# Patient Record
Sex: FEMALE | Race: WHITE | NOT HISPANIC OR LATINO | Employment: FULL TIME | URBAN - NONMETROPOLITAN AREA
[De-identification: names, ages, dates, MRNs, and addresses within clinical notes are randomized per-mention and may not be internally consistent; named-entity substitution may affect disease eponyms.]

---

## 2017-02-06 ENCOUNTER — OFFICE VISIT (OUTPATIENT)
Dept: OBSTETRICS AND GYNECOLOGY | Facility: CLINIC | Age: 37
End: 2017-02-06

## 2017-02-06 VITALS
HEIGHT: 62 IN | DIASTOLIC BLOOD PRESSURE: 92 MMHG | SYSTOLIC BLOOD PRESSURE: 114 MMHG | WEIGHT: 250 LBS | BODY MASS INDEX: 46.01 KG/M2

## 2017-02-06 DIAGNOSIS — Z01.411 ENCOUNTER FOR GYNECOLOGICAL EXAMINATION WITH ABNORMAL FINDING: ICD-10-CM

## 2017-02-06 DIAGNOSIS — N91.2 AMENORRHEA: Primary | ICD-10-CM

## 2017-02-06 DIAGNOSIS — N86 CERVICAL EROSION: ICD-10-CM

## 2017-02-06 LAB
B-HCG UR QL: NEGATIVE
INTERNAL NEGATIVE CONTROL: NORMAL
INTERNAL POSITIVE CONTROL: NORMAL
Lab: NORMAL

## 2017-02-06 PROCEDURE — 81025 URINE PREGNANCY TEST: CPT | Performed by: OBSTETRICS & GYNECOLOGY

## 2017-02-06 PROCEDURE — 99395 PREV VISIT EST AGE 18-39: CPT | Performed by: OBSTETRICS & GYNECOLOGY

## 2017-02-06 PROCEDURE — G0123 SCREEN CERV/VAG THIN LAYER: HCPCS | Performed by: OBSTETRICS & GYNECOLOGY

## 2017-02-06 PROCEDURE — 88305 TISSUE EXAM BY PATHOLOGIST: CPT | Performed by: OBSTETRICS & GYNECOLOGY

## 2017-02-06 RX ORDER — ERGOCALCIFEROL 1.25 MG/1
50000 CAPSULE ORAL
COMMUNITY
Start: 2017-01-09

## 2017-02-06 RX ORDER — TRAMADOL HYDROCHLORIDE 50 MG/1
50 TABLET ORAL EVERY 6 HOURS PRN
COMMUNITY
Start: 2017-01-14 | End: 2018-11-29 | Stop reason: HOSPADM

## 2017-02-06 RX ORDER — PROPRANOLOL HYDROCHLORIDE 20 MG/1
20 TABLET ORAL 3 TIMES DAILY PRN
COMMUNITY
Start: 2017-01-11

## 2017-02-06 RX ORDER — FAMOTIDINE 20 MG/1
20 TABLET, FILM COATED ORAL AS NEEDED
COMMUNITY
Start: 2017-01-09 | End: 2018-08-23

## 2017-02-06 RX ORDER — MELOXICAM 15 MG/1
15 TABLET ORAL DAILY
COMMUNITY
Start: 2017-01-09 | End: 2018-10-29

## 2017-02-06 RX ORDER — IMIPRAMINE HCL 50 MG
50 TABLET ORAL NIGHTLY
COMMUNITY
Start: 2017-01-09 | End: 2018-09-28

## 2017-02-06 RX ORDER — DULOXETIN HYDROCHLORIDE 60 MG/1
60 CAPSULE, DELAYED RELEASE ORAL 2 TIMES DAILY
COMMUNITY
Start: 2017-01-09

## 2017-02-06 NOTE — PROGRESS NOTES
Subjective   Cathi Krishnan is a 36 y.o. female who presents for a yearly gyn visit.  Pt has been amenorrheic for 6 months.     History of Present Illness  Pt is a 37 yo WF who is here for a yearly gyn visit who has been amenorrheic for 6 months.  Pt states she still has some cramping when it is period time .  The following portions of the patient's history were reviewed and updated as appropriate: allergies, current medications, past family history, past medical history, past social history, past surgical history and problem list.    Review of Systems   Genitourinary: Positive for menstrual problem.   All other systems reviewed and are negative.      Objective   Physical Exam   Constitutional: She is oriented to person, place, and time.   Morbid obesity   HENT:   Head: Normocephalic.   Eyes: Conjunctivae are normal. Pupils are equal, round, and reactive to light. Right eye exhibits no discharge. Left eye exhibits no discharge.   Neck: Normal range of motion. Neck supple. No tracheal deviation present. No thyromegaly present.   Cardiovascular: Normal rate, regular rhythm and normal heart sounds.    Pulmonary/Chest: Effort normal and breath sounds normal. She has no wheezes. She has no rales. Right breast exhibits no inverted nipple, no mass, no nipple discharge, no skin change and no tenderness. Left breast exhibits no inverted nipple, no mass, no nipple discharge, no skin change and no tenderness. Breasts are symmetrical. There is no breast swelling.   Abdominal: Soft. Bowel sounds are normal. She exhibits no distension and no mass. There is no tenderness. There is no rebound and no guarding. No hernia. Hernia confirmed negative in the right inguinal area and confirmed negative in the left inguinal area.   Genitourinary: Rectum normal, vagina normal and uterus normal. Rectal exam shows no external hemorrhoid, no internal hemorrhoid, no fissure, no mass, no tenderness and anal tone normal. No breast tenderness,  discharge or bleeding. Pelvic exam was performed with patient supine. No labial fusion. There is no rash, tenderness, lesion or injury on the right labia. There is no rash, tenderness, lesion or injury on the left labia. Cervix exhibits no motion tenderness, no discharge and no friability. Right adnexum displays no mass, no tenderness and no fullness. Left adnexum displays no mass, no tenderness and no fullness. No erythema, tenderness or bleeding in the vagina. No foreign body in the vagina. No signs of injury around the vagina. No vaginal discharge found.   Musculoskeletal: Normal range of motion.   Lymphadenopathy:        Right: No inguinal adenopathy present.        Left: No inguinal adenopathy present.   Neurological: She is alert and oriented to person, place, and time. She has normal reflexes.   Skin: Skin is warm and dry. No rash noted.   Psychiatric: She has a normal mood and affect. Her behavior is normal. Judgment and thought content normal.   Nursing note and vitals reviewed.        Assessment/Plan     WWE  Secondary amenorrhea  Morbid obesity  Cervical erosion    Check FSH, T estrogens, TSH, T4, Prolactin.  Cervical bx taken at 2 0'clock

## 2017-02-07 LAB
ESTROGEN SERPL-MCNC: 151 PG/ML
FSH SERPL-ACNC: 5.5 MIU/ML
PROLACTIN SERPL-MCNC: 11.3 NG/ML (ref 4.8–23.3)
T4 FREE SERPL-MCNC: 0.88 NG/DL (ref 0.78–2.19)
TSH SERPL DL<=0.005 MIU/L-ACNC: 1.79 MIU/ML (ref 0.47–4.68)

## 2017-02-08 LAB
CYTO UR: NORMAL
LAB AP CASE REPORT: NORMAL
LAB AP CLINICAL INFORMATION: NORMAL
Lab: NORMAL
PATH REPORT.FINAL DX SPEC: NORMAL
PATH REPORT.GROSS SPEC: NORMAL

## 2017-02-09 LAB
GEN CATEG CVX/VAG CYTO-IMP: NORMAL
LAB AP CASE REPORT: NORMAL
LAB AP GYN ADDITIONAL INFORMATION: NORMAL
LAB AP GYN OTHER FINDINGS: NORMAL
Lab: NORMAL
PATH INTERP SPEC-IMP: NORMAL
STAT OF ADQ CVX/VAG CYTO-IMP: NORMAL

## 2017-02-17 ENCOUNTER — OFFICE VISIT (OUTPATIENT)
Dept: OBSTETRICS AND GYNECOLOGY | Facility: CLINIC | Age: 37
End: 2017-02-17

## 2017-02-17 VITALS
HEIGHT: 62 IN | WEIGHT: 250 LBS | BODY MASS INDEX: 46.01 KG/M2 | DIASTOLIC BLOOD PRESSURE: 84 MMHG | SYSTOLIC BLOOD PRESSURE: 124 MMHG

## 2017-02-17 DIAGNOSIS — N91.1 AMENORRHEA, SECONDARY: Primary | ICD-10-CM

## 2017-02-17 PROCEDURE — 99212 OFFICE O/P EST SF 10 MIN: CPT | Performed by: OBSTETRICS & GYNECOLOGY

## 2017-02-17 RX ORDER — MEDROXYPROGESTERONE ACETATE 10 MG/1
10 TABLET ORAL DAILY
Qty: 5 TABLET | Refills: 0 | Status: SHIPPED | OUTPATIENT
Start: 2017-02-17 | End: 2017-03-27 | Stop reason: SDUPTHER

## 2017-02-17 NOTE — PROGRESS NOTES
Subjective   Cathi Krishnan is a 36 y.o. female who is experiencing secondary amenorrhea.     History of Present Illness  Pt is a 37 yo WF who has secondary amenorrhea and she had an FSH and this was definitely premenopausal at 5.5, TSH, T4 were nl, PT was neg, T. Estrogens mildly decreased at 161.  The following portions of the patient's history were reviewed and updated as appropriate: allergies, current medications, past family history, past medical history, past social history, past surgical history and problem list.    Review of Systems   Constitutional: Negative for fatigue and unexpected weight change.   HENT: Negative.    Eyes: Negative.    Respiratory: Negative for cough, chest tightness and shortness of breath.    Cardiovascular: Negative for chest pain, palpitations and leg swelling.   Gastrointestinal: Negative for abdominal distention, abdominal pain, anal bleeding, blood in stool, constipation, diarrhea, nausea and vomiting.   Endocrine: Negative for polydipsia and polyuria.   Genitourinary: Positive for menstrual problem. Negative for difficulty urinating, dyspareunia, dysuria, frequency, genital sores, hematuria, pelvic pain, urgency, vaginal bleeding, vaginal discharge and vaginal pain.   Musculoskeletal: Negative.    Skin: Negative for color change and rash.   Allergic/Immunologic: Negative.    Neurological: Negative.  Negative for dizziness, seizures, syncope, light-headedness and headaches.   Hematological: Negative for adenopathy. Does not bruise/bleed easily.   Psychiatric/Behavioral: Negative for agitation, confusion, dysphoric mood, sleep disturbance and suicidal ideas. The patient is not nervous/anxious.        Objective   Physical Exam   Constitutional: She is oriented to person, place, and time. She appears well-developed and well-nourished.   HENT:   Head: Normocephalic.   Eyes: Conjunctivae are normal. Pupils are equal, round, and reactive to light. Right eye exhibits no discharge.  Left eye exhibits no discharge.   Neck: Normal range of motion. Neck supple. No tracheal deviation present. No thyromegaly present.   Cardiovascular: Normal rate, regular rhythm and normal heart sounds.    Pulmonary/Chest: Effort normal and breath sounds normal. She has no wheezes. She has no rales. Right breast exhibits no inverted nipple, no mass, no nipple discharge, no skin change and no tenderness. Left breast exhibits no inverted nipple, no mass, no nipple discharge, no skin change and no tenderness. Breasts are symmetrical. There is no breast swelling.   Abdominal: Soft. Bowel sounds are normal. She exhibits no distension and no mass. There is no tenderness. There is no rebound and no guarding. No hernia. Hernia confirmed negative in the right inguinal area and confirmed negative in the left inguinal area.   Genitourinary: Rectum normal. Rectal exam shows no external hemorrhoid, no internal hemorrhoid, no fissure, no mass, no tenderness and anal tone normal. No breast tenderness, discharge or bleeding. Pelvic exam was performed with patient supine. No labial fusion. There is no rash, tenderness, lesion or injury on the right labia. There is no rash, tenderness, lesion or injury on the left labia. Cervix exhibits no motion tenderness, no discharge and no friability. Right adnexum displays no mass, no tenderness and no fullness. Left adnexum displays no mass, no tenderness and no fullness. No erythema, tenderness or bleeding in the vagina. No foreign body in the vagina. No signs of injury around the vagina. No vaginal discharge found.   Genitourinary Comments: BUS glands appear nl, perineum intact, urethral orifice appears nl, vagina has good support.   Musculoskeletal: Normal range of motion.   Lymphadenopathy:        Right: No inguinal adenopathy present.        Left: No inguinal adenopathy present.   Neurological: She is alert and oriented to person, place, and time. She has normal reflexes.   Skin: Skin is  warm and dry. No rash noted.   Psychiatric: She has a normal mood and affect. Her behavior is normal. Judgment and thought content normal.   Nursing note and vitals reviewed.      Assessment/Plan   Cathi was seen today for amenorrhea.    Diagnoses and all orders for this visit:    Amenorrhea, secondary     With all of the lab tests being nl, it appears that pt is oligoovulatory  Rx Provera 10 mg daily for 5 days  Keep a bleeding calendar and RV 1 month

## 2017-03-27 ENCOUNTER — OFFICE VISIT (OUTPATIENT)
Dept: OBSTETRICS AND GYNECOLOGY | Facility: CLINIC | Age: 37
End: 2017-03-27

## 2017-03-27 VITALS
DIASTOLIC BLOOD PRESSURE: 82 MMHG | SYSTOLIC BLOOD PRESSURE: 130 MMHG | WEIGHT: 251 LBS | HEIGHT: 62 IN | BODY MASS INDEX: 46.19 KG/M2

## 2017-03-27 DIAGNOSIS — N91.1 AMENORRHEA, SECONDARY: Primary | ICD-10-CM

## 2017-03-27 PROCEDURE — 99212 OFFICE O/P EST SF 10 MIN: CPT | Performed by: OBSTETRICS & GYNECOLOGY

## 2017-03-27 RX ORDER — MEDROXYPROGESTERONE ACETATE 10 MG/1
10 TABLET ORAL DAILY
Qty: 30 TABLET | Refills: 3 | Status: SHIPPED | OUTPATIENT
Start: 2017-03-27 | End: 2017-06-19 | Stop reason: SDUPTHER

## 2017-06-19 ENCOUNTER — OFFICE VISIT (OUTPATIENT)
Dept: OBSTETRICS AND GYNECOLOGY | Facility: CLINIC | Age: 37
End: 2017-06-19

## 2017-06-19 VITALS
HEIGHT: 62 IN | DIASTOLIC BLOOD PRESSURE: 84 MMHG | SYSTOLIC BLOOD PRESSURE: 118 MMHG | BODY MASS INDEX: 46.56 KG/M2 | WEIGHT: 253 LBS

## 2017-06-19 DIAGNOSIS — N93.8 DUB (DYSFUNCTIONAL UTERINE BLEEDING): Primary | ICD-10-CM

## 2017-06-19 PROCEDURE — 99212 OFFICE O/P EST SF 10 MIN: CPT | Performed by: OBSTETRICS & GYNECOLOGY

## 2017-06-19 RX ORDER — MEDROXYPROGESTERONE ACETATE 10 MG/1
10 TABLET ORAL DAILY
Qty: 30 TABLET | Refills: 3 | Status: SHIPPED | OUTPATIENT
Start: 2017-06-19 | End: 2018-06-21

## 2017-06-19 NOTE — PROGRESS NOTES
Subjective   Cathi Krishnan is a 36 y.o. female who is here for follow up on her DUB.    History of Present Illness  Patient is a 37 yo WF with DUB from chronic anovulation.  I started her on Provera 10 mg the first 10 days of each month and she is having 3-4 days of light withdrawal bleeding q month.  The following portions of the patient's history were reviewed and updated as appropriate: allergies, current medications, past family history, past medical history, past social history, past surgical history and problem list.    Review of Systems   Constitutional: Negative for fatigue and unexpected weight change.   HENT: Negative.    Eyes: Negative.    Respiratory: Negative for cough, chest tightness and shortness of breath.    Cardiovascular: Negative for chest pain, palpitations and leg swelling.   Gastrointestinal: Negative for abdominal distention, abdominal pain, anal bleeding, blood in stool, constipation, diarrhea, nausea and vomiting.   Endocrine: Negative for polydipsia and polyuria.   Genitourinary: Positive for menstrual problem. Negative for difficulty urinating, dyspareunia, dysuria, frequency, genital sores, hematuria, pelvic pain, urgency, vaginal bleeding, vaginal discharge and vaginal pain.   Musculoskeletal: Negative.    Skin: Negative for color change and rash.   Allergic/Immunologic: Negative.    Neurological: Negative.  Negative for dizziness, seizures, syncope, light-headedness and headaches.   Hematological: Negative for adenopathy. Does not bruise/bleed easily.   Psychiatric/Behavioral: Negative for agitation, confusion, dysphoric mood, sleep disturbance and suicidal ideas. The patient is not nervous/anxious.        Objective   Physical Exam   Constitutional: She is oriented to person, place, and time. She appears well-developed and well-nourished.   HENT:   Head: Normocephalic.   Eyes: Pupils are equal, round, and reactive to light.   Neck: Normal range of motion.   Cardiovascular: Normal  rate.    Pulmonary/Chest: Effort normal.   Abdominal: Soft.   Musculoskeletal: Normal range of motion.   Neurological: She is alert and oriented to person, place, and time.   Skin: Skin is warm and dry.   Psychiatric: She has a normal mood and affect. Her behavior is normal. Judgment and thought content normal.   Nursing note and vitals reviewed.      Assessment/Plan   Cathi was seen today for follow-up.    Diagnoses and all orders for this visit:    DUB (dysfunctional uterine bleeding)    Patient is having withdrawal bleeding every month with Provera 10 mg daily for the first 10 days of the month.    Continue this regimen.  RV 1 year or prn if problems occur.

## 2018-04-23 ENCOUNTER — TELEPHONE (OUTPATIENT)
Dept: BARIATRICS/WEIGHT MGMT | Facility: CLINIC | Age: 38
End: 2018-04-23

## 2018-04-23 ENCOUNTER — OFFICE VISIT (OUTPATIENT)
Dept: BARIATRICS/WEIGHT MGMT | Facility: HOSPITAL | Age: 38
End: 2018-04-23

## 2018-04-23 ENCOUNTER — OFFICE VISIT (OUTPATIENT)
Dept: BARIATRICS/WEIGHT MGMT | Facility: CLINIC | Age: 38
End: 2018-04-23

## 2018-04-23 VITALS
DIASTOLIC BLOOD PRESSURE: 93 MMHG | BODY MASS INDEX: 47.92 KG/M2 | HEART RATE: 100 BPM | HEIGHT: 62 IN | WEIGHT: 260.4 LBS | TEMPERATURE: 98 F | SYSTOLIC BLOOD PRESSURE: 149 MMHG | OXYGEN SATURATION: 99 %

## 2018-04-23 DIAGNOSIS — R03.0 ELEVATED BLOOD PRESSURE READING WITHOUT DIAGNOSIS OF HYPERTENSION: ICD-10-CM

## 2018-04-23 DIAGNOSIS — R29.818 SUSPECTED SLEEP APNEA: ICD-10-CM

## 2018-04-23 DIAGNOSIS — G47.19 EXCESSIVE DAYTIME SLEEPINESS: ICD-10-CM

## 2018-04-23 DIAGNOSIS — R53.83 FATIGUE, UNSPECIFIED TYPE: ICD-10-CM

## 2018-04-23 DIAGNOSIS — E66.01 OBESITY, CLASS III, BMI 40-49.9 (MORBID OBESITY) (HCC): Primary | ICD-10-CM

## 2018-04-23 DIAGNOSIS — R63.5 WEIGHT GAIN: ICD-10-CM

## 2018-04-23 PROCEDURE — 99204 OFFICE O/P NEW MOD 45 MIN: CPT | Performed by: NURSE PRACTITIONER

## 2018-04-23 PROCEDURE — 97802 MEDICAL NUTRITION INDIV IN: CPT

## 2018-04-23 RX ORDER — DOCUSATE SODIUM 100 MG/1
90 CAPSULE, LIQUID FILLED ORAL DAILY
COMMUNITY

## 2018-04-23 RX ORDER — PSEUDOEPHEDRINE HCL 30 MG
30 TABLET ORAL DAILY PRN
COMMUNITY

## 2018-04-23 RX ORDER — NAPROXEN 500 MG/1
500 TABLET ORAL 2 TIMES DAILY WITH MEALS
COMMUNITY
End: 2018-10-29

## 2018-04-23 RX ORDER — FERROUS SULFATE 325(65) MG
325 TABLET ORAL
COMMUNITY

## 2018-04-23 NOTE — PROGRESS NOTES
"Nutrition Bariatric/[facility] Note     Visit   Initial Assessment     Anthropometrics   Height: 62\"  Weight: 260.5#  BMI: 47.6    Waist: 53\"  Hip: 57\"  Chest: 52\"  Thigh: 27\"  Arm: 17\"  Body Fat: 48.6%    Nutrition Recall  24 Hour recall:  (10am) sweetened cereal or cookies, sweet tea (L) skips meal (3-4pm) meat, vegetable, potatoes, sweet tea  Eating 2 meals daily   Protein lacking at breakfast  Snacking: trail mix, peanut butter crackers, cookies  Excessive sweet intake. Sips on sweet tea all day.  Large portions  Drinking with meals   Drinking carbonated beverages only when eating out.  Drinking less than 64 fluid ounces    Exercise   None    Habits:  None    Education    Goal Setting and Information Packet    Nutrition Goals   Eat 3-4 meals per day with protein  Eat protein first at meals  Eliminate snacks  Healthier food choices  Portion control / Use smaller plate or measuring cup   Eliminate soda  Replace sugar beverages with artifical sweetened   Increase fluid intake to 64 ounces per day    Exercise Goals  Add 15-30 minutes of walking, cycling, elliptical, swimming, chair, yoga or crossfit daily    Lisa Mc RD, LD  04/23/2018  1:04 PM    "

## 2018-04-23 NOTE — TELEPHONE ENCOUNTER
JESEM to notify patient that her in house sleep study is scheduled for 05/01/2018 at 9:00pm at [facility]

## 2018-04-23 NOTE — PATIENT INSTRUCTIONS
Cathi Krishnan is a  37 y.o. who has morbid obesity with BMI of 47.6 and desires surgical weight loss. Patient has the following comorbidities: suspected sleep apnea. A sleep study has been ordered today (externally per patient request). Office will arrange.  Patient has been advised that this surgery is considered to be elective major surgery that is typically done laparoscopically. Patient is a potentially good surgical candidate. Patient will need to meet with the Bariatric Surgeon to further discuss surgical options. Patient has been advised that they will need to have a work-up prior to surgery. This work-up will include an EGD to assess for H. Pylori and Espinoza's esophagus. Additionally, patient will need to have a psychological evaluation and clearance prior to surgery. Patient will need the following additional clearances/testing: EKG. Baseline lab work will be obtained today. Patient will see the dietician today for make specific goals for diet, exercise, and lifestyle. Patient has received intensive behavioral therapy for obesity today. I will have them follow up in one month for a weight recheck and to further discuss options.

## 2018-04-23 NOTE — PROGRESS NOTES
"Subjective   Cathi Krishnan is a 37 y.o. female.     History of Present Illness   Cathi Krishnan is a 37 y.o. year-old who has morbid obesity and is interested in having surgical weight loss. Patient has been overweight for the past 14 years. The most weight ever lost was 10 pounds from watching what she ate. Unsupervised diet attempts include: calorie counting and fasting. Supervised diet attempts include: medically supervised weight loss. Patient has tried the following OTC or prescription medications for weight loss: [employer] and garcinia cambodia. Patient states she tend to eat due to physical hunger and food makes her feel happier.  Patient is limited in activities due to: chronic knee and back pain.   Vitals:    04/23/18 1251 04/23/18 1322   BP: (!) 149/3 149/93   BP Location: Right arm    Patient Position: Sitting    Cuff Size: Adult    Pulse: 100    Temp: 98 °F (36.7 °C)    SpO2: 99%    Weight: 118 kg (260 lb 6.4 oz)    Height: 157.5 cm (62\")      She  has a past medical history of Ankle swelling; Anxiety; Arthritis; Back pain; Constipation; Depression; Excessive thirst; GERD (gastroesophageal reflux disease); Heartburn; History of attempted suicide; Leg cramps; Loss of bladder control (leaking urine); Pain; Rash; Rheumatoid arthritis; Varicose veins of both lower extremities; and Wears glasses.  She  does not have a problem list on file.  She  has a past surgical history that includes Essure tubal ligation; Tonsillectomy; Hernia repair (Left); Teeth Extraction; Esophagogastroduodenoscopy; and Cholecystectomy (1999).  Her family history includes Breast cancer in her mother; Coronary artery disease in her mother; Deep vein thrombosis in her mother; Diabetes in her brother and mother; Heart attack in her brother and maternal grandmother; Heart disease in her brother and mother; Hypertension in her brother, father, and mother; Lung cancer in her paternal grandfather; Obesity in her brother, brother, " maternal grandmother, and mother; Ovarian cancer in her mother; Sleep apnea in her mother; Stroke in her mother.  She  reports that she quit smoking about 8 years ago. Her smoking use included Cigarettes. She has a 10.00 pack-year smoking history. She has never used smokeless tobacco. She reports that she does not drink alcohol or use drugs.  Current Outpatient Prescriptions   Medication Sig Dispense Refill   • diclofenac (VOLTAREN) 1 % gel gel      • DOCUSATE SODIUM PO Take 100 mg by mouth Daily.     • DULoxetine (CYMBALTA) 60 MG capsule      • famotidine (PEPCID) 20 MG tablet      • ferrous sulfate 325 (65 FE) MG tablet Take 325 mg by mouth Daily With Breakfast.     • imipramine (TOFRANIL) 50 MG tablet      • meloxicam (MOBIC) 15 MG tablet      • naproxen (NAPROSYN) 500 MG tablet Take 500 mg by mouth 2 (Two) Times a Day With Meals.     • propranolol (INDERAL) 20 MG tablet      • pseudoephedrine (SUDAFED) 30 MG tablet Take 30 mg by mouth As Needed for Congestion. Sudogest     • traMADol (ULTRAM) 50 MG tablet      • vitamin D (ERGOCALCIFEROL) 42768 UNITS capsule capsule      • medroxyPROGESTERone (PROVERA) 10 MG tablet Take 1 tablet by mouth Daily. 1 tablet daily for the first 10 days of each month 30 tablet 3     No current facility-administered medications for this visit.      She is allergic to asa [aspirin]; cortisone; penicillins; prednisone; zantac [ranitidine hcl]; and ibuprofen.      The following portions of the patient's history were reviewed and updated as appropriate: allergies, current medications, past family history, past medical history, past social history, past surgical history and problem list.    Review of Systems   Constitutional: Positive for fatigue and unexpected weight change. Negative for activity change and appetite change.   HENT: Positive for dental problem.         Dentures   Eyes: Negative.         Wears glasses   Respiratory: Negative.  Negative for apnea, cough, chest tightness and  shortness of breath.    Cardiovascular: Positive for leg swelling. Negative for chest pain and palpitations.   Gastrointestinal: Positive for constipation. Negative for abdominal pain, anal bleeding, nausea and vomiting.        GERD, controlled with pepcid   Endocrine: Positive for polydipsia.   Genitourinary: Negative.         Stress incontinence; up to date on pap smear   Musculoskeletal: Positive for arthralgias, back pain and neck pain.        RA, mild per patient    Skin: Positive for rash.        Under skin folds   Allergic/Immunologic: Negative.    Neurological: Negative.  Negative for seizures and syncope.   Hematological: Negative.  Does not bruise/bleed easily.   Psychiatric/Behavioral: Positive for dysphoric mood. Negative for self-injury, sleep disturbance and suicidal ideas. The patient is nervous/anxious.        Objective   Physical Exam   Constitutional: She is oriented to person, place, and time. Vital signs are normal. She appears well-developed and well-nourished. She is cooperative. No distress.   HENT:   Head: Normocephalic and atraumatic.   Nose: Nose normal.   Mouth/Throat: Oropharynx is clear and moist. No oropharyngeal exudate or tonsillar abscesses.   Eyes: Conjunctivae, EOM and lids are normal. Pupils are equal, round, and reactive to light. Right eye exhibits no discharge. Left eye exhibits no discharge.   Glasses noted   Neck: Trachea normal. Neck supple. No JVD present. Carotid bruit is not present. No no neck rigidity. No tracheal deviation present. No thyromegaly present.   Cardiovascular: Normal rate, regular rhythm, S1 normal, S2 normal and normal heart sounds.    Pulmonary/Chest: Effort normal and breath sounds normal. No stridor. No respiratory distress. She has no wheezes. She has no rales.   Abdominal: Soft. Bowel sounds are normal. She exhibits no distension. There is no tenderness.   obese   Musculoskeletal: She exhibits no edema.        Right shoulder: She exhibits normal  strength.   Lymphadenopathy:     She has no cervical adenopathy.   Neurological: She is alert and oriented to person, place, and time. She has normal strength. No cranial nerve deficit.   Skin: Skin is warm, dry and intact. No rash noted.   Psychiatric: She has a normal mood and affect. Her speech is normal and behavior is normal.   Alert and oriented x 3   Vitals reviewed.      Assessment/Plan   Cathi was seen today for consult, obesity, nutrition counseling and weight loss.    Diagnoses and all orders for this visit:    Obesity, Class III, BMI 40-49.9 (morbid obesity)  -     Bariatric Nutritional Counseling; Standing  -     TSH; Future  -     Polysomnography 4 or More Parameters    Suspected sleep apnea  -     Polysomnography 4 or More Parameters    Excessive daytime sleepiness  -     Polysomnography 4 or More Parameters    Weight gain  -     TSH; Future    Fatigue, unspecified type  -     TSH; Future  -     Polysomnography 4 or More Parameters    Elevated blood pressure reading without diagnosis of hypertension          Cathi Krishnan is a  37 y.o. who has morbid obesity with BMI of 47.6 and desires surgical weight loss. Patient has the following comorbidities: suspected sleep apnea. A sleep study has been ordered today (externally per patient request). Office will arrange.  Patient has been advised that this surgery is considered to be elective major surgery that is typically done laparoscopically. Patient is a potentially good surgical candidate. Patient will need to meet with the Bariatric Surgeon to further discuss surgical options. Patient has been advised that they will need to have a work-up prior to surgery. This work-up will include an EGD to assess for H. Pylori and Espinoza's esophagus. Additionally, patient will need to have a psychological evaluation and clearance prior to surgery. Patient will need the following additional clearances/testing: EKG. Baseline lab work will be obtained today.  Patient will see the dietician today for make specific goals for diet, exercise, and lifestyle. Patient has received intensive behavioral therapy for obesity today. I will have them follow up in one month for a weight recheck and to further discuss options.

## 2018-05-21 ENCOUNTER — OFFICE VISIT (OUTPATIENT)
Dept: BARIATRICS/WEIGHT MGMT | Facility: CLINIC | Age: 38
End: 2018-05-21

## 2018-05-21 ENCOUNTER — TELEPHONE (OUTPATIENT)
Dept: BARIATRICS/WEIGHT MGMT | Facility: CLINIC | Age: 38
End: 2018-05-21

## 2018-05-21 VITALS
HEIGHT: 62 IN | TEMPERATURE: 97.8 F | BODY MASS INDEX: 46.63 KG/M2 | DIASTOLIC BLOOD PRESSURE: 82 MMHG | OXYGEN SATURATION: 100 % | SYSTOLIC BLOOD PRESSURE: 143 MMHG | WEIGHT: 253.4 LBS | HEART RATE: 89 BPM

## 2018-05-21 DIAGNOSIS — E66.01 OBESITY, CLASS III, BMI 40-49.9 (MORBID OBESITY) (HCC): Primary | ICD-10-CM

## 2018-05-21 DIAGNOSIS — Z71.89 PRE-BARIATRIC SURGERY PSYCHOLOGICAL EVALUATION: ICD-10-CM

## 2018-05-21 DIAGNOSIS — R29.818 SUSPECTED SLEEP APNEA: ICD-10-CM

## 2018-05-21 PROCEDURE — 99213 OFFICE O/P EST LOW 20 MIN: CPT | Performed by: NURSE PRACTITIONER

## 2018-05-21 NOTE — TELEPHONE ENCOUNTER
Mild sleep apnea on sleep study. No CPAP or bi-pap machine was felt necessary at this time.   Weight loss should help.   Attempted to call patient. LVM for her to call back to get results.

## 2018-05-21 NOTE — PROGRESS NOTES
"Subjective   Cathi Krishnan is a 37 y.o. female.     History of Present Illness   Cathi Krishnan is here with morbid obesity and desires to have surgery for weight loss. This is the patient's 2nd visit to the office.  Patient will be made a referral today for psych evaluation and clearance. She had her sleep study done at [facility] and those results are not received yet. She had her TSH level drawn at her PCP's office, but those results have not been received yet either. She will need EKG and that will be ordered at visit number four. Patient has been exercising by walking one mile per day for one month. Patient has been making health dietary choices and eating 4 small meals per day with protein at each. Patient has been eating 30 grams of protein per day. Patient is drinking at least 64 plus ounces of water per day.   Vitals:    05/21/18 0954   BP: 143/82   BP Location: Right arm   Patient Position: Sitting   Cuff Size: Adult   Pulse: 89   Temp: 97.8 °F (36.6 °C)   SpO2: 100%   Weight: 115 kg (253 lb 6.4 oz)   Height: 157.5 cm (62\")         The following portions of the patient's history were reviewed and updated as appropriate: allergies, current medications, past family history, past medical history, past social history, past surgical history and problem list.    Review of Systems   Constitutional: Positive for fatigue and unexpected weight change. Negative for activity change and appetite change.   HENT: Negative.    Eyes: Negative.    Respiratory: Negative.  Negative for apnea, cough, chest tightness and shortness of breath.         Snoring; sleep study results pending   Cardiovascular: Positive for leg swelling. Negative for chest pain and palpitations.   Gastrointestinal: Positive for constipation. Negative for abdominal pain, anal bleeding, nausea and vomiting.        Heartburn   Endocrine: Negative.    Genitourinary: Positive for frequency.   Musculoskeletal: Positive for back pain " and neck pain. Negative for arthralgias.   Skin: Negative.    Allergic/Immunologic: Negative.    Neurological: Negative.  Negative for seizures and syncope.   Hematological: Negative.  Does not bruise/bleed easily.   Psychiatric/Behavioral: Positive for dysphoric mood and sleep disturbance. Negative for self-injury and suicidal ideas. The patient is nervous/anxious.        Objective   Physical Exam   Constitutional: She is oriented to person, place, and time. Vital signs are normal. She appears well-developed and well-nourished. She is cooperative. No distress.   HENT:   Head: Normocephalic and atraumatic.   Nose: Nose normal.   Mouth/Throat: Oropharynx is clear and moist. No oropharyngeal exudate or tonsillar abscesses.   Eyes: Conjunctivae, EOM and lids are normal. Pupils are equal, round, and reactive to light. Right eye exhibits no discharge. Left eye exhibits no discharge.   Glasses noted   Neck: Trachea normal. Neck supple. No JVD present. Carotid bruit is not present. No neck rigidity. No tracheal deviation present. No thyromegaly present.   Cardiovascular: Normal rate, regular rhythm, S1 normal, S2 normal and normal heart sounds.    Pulmonary/Chest: Effort normal and breath sounds normal. No stridor. No respiratory distress. She has no wheezes. She has no rales.   Abdominal: Soft. Bowel sounds are normal. She exhibits no distension. There is no tenderness.   obese   Musculoskeletal: She exhibits no edema.        Right shoulder: She exhibits normal strength.   Lymphadenopathy:     She has no cervical adenopathy.   Neurological: She is alert and oriented to person, place, and time. She has normal strength. No cranial nerve deficit.   Skin: Skin is warm, dry and intact. No rash noted.   Psychiatric: She has a normal mood and affect. Her speech is normal and behavior is normal.   Alert and oriented x 3   Vitals reviewed.      Assessment/Plan   Cathi was seen today for follow-up, obesity, nutrition counseling  and weight loss.    Diagnoses and all orders for this visit:    Obesity, Class III, BMI 40-49.9 (morbid obesity)  -     Ambulatory Referral to Psychology    Pre-bariatric surgery psychological evaluation  Comments:  referral made today. Office will arrange.   Orders:  -     Ambulatory Referral to Psychology    Suspected sleep apnea  Comments:  sleep study results pending. Will call [facility] for results.           Cathi Krishnan has done well this month with healthy changes. Patient has lost 7 pounds. Today we discussed healthy changes in lifestyle, diet, and exercise.   Handout provided on portion/servings/reading nutrition labels.  Premier shake sample provided.  Intensive behavioral therapy for obesity was done today.   Goals for this month are: 3 meals per day with protein at each; eat protein first, use smaller plate; exercise as advised.  Psych referral has been made. Office will arrange.  Will need EKG at visit #4.   Follow up in one month with Dr. Rodriguez to discuss EGD and surgery options.

## 2018-05-21 NOTE — PATIENT INSTRUCTIONS
Cathi Krishnan has done well this month with healthy changes. Patient has lost 7 pounds. Today we discussed healthy changes in lifestyle, diet, and exercise.   Handout provided on portion/servings/reading nutrition labels.  Premier shake sample provided.  Intensive behavioral therapy for obesity was done today.   Goals for this month are: 3 meals per day with protein at each; eat protein first, use smaller plate; exercise as advised.  Psych referral has been made. Office will arrange.  Will need EKG at visit #4.   Follow up in one month with Dr. Rodriguez to discuss EGD and surgery options.

## 2018-06-21 ENCOUNTER — OFFICE VISIT (OUTPATIENT)
Dept: BARIATRICS/WEIGHT MGMT | Facility: CLINIC | Age: 38
End: 2018-06-21

## 2018-06-21 ENCOUNTER — HOSPITAL ENCOUNTER (OUTPATIENT)
Dept: CARDIOLOGY | Facility: HOSPITAL | Age: 38
Discharge: HOME | End: 2018-06-21
Attending: SURGERY | Admitting: SURGERY

## 2018-06-21 ENCOUNTER — OFFICE VISIT (OUTPATIENT)
Dept: PSYCHIATRY | Age: 38
End: 2018-06-21
Payer: COMMERCIAL

## 2018-06-21 ENCOUNTER — OFFICE VISIT (OUTPATIENT)
Dept: OBSTETRICS AND GYNECOLOGY | Facility: CLINIC | Age: 38
End: 2018-06-21

## 2018-06-21 VITALS
SYSTOLIC BLOOD PRESSURE: 149 MMHG | HEART RATE: 104 BPM | DIASTOLIC BLOOD PRESSURE: 87 MMHG | WEIGHT: 254 LBS | BODY MASS INDEX: 46.74 KG/M2 | HEIGHT: 62 IN | OXYGEN SATURATION: 97 %

## 2018-06-21 VITALS
WEIGHT: 256.4 LBS | SYSTOLIC BLOOD PRESSURE: 125 MMHG | HEART RATE: 89 BPM | BODY MASS INDEX: 47.18 KG/M2 | DIASTOLIC BLOOD PRESSURE: 87 MMHG | HEIGHT: 62 IN | OXYGEN SATURATION: 99 % | TEMPERATURE: 97.6 F

## 2018-06-21 VITALS
DIASTOLIC BLOOD PRESSURE: 90 MMHG | BODY MASS INDEX: 46.56 KG/M2 | WEIGHT: 253 LBS | SYSTOLIC BLOOD PRESSURE: 122 MMHG | HEIGHT: 62 IN

## 2018-06-21 DIAGNOSIS — IMO0001 CLASS 3 OBESITY DUE TO EXCESS CALORIES WITH SERIOUS COMORBIDITY AND BODY MASS INDEX (BMI) OF 45.0 TO 49.9 IN ADULT: ICD-10-CM

## 2018-06-21 DIAGNOSIS — N91.1 AMENORRHEA, SECONDARY: ICD-10-CM

## 2018-06-21 DIAGNOSIS — K21.9 GASTROESOPHAGEAL REFLUX DISEASE, ESOPHAGITIS PRESENCE NOT SPECIFIED: Primary | ICD-10-CM

## 2018-06-21 DIAGNOSIS — K21.9 GASTROESOPHAGEAL REFLUX DISEASE, ESOPHAGITIS PRESENCE NOT SPECIFIED: ICD-10-CM

## 2018-06-21 DIAGNOSIS — E66.01 OBESITY, CLASS III, BMI 40-49.9 (MORBID OBESITY) (HCC): ICD-10-CM

## 2018-06-21 DIAGNOSIS — Z87.891 FORMER SMOKER: ICD-10-CM

## 2018-06-21 DIAGNOSIS — Z00.00 ANNUAL PHYSICAL EXAM: Primary | ICD-10-CM

## 2018-06-21 DIAGNOSIS — F43.22 ADJUSTMENT DISORDER WITH ANXIOUS MOOD: Primary | ICD-10-CM

## 2018-06-21 DIAGNOSIS — Z80.3 FAMILY HISTORY OF BREAST CANCER IN MOTHER: ICD-10-CM

## 2018-06-21 PROCEDURE — 99214 OFFICE O/P EST MOD 30 MIN: CPT | Performed by: SURGERY

## 2018-06-21 PROCEDURE — 93010 ELECTROCARDIOGRAM REPORT: CPT | Performed by: INTERNAL MEDICINE

## 2018-06-21 PROCEDURE — 90791 PSYCH DIAGNOSTIC EVALUATION: CPT | Performed by: COUNSELOR

## 2018-06-21 PROCEDURE — 99395 PREV VISIT EST AGE 18-39: CPT | Performed by: OBSTETRICS & GYNECOLOGY

## 2018-06-21 PROCEDURE — 93005 ELECTROCARDIOGRAM TRACING: CPT | Performed by: SURGERY

## 2018-06-21 PROCEDURE — 87624 HPV HI-RISK TYP POOLED RSLT: CPT | Performed by: OBSTETRICS & GYNECOLOGY

## 2018-06-21 PROCEDURE — G0123 SCREEN CERV/VAG THIN LAYER: HCPCS | Performed by: OBSTETRICS & GYNECOLOGY

## 2018-06-21 RX ORDER — NAPROXEN 500 MG/1
500 TABLET ORAL 2 TIMES DAILY
COMMUNITY
Start: 2018-06-01 | End: 2019-01-15

## 2018-06-21 RX ORDER — FERROUS SULFATE 325(65) MG
325 TABLET ORAL DAILY
COMMUNITY

## 2018-06-21 RX ORDER — DULOXETIN HYDROCHLORIDE 60 MG/1
60 CAPSULE, DELAYED RELEASE ORAL DAILY
COMMUNITY
Start: 2017-01-09 | End: 2018-08-16 | Stop reason: SDUPTHER

## 2018-06-21 RX ORDER — ERGOCALCIFEROL 1.25 MG/1
5000 CAPSULE ORAL WEEKLY
COMMUNITY
Start: 2018-06-19

## 2018-06-21 RX ORDER — TRAMADOL HYDROCHLORIDE 50 MG/1
50 TABLET ORAL 4 TIMES DAILY
COMMUNITY
Start: 2017-01-14 | End: 2020-02-04

## 2018-06-21 RX ORDER — PROPRANOLOL HYDROCHLORIDE 20 MG/1
20 TABLET ORAL 3 TIMES DAILY
COMMUNITY
Start: 2017-01-11 | End: 2018-08-16 | Stop reason: SDUPTHER

## 2018-06-21 NOTE — PROGRESS NOTES
"  Patient Care Team:  Shlomo Lira MD as PCP - General (Family Medicine)    Reason for Visit:  Surgical Weight loss    Subjective     Patient is a 37 y.o. female presents with morbid obesity and her Body mass index is 46.9 kg/m².     She is here for discussion about the esophagogastroduodenoscopy procedure.  She stated she has been with the disease of obesity for year(s).  She stated she suffers from chronic back pain, GERD, joint pain and depression due to her weight gain.  She stated that weight loss helps alleviate these symptoms.   She stated that she has tried multiple diet regimens including participating in a medically supervised weight loss program to help with weight loss.  She stated that she has attempted these conservative methods for weight loss without maintaining long term success.        Review of Systems  General ROS: positive for  - sleep disturbance  Respiratory ROS: no cough, shortness of breath, or wheezing  Cardiovascular ROS: no chest pain or dyspnea on exertion  Gastrointestinal ROS: no abdominal pain, change in bowel habits, or black or bloody stools  Positive for constipation  Musculoskeletal ROS: positive for - joint pain    History  Past Medical History:   Diagnosis Date   • Ankle swelling    • Anxiety    • Arthritis    • Back pain    • Constipation    • Depression    • Excessive thirst    • GERD (gastroesophageal reflux disease)    • Heartburn    • History of attempted suicide     age 13 years old    • Leg cramps     with walking   • Loss of bladder control leaking urine   • Pain     Shoulder, neck, knee,back     • Rash     in skin folds    • Rheumatoid arthritis     \"starting\" was mild case per Dr. Pelaez   • Varicose veins of both lower extremities    • Wears glasses      Past Surgical History:   Procedure Laterality Date   • CHOLECYSTECTOMY  1999    lap   • ENDOSCOPY     • ESSURE TUBAL LIGATION      2016 & 2017    • HERNIA REPAIR Left     inguinal; without mesh    • TEETH " EXTRACTION     • TONSILLECTOMY       Family History   Problem Relation Age of Onset   • Hypertension Father    • Breast cancer Mother    • Ovarian cancer Mother    • Hypertension Mother    • Diabetes Mother    • Stroke Mother    • Coronary artery disease Mother    • Deep vein thrombosis Mother    • Obesity Mother    • Heart disease Mother    • Sleep apnea Mother    • Lung cancer Paternal Grandfather    • Hypertension Brother    • Obesity Brother    • Diabetes Brother    • Heart attack Brother    • Heart disease Brother    • Heart attack Maternal Grandmother    • Obesity Maternal Grandmother    • Obesity Brother      Social History   Substance Use Topics   • Smoking status: Former Smoker     Packs/day: 1.00     Years: 10.00     Types: Cigarettes     Quit date: 2010   • Smokeless tobacco: Never Used   • Alcohol use No       (Not in a hospital admission)  Allergies:  Asa [aspirin]; Cortisone; Penicillins; Prednisone; Zantac [ranitidine hcl]; and Ibuprofen      Current Outpatient Prescriptions:   •  DOCUSATE SODIUM PO, Take 100 mg by mouth Daily., Disp: , Rfl:   •  DULoxetine (CYMBALTA) 60 MG capsule, , Disp: , Rfl:   •  famotidine (PEPCID) 20 MG tablet, , Disp: , Rfl:   •  ferrous sulfate 325 (65 FE) MG tablet, Take 325 mg by mouth Daily With Breakfast., Disp: , Rfl:   •  imipramine (TOFRANIL) 50 MG tablet, , Disp: , Rfl:   •  meloxicam (MOBIC) 15 MG tablet, , Disp: , Rfl:   •  naproxen (NAPROSYN) 500 MG tablet, Take 500 mg by mouth 2 (Two) Times a Day With Meals., Disp: , Rfl:   •  propranolol (INDERAL) 20 MG tablet, , Disp: , Rfl:   •  pseudoephedrine (SUDAFED) 30 MG tablet, Take 30 mg by mouth As Needed for Congestion. Sudogest, Disp: , Rfl:   •  traMADol (ULTRAM) 50 MG tablet, , Disp: , Rfl:   •  vitamin D (ERGOCALCIFEROL) 14378 UNITS capsule capsule, , Disp: , Rfl:     Objective     Vital Signs  Temp:  [97.6 °F (36.4 °C)] 97.6 °F (36.4 °C)  Heart Rate:  [89] 89  BP: (122-125)/(87-90) 125/87  Body mass index is  46.9 kg/m².  1    06/21/18  0931   Weight: 116 kg (256 lb 6.4 oz)       Physical Exam:     HEENT: extra ocular movement intact  Respiratory: appears well, vitals normal, no respiratory distress, acyanotic, normal RR, chest clear, no wheezing, crepitations, rhonchi, normal symmetric air entry  Cardiovascular: Regular rate and rhythm, S1, S2 normal, no murmur, click, rub or gallop  GI: Soft, non-tender, normal bowel sounds; no bruits, organomegaly or masses.  Abnormal shape: obese  Musculoskeletal: inspection - no abnormality  Neurologic: alert, oriented, normal speech, no focal findings or movement disorder noted       Results Review:   None        Assessment/Plan   Encounter Diagnoses   Name Primary?   • Obesity, Class III, BMI 40-49.9 (morbid obesity) Yes   • Gastroesophageal reflux disease, esophagitis presence not specified              1.  I believe this patient will be a good candidate for weight loss surgery.  I have discussed the Arnaldo - Y Gastric Bypass, laparoscopic sleeve gastrectomy and the Laparoscopic Gastric Band procedures.  We discussed the benefits of the surgeries including the benefit of weight loss and the possible reversal of co-morbid conditions associated with morbid obesity. I explained to the patient that prior to making a definitive decision on the type of surgery she will require an esophagogastroduodenoscopy with biopsies to assess for any contraindications for surgical weight loss.  I explained the alternatives  include not doing anything, or pursuing an UGI series which only offers a diagnosis with potential less accuracy compared to EGD, the benefits of the EGD such as identifying the pathology and anatomy of the upper GI system, and the risks and complications of the endoscopy were discussed in detail as well. I discussed the risk of perforation (one out of 3829-6272, riskier with dilation), bleeding (one out of 500), and the rare risks of infection, adverse reaction to anesthesia,  respiratory failure, cardiac failure including MI and adverse reaction to medications such as allergic reactions. We discussed consequences that could occur if a risk were to develop such as the need for hospitalization, blood transfusion, surgical intervention, medications, pain, disability and death. The patient verbalizes understanding and agrees to proceed. such as bleeding, perforation, swallowing difficulties and gas bloat can occur after this procedure.    Upon completion of our discussion and addressing and answering her questions to her satisfation, informed consent was obtained.  She will be scheduled accordingly for the esophagogastroduodenoscopy procedure.    I discussed the patients findings and my recommendations with patient.     Dr. Julio C Rodriguez MD [facility]    06/21/18  10:30 AM  Patient Care Team:  Shlomo Lira MD as PCP - General (Family Medicine)

## 2018-06-21 NOTE — PROGRESS NOTES
"Subjective      Cathi Krishnan is a 37 y.o. female who presents for her routine annual exam. Overall, patient reports feeling \"bustamante\".  Periods absent; the patient wonders if she could be in early menopause.  She has not had a spontaneous period since 2016.  She took monthly provera to induce withdrawal bleeding for a long time, but eventually that stopped working and she discontinued the medication.  The patient reports abd bloating, and feels like it started after she had her ESSURE done.    Current contraception: ESSURE  Regular self breast exam: yes  History of abnormal Pap smear: no  History of abnormal mammogram: N/A  Exercise: daily: walking 1-2 miles every morning, usually takes 30 minutes    Menstrual History:  OB History      Para Term  AB Living    3 2     1      SAB TAB Ectopic Molar Multiple Live Births                          No LMP recorded.       The following portions of the patient's history were reviewed and updated as appropriate: allergies, current medications, past family history, past medical history, past social history, past surgical history and problem list.    heterosexual    Family History  Family History   Problem Relation Age of Onset   • Hypertension Father    • Breast cancer Mother    • Ovarian cancer Mother    • Hypertension Mother    • Diabetes Mother    • Stroke Mother    • Coronary artery disease Mother    • Deep vein thrombosis Mother    • Obesity Mother    • Heart disease Mother    • Sleep apnea Mother    • Lung cancer Paternal Grandfather    • Hypertension Brother    • Obesity Brother    • Diabetes Brother    • Heart attack Brother    • Heart disease Brother    • Heart attack Maternal Grandmother    • Obesity Maternal Grandmother    • Obesity Brother        Review of Systems  Review of Systems   Constitutional: Negative for activity change and unexpected weight change.   HENT: Negative for congestion.    Respiratory: Negative for shortness of breath.  " "  Cardiovascular: Negative for chest pain.   Gastrointestinal: Positive for abdominal distention (patient reports frequent bloating). Negative for abdominal pain, blood in stool, constipation and diarrhea.   Endocrine: Positive for heat intolerance (\"hotter than I used to be\"). Negative for cold intolerance.   Genitourinary: Positive for dyspareunia (only occasionally) and menstrual problem (amenorrhea since 2016). Negative for pelvic pain and vaginal discharge.        No vaginal dryness   Musculoskeletal: Positive for back pain. Negative for arthralgias, neck pain and neck stiffness.   Skin: Negative for rash.   Neurological: Negative for dizziness and headaches.   Psychiatric/Behavioral: Positive for dysphoric mood. Negative for sleep disturbance. The patient is nervous/anxious.         Patient feels like anxiety and depression are both well-controlled with her medication           Objective        /90   Ht 157.5 cm (62\")   Wt 115 kg (253 lb)   BMI 46.27 kg/m²   Physical Exam   Constitutional: She is oriented to person, place, and time. She appears well-developed and well-nourished. No distress.   HENT:   Head: Normocephalic and atraumatic.   Eyes: EOM are normal.   Neck: Normal range of motion. Neck supple.   Cardiovascular: Normal rate and regular rhythm.    No murmur heard.  Pulmonary/Chest: Effort normal and breath sounds normal. Right breast exhibits no inverted nipple and no mass. Left breast exhibits no inverted nipple and no mass.   Abdominal: Soft. She exhibits no distension. There is no tenderness.   Exam limited by body habitus   Genitourinary: Vagina normal and uterus normal. No breast tenderness or discharge. Pelvic exam was performed with patient supine. There is no tenderness or lesion on the right labia. There is no tenderness or lesion on the left labia. Cervix exhibits no motion tenderness, no discharge and no friability. Right adnexum displays no tenderness and no fullness. Left adnexum " displays no tenderness and no fullness. No tenderness or bleeding in the vagina. No vaginal discharge found.   Genitourinary Comments: Exam limited by body habitus   Musculoskeletal: Normal range of motion. She exhibits no edema.   Neurological: She is alert and oriented to person, place, and time.   Skin: Skin is warm and dry.   Psychiatric: She has a normal mood and affect. Her behavior is normal. Judgment normal.   Nursing note and vitals reviewed.          Assessment  & Plan    Cathi was seen today for gynecologic exam.    Diagnoses and all orders for this visit:    Annual physical exam: Exam remarkable for BMI 46.9.  Patient considering bariatric surgery and was encouraged that weight loss might help regulate her menses.  Labs drawn and mammogram ordered.  Pap collected.  Former smoker.  -     Liquid-based Pap Smear, Screening  -     CBC & Differential  -     Comprehensive Metabolic Panel  -     Hemoglobin A1c  -     TSH  -     Mammo Screening Digital Tomosynthesis Bilateral With CAD; Future    Class 3 obesity due to excess calories with serious comorbidity and body mass index (BMI) of 45.0 to 49.9 in adult: Patient already has an appointment with bariatric program this week and anticipates that they will be scheduling surgery in upcoming months.    Former smoker  Comments:  quit in 2012    Amenorrhea, secondary: Patient previously treated with provera to induce withdrawal bleeding every month, but eventually that stopped working.  Hormones being drawn to make sure that she is not menopausal.  PCOS discussed, and she was encouraged that her attempts to lose weight may result in spontaneous return of menses if she has bariatric surgery.  -     Estradiol  -     Follicle Stimulating Hormone  -     Luteinizing Hormone    Family history of breast cancer in mother: mammogram ordered  Comments:  mother was in 40's when she was diagnosed        This note was electronically signed.    Jacy Metzger  MD  6/21/2018  8:46 AM

## 2018-06-22 LAB
ALBUMIN SERPL-MCNC: 4 G/DL (ref 3.5–5)
ALBUMIN/GLOB SERPL: 1.4 G/DL (ref 1.1–2.5)
ALP SERPL-CCNC: 89 U/L (ref 24–120)
ALT SERPL-CCNC: 32 U/L (ref 0–54)
AST SERPL-CCNC: 27 U/L (ref 7–45)
BASOPHILS # BLD AUTO: 0.05 10*3/MM3 (ref 0–0.2)
BASOPHILS NFR BLD AUTO: 0.5 % (ref 0–2)
BILIRUB SERPL-MCNC: 0.3 MG/DL (ref 0.1–1)
BUN SERPL-MCNC: 14 MG/DL (ref 5–21)
BUN/CREAT SERPL: 23.7 (ref 7–25)
CALCIUM SERPL-MCNC: 9.5 MG/DL (ref 8.4–10.4)
CHLORIDE SERPL-SCNC: 101 MMOL/L (ref 98–110)
CO2 SERPL-SCNC: 26 MMOL/L (ref 24–31)
CREAT SERPL-MCNC: 0.59 MG/DL (ref 0.5–1.4)
EOSINOPHIL # BLD AUTO: 0.24 10*3/MM3 (ref 0–0.7)
EOSINOPHIL NFR BLD AUTO: 2.5 % (ref 0–4)
ERYTHROCYTE [DISTWIDTH] IN BLOOD BY AUTOMATED COUNT: 13.6 % (ref 12–15)
ESTRADIOL SERPL-MCNC: 171.2 PG/ML
FSH SERPL-ACNC: 3.7 MIU/ML
GFR SERPLBLD CREATININE-BSD FMLA CKD-EPI: 115 ML/MIN/1.73
GFR SERPLBLD CREATININE-BSD FMLA CKD-EPI: 139 ML/MIN/1.73
GLOBULIN SER CALC-MCNC: 2.8 GM/DL
GLUCOSE SERPL-MCNC: 88 MG/DL (ref 70–100)
HBA1C MFR BLD: 5.3 %
HCT VFR BLD AUTO: 43.1 % (ref 37–47)
HGB BLD-MCNC: 13.8 G/DL (ref 12–16)
IMM GRANULOCYTES # BLD: 0.05 10*3/MM3 (ref 0–0.03)
IMM GRANULOCYTES NFR BLD: 0.5 % (ref 0–5)
LH SERPL-ACNC: 3.6 MIU/ML
LYMPHOCYTES # BLD AUTO: 1.69 10*3/MM3 (ref 0.72–4.86)
LYMPHOCYTES NFR BLD AUTO: 17.8 % (ref 15–45)
MCH RBC QN AUTO: 26.7 PG (ref 28–32)
MCHC RBC AUTO-ENTMCNC: 32 G/DL (ref 33–36)
MCV RBC AUTO: 83.5 FL (ref 82–98)
MONOCYTES # BLD AUTO: 0.44 10*3/MM3 (ref 0.19–1.3)
MONOCYTES NFR BLD AUTO: 4.6 % (ref 4–12)
NEUTROPHILS # BLD AUTO: 7.02 10*3/MM3 (ref 1.87–8.4)
NEUTROPHILS NFR BLD AUTO: 74.1 % (ref 39–78)
NRBC BLD AUTO-RTO: 0 /100 WBC (ref 0–0)
PLATELET # BLD AUTO: 319 10*3/MM3 (ref 130–400)
POTASSIUM SERPL-SCNC: 4.1 MMOL/L (ref 3.5–5.3)
PROT SERPL-MCNC: 6.8 G/DL (ref 6.3–8.7)
RBC # BLD AUTO: 5.16 10*6/MM3 (ref 4.2–5.4)
SODIUM SERPL-SCNC: 142 MMOL/L (ref 135–145)
TSH SERPL DL<=0.005 MIU/L-ACNC: 1.24 MIU/ML (ref 0.47–4.68)
WBC # BLD AUTO: 9.49 10*3/MM3 (ref 4.8–10.8)

## 2018-06-22 NOTE — PROGRESS NOTES
Please let patient know that her bloodwork all looked normal, and do not indicate that the patient is menopausal.  Thanks Visit Information Date & Time Provider Department Dept. Phone Encounter #  
 7/7/2017 10:00 AM [facility] [address] [facility] [facility] [phone] [id number] Your Appointments 7/7/2017 10:45 AM  
OB VISIT with Vivian Don DO  
[facility] ([facility]) Appt Note: ob  
 [address] [facility]  
  
   
 [address] [phone]  
  
    
 7/27/2017  1:30 PM  
Follow Up with Cassandra Cooper MD  
[address] [facility]) Appt Note: 3 week f-up [phone] [facility]  
  
   
 [address] Upcoming Health Maintenance Date Due  
 HPV AGE 9Y-34Y (1 of 3 - Female 3 Dose Series) 9/22/2006 INFLUENZA AGE 9 TO ADULT 8/1/2017 PAP AKA CERVICAL CYTOLOGY 2/9/2020 DTaP/Tdap/Td series (2 - Td) 6/6/2027 Allergies as of 7/7/2017  Review Complete On: 6/9/2017 By: Cassandra Cooper MD  
  
 Severity Noted Reaction Type Reactions Tramadol  09/19/2016    Itching Current Immunizations  Never Reviewed Name Date Rho(D) Immune Globulin - IM 2/18/2017 12:36 PM  
 Tdap 6/6/2017 Not reviewed this visit You Were Diagnosed With   
  
 Codes Comments Thrombocytopenia affecting pregnancy ([facility] [address]    -  Primary ICD-10-CM: O99.119, D69.6 ICD-9-CM: 649.30, 287.5 Vitals BP Pulse Temp Resp Height(growth percentile) Weight(growth percentile) 139/80 (BP 1 Location: Right arm, BP Patient Position: Sitting) 77 98.7 °F (37.1 °C) (Oral) 17 5' 4\" (1.626 m) 215 lb (97.5 kg) LMP SpO2 BMI OB Status Smoking Status 11/23/2016 100% 36.9 kg/m2 Pregnant Never Smoker BMI and BSA Data Body Mass Index Body Surface Area  
 36.9 kg/m 2 2.1 m 2 Preferred Pharmacy Pharmacy Name Phone  [place], [address] [facility] [phone] Your Updated Medication List  
  
   
This list is accurate as of: 7/7/17 10:36 AM.  Always use your most recent med list.  
  
  
  
  
 pnv w/o calcium-iron fum-fa 27-1 mg Tab Take  by mouth. terconazole 0.8 % vaginal cream  
Commonly known as:  TERAZOL 3 Insert 1 Applicator into vagina nightly. To-Do List   
 07/07/2017 Lab:  CBC WITH AUTOMATED DIFF Introducing hospitals & [facility] SERVICES! Dear Alex Para: Thank you for requesting a [employer] account. Our records indicate that you already have an active [employer] account. You can access your account anytime at [email or web address] Did you know that you can access your hospital and ER discharge instructions at any time in [employer]? You can also review all of your test results from your hospital stay or ER visit. Additional Information If you have questions, please visit the Frequently Asked Questions section of the [employer] website at [email or web address] Remember, [employer] is NOT to be used for urgent needs. For medical emergencies, dial 911. Now available from your iPhone and Android! Please provide this summary of care documentation to your next provider. Your primary care clinician is listed as NONE. If you have any questions after today's visit, please call [phone].

## 2018-06-27 LAB
GEN CATEG CVX/VAG CYTO-IMP: NORMAL
LAB AP CASE REPORT: NORMAL
LAB AP GYN ADDITIONAL INFORMATION: NORMAL
LAB AP GYN OTHER FINDINGS: NORMAL
PATH INTERP SPEC-IMP: NORMAL
STAT OF ADQ CVX/VAG CYTO-IMP: NORMAL

## 2018-07-23 ENCOUNTER — OFFICE VISIT (OUTPATIENT)
Dept: PSYCHIATRY | Age: 38
End: 2018-07-23
Payer: COMMERCIAL

## 2018-07-23 ENCOUNTER — OFFICE VISIT (OUTPATIENT)
Dept: BARIATRICS/WEIGHT MGMT | Facility: CLINIC | Age: 38
End: 2018-07-23

## 2018-07-23 VITALS
SYSTOLIC BLOOD PRESSURE: 122 MMHG | WEIGHT: 255.2 LBS | HEART RATE: 89 BPM | DIASTOLIC BLOOD PRESSURE: 81 MMHG | OXYGEN SATURATION: 99 % | HEIGHT: 62 IN | BODY MASS INDEX: 46.96 KG/M2

## 2018-07-23 VITALS
HEART RATE: 87 BPM | TEMPERATURE: 98.6 F | DIASTOLIC BLOOD PRESSURE: 77 MMHG | WEIGHT: 256.6 LBS | HEIGHT: 62 IN | SYSTOLIC BLOOD PRESSURE: 136 MMHG | OXYGEN SATURATION: 100 % | BODY MASS INDEX: 47.22 KG/M2

## 2018-07-23 DIAGNOSIS — F41.9 ANXIETY DISORDER, UNSPECIFIED TYPE: Primary | ICD-10-CM

## 2018-07-23 DIAGNOSIS — K21.9 GASTROESOPHAGEAL REFLUX DISEASE, ESOPHAGITIS PRESENCE NOT SPECIFIED: ICD-10-CM

## 2018-07-23 DIAGNOSIS — F32.A DEPRESSION, UNSPECIFIED DEPRESSION TYPE: ICD-10-CM

## 2018-07-23 DIAGNOSIS — E66.01 OBESITY, CLASS III, BMI 40-49.9 (MORBID OBESITY) (HCC): Primary | ICD-10-CM

## 2018-07-23 PROCEDURE — 99212 OFFICE O/P EST SF 10 MIN: CPT | Performed by: NURSE PRACTITIONER

## 2018-07-23 PROCEDURE — 90832 PSYTX W PT 30 MINUTES: CPT | Performed by: COUNSELOR

## 2018-07-23 NOTE — PATIENT INSTRUCTIONS
Cathi Krishnan has done well this month with healthy changes. Patient's weight has not changed any this month.   Today we discussed healthy changes in lifestyle, diet, and exercise.   Handout provided on perfect protein and post op vitamins.   Intensive behavioral therapy for obesity was done today.   Goals for this month are: 3 meals per day with protein at each; eat protein first, use smaller plate; exercise as advised.  Get mammogram scheduled and done.   Keep appt with [facility] today.   Follow up in one month for a weight recheck.

## 2018-07-23 NOTE — PROGRESS NOTES
"Subjective   Cathi Krishnan is a 37 y.o. female.     History of Present Illness   Cathi Krishnan is here with morbid obesity and desires to have surgery for weight loss. This is the patient's 4th visit to the office.  Patient has another appt to see [facility] today. She has had sleep study at at another facility that showed mild ELISA and no need for a machine. She has had TSH level done. She has mammogram ordered and will get that scheduled. She has had normal EKG. She has EGD scheduled for 7/31/18. Patient has been exercising by walking most mornings for 30 minutes. Patient has been making health dietary choices and eating 4 meals per day with protein at each. Patient has been eating 70 grams of protein per day. Patient is drinking 64 ounces of water per day.   Vitals:    07/23/18 0916   BP: 136/77   BP Location: Right arm   Patient Position: Sitting   Cuff Size: Adult   Pulse: 87   Temp: 98.6 °F (37 °C)   SpO2: 100%   Weight: 116 kg (256 lb 9.6 oz)   Height: 157.5 cm (62\")         The following portions of the patient's history were reviewed and updated as appropriate: allergies, current medications, past family history, past medical history, past social history, past surgical history and problem list.    Review of Systems   Constitutional: Positive for fatigue. Negative for activity change and appetite change.   HENT: Positive for trouble swallowing.    Eyes: Positive for visual disturbance.   Respiratory: Negative.  Negative for apnea, cough, chest tightness and shortness of breath.         Snoring   Cardiovascular: Negative.  Negative for chest pain, palpitations and leg swelling.   Gastrointestinal: Positive for constipation. Negative for abdominal pain, anal bleeding, nausea and vomiting.        Heartburn   Endocrine: Negative.    Genitourinary: Positive for frequency.   Musculoskeletal: Positive for arthralgias, back pain and neck pain.   Skin: Positive for rash.        In skin folds "   Allergic/Immunologic: Negative.    Neurological: Negative.  Negative for seizures and syncope.   Hematological: Negative.  Does not bruise/bleed easily.   Psychiatric/Behavioral: Positive for dysphoric mood and sleep disturbance. Negative for self-injury and suicidal ideas. The patient is nervous/anxious.        Objective   Physical Exam   Constitutional: She is oriented to person, place, and time. Vital signs are normal. She appears well-developed and well-nourished. She is cooperative. No distress.   HENT:   Head: Normocephalic and atraumatic.   Nose: Nose normal.   Mouth/Throat: Oropharynx is clear and moist. No oropharyngeal exudate or tonsillar abscesses.   Eyes: Pupils are equal, round, and reactive to light. Conjunctivae, EOM and lids are normal. Right eye exhibits no discharge. Left eye exhibits no discharge.   Glasses noted   Neck: Trachea normal. Neck supple. No JVD present. Carotid bruit is not present. No neck rigidity. No tracheal deviation present. No thyromegaly present.   Cardiovascular: Normal rate, regular rhythm, S1 normal, S2 normal and normal heart sounds.    Pulmonary/Chest: Effort normal and breath sounds normal. No stridor. No respiratory distress. She has no wheezes. She has no rales.   Abdominal: Soft. Bowel sounds are normal. She exhibits no distension. There is no tenderness.   obese   Musculoskeletal: She exhibits no edema.        Right shoulder: She exhibits normal strength.   Lymphadenopathy:     She has no cervical adenopathy.   Neurological: She is alert and oriented to person, place, and time. She has normal strength. No cranial nerve deficit.   Skin: Skin is warm, dry and intact. No rash noted.   Psychiatric: She has a normal mood and affect. Her speech is normal and behavior is normal.   Alert and oriented x 3   Vitals reviewed.      Assessment/Plan   Cathi was seen today for weight loss, obesity and nutrition counseling.    Diagnoses and all orders for this  visit:    Obesity, Class III, BMI 40-49.9 (morbid obesity) (CMS/[facility])    Gastroesophageal reflux disease, esophagitis presence not specified  Comments:  continue pepcid for now; has EGD on 7/31/18        Cathi Krishnan has done well this month with healthy changes. Patient's weight has not changed any this month.   Today we discussed healthy changes in lifestyle, diet, and exercise.   Handout provided on perfect protein and post op vitamins.   Intensive behavioral therapy for obesity was done today.   Goals for this month are: 3 meals per day with protein at each; eat protein first, use smaller plate; exercise as advised.  Get mammogram scheduled and done.   Keep appt with [facility] today.   Follow up in one month for a weight recheck.

## 2018-07-23 NOTE — PROGRESS NOTES
intact  Morbid ideation No  Suicide Assessment    no suicidal ideation    History:  Social History     Social History    Marital status: Unknown     Spouse name: N/A    Number of children: N/A    Years of education: N/A     Social History Main Topics    Smoking status: Never Smoker    Smokeless tobacco: Never Used    Alcohol use None    Drug use: Unknown    Sexual activity: Not Asked     Other Topics Concern    None     Social History Narrative    None       Medications:   Current Outpatient Prescriptions   Medication Sig Dispense Refill    DOCUSATE SODIUM PO Take 100 mg by mouth daily      DULoxetine (CYMBALTA) 60 MG extended release capsule Take 60 mg by mouth daily      vitamin D (ERGOCALCIFEROL) 64750 units CAPS capsule Take 5,000 Units by mouth once a week      ferrous sulfate 325 (65 Fe) MG tablet Take 325 mg by mouth daily      naproxen (NAPROSYN) 500 MG tablet Take 500 mg by mouth 2 times daily      propranolol (INDERAL) 20 MG tablet Take 20 mg by mouth three times daily      traMADol (ULTRAM) 50 MG tablet Take 50 mg by mouth 4 times daily. .       No current facility-administered medications for this visit. Social History:   Social History     Social History    Marital status: Unknown     Spouse name: N/A    Number of children: N/A    Years of education: N/A     Occupational History    Not on file. Social History Main Topics    Smoking status: Never Smoker    Smokeless tobacco: Never Used    Alcohol use Not on file    Drug use: Unknown    Sexual activity: Not on file     Other Topics Concern    Not on file     Social History Narrative    No narrative on file       TOBACCO:   reports that she has never smoked. She has never used smokeless tobacco.  ETOH:   has no alcohol history on file. Family History:   History reviewed. No pertinent family history. Diagnosis: Anxiety and depression; rule out mood disorder  History reviewed.  No pertinent past medical

## 2018-07-31 ENCOUNTER — ANESTHESIA (OUTPATIENT)
Dept: GASTROENTEROLOGY | Facility: HOSPITAL | Age: 38
End: 2018-07-31

## 2018-07-31 ENCOUNTER — ANESTHESIA EVENT (OUTPATIENT)
Dept: GASTROENTEROLOGY | Facility: HOSPITAL | Age: 38
End: 2018-07-31

## 2018-07-31 ENCOUNTER — HOSPITAL ENCOUNTER (OUTPATIENT)
Facility: HOSPITAL | Age: 38
Setting detail: HOSPITAL OUTPATIENT SURGERY
Discharge: HOME | End: 2018-07-31
Attending: SURGERY | Admitting: SURGERY

## 2018-07-31 VITALS
WEIGHT: 255.8 LBS | HEIGHT: 63 IN | OXYGEN SATURATION: 96 % | BODY MASS INDEX: 45.32 KG/M2 | SYSTOLIC BLOOD PRESSURE: 107 MMHG | TEMPERATURE: 97.8 F | RESPIRATION RATE: 19 BRPM | HEART RATE: 71 BPM | DIASTOLIC BLOOD PRESSURE: 61 MMHG

## 2018-07-31 DIAGNOSIS — E66.01 OBESITY, CLASS III, BMI 40-49.9 (MORBID OBESITY) (HCC): ICD-10-CM

## 2018-07-31 DIAGNOSIS — K21.9 GASTROESOPHAGEAL REFLUX DISEASE, ESOPHAGITIS PRESENCE NOT SPECIFIED: ICD-10-CM

## 2018-07-31 LAB — B-HCG UR QL: NEGATIVE

## 2018-07-31 PROCEDURE — 43239 EGD BIOPSY SINGLE/MULTIPLE: CPT | Performed by: SURGERY

## 2018-07-31 PROCEDURE — 81025 URINE PREGNANCY TEST: CPT | Performed by: ANESTHESIOLOGY

## 2018-07-31 PROCEDURE — 87081 CULTURE SCREEN ONLY: CPT | Performed by: SURGERY

## 2018-07-31 PROCEDURE — 88305 TISSUE EXAM BY PATHOLOGIST: CPT | Performed by: SURGERY

## 2018-07-31 PROCEDURE — 25010000002 PROPOFOL 10 MG/ML EMULSION: Performed by: NURSE ANESTHETIST, CERTIFIED REGISTERED

## 2018-07-31 RX ORDER — PROPOFOL 10 MG/ML
VIAL (ML) INTRAVENOUS AS NEEDED
Status: DISCONTINUED | OUTPATIENT
Start: 2018-07-31 | End: 2018-07-31 | Stop reason: SURG

## 2018-07-31 RX ORDER — SODIUM CHLORIDE 9 MG/ML
100 INJECTION, SOLUTION INTRAVENOUS CONTINUOUS
Status: DISCONTINUED | OUTPATIENT
Start: 2018-07-31 | End: 2018-07-31 | Stop reason: HOSPADM

## 2018-07-31 RX ORDER — SODIUM CHLORIDE 0.9 % (FLUSH) 0.9 %
1-10 SYRINGE (ML) INJECTION AS NEEDED
Status: DISCONTINUED | OUTPATIENT
Start: 2018-07-31 | End: 2018-07-31 | Stop reason: HOSPADM

## 2018-07-31 RX ORDER — METOPROLOL TARTRATE 5 MG/5ML
2 INJECTION INTRAVENOUS ONCE AS NEEDED
Status: COMPLETED | OUTPATIENT
Start: 2018-07-31 | End: 2018-07-31

## 2018-07-31 RX ORDER — LIDOCAINE HYDROCHLORIDE 20 MG/ML
INJECTION, SOLUTION INFILTRATION; PERINEURAL AS NEEDED
Status: DISCONTINUED | OUTPATIENT
Start: 2018-07-31 | End: 2018-07-31 | Stop reason: SURG

## 2018-07-31 RX ADMIN — PROPOFOL 30 MG: 10 INJECTION, EMULSION INTRAVENOUS at 08:38

## 2018-07-31 RX ADMIN — SODIUM CHLORIDE 100 ML/HR: 9 INJECTION, SOLUTION INTRAVENOUS at 07:40

## 2018-07-31 RX ADMIN — PROPOFOL 50 MG: 10 INJECTION, EMULSION INTRAVENOUS at 08:35

## 2018-07-31 RX ADMIN — LIDOCAINE HYDROCHLORIDE 100 MG: 20 INJECTION, SOLUTION INFILTRATION; PERINEURAL at 08:33

## 2018-07-31 RX ADMIN — PROPOFOL 50 MG: 10 INJECTION, EMULSION INTRAVENOUS at 08:34

## 2018-07-31 RX ADMIN — PROPOFOL 50 MG: 10 INJECTION, EMULSION INTRAVENOUS at 08:36

## 2018-07-31 RX ADMIN — METOPROLOL TARTRATE 2 MG: 5 INJECTION, SOLUTION INTRAVENOUS at 07:49

## 2018-07-31 NOTE — ANESTHESIA POSTPROCEDURE EVALUATION
"Patient: Cathi Krishnan    Procedure Summary     Date:  07/31/18 Room / Location:   [facility] /  [facility]    Anesthesia Start:  0830 Anesthesia Stop:  0840    Procedure:  ESOPHAGOGASTRODUODENOSCOPY WITH ANESTHESIA (N/A ) Diagnosis:       Obesity, Class III, BMI 40-49.9 (morbid obesity) (CMS/[facility])      Gastroesophageal reflux disease, esophagitis presence not specified      (Obesity, Class III, BMI 40-49.9 (morbid obesity) [E66.01])      (Gastroesophageal reflux disease, esophagitis presence not specified [K21.9])    Surgeon:  Julio C Rodriguez MD Provider:  Brinda Narayanan CRNA    Anesthesia Type:  general ASA Status:  3          Anesthesia Type: general  Last vitals  BP   124/81 (07/31/18 0720)   Temp   97.8 °F (36.6 °C) (07/31/18 0720)   Pulse   85 (07/31/18 0720)   Resp   16 (07/31/18 0720)     SpO2   99 % (07/31/18 0720)     Post Anesthesia Care and Evaluation    Patient location during evaluation: PHASE II  Patient participation: complete - patient participated  Level of consciousness: awake and alert  Pain score: 0  Pain management: adequate  Airway patency: patent  Anesthetic complications: No anesthetic complications    Cardiovascular status: acceptable and stable  Respiratory status: acceptable and unassisted  Hydration status: stable    Comments: Blood pressure 124/81, pulse 85, temperature 97.8 °F (36.6 °C), temperature source Temporal Artery , resp. rate 16, height 160 cm (63\"), weight 116 kg (255 lb 12.8 oz), last menstrual period 12/28/2017, SpO2 99 %.  Patient discharged from [facility] per protocol        "

## 2018-07-31 NOTE — ANESTHESIA PREPROCEDURE EVALUATION
Anesthesia Evaluation     no history of anesthetic complications:  NPO Solid Status: > 8 hours  NPO Liquid Status: > 8 hours           Airway   Mallampati: I  TM distance: >3 FB  Neck ROM: full  Dental    (+) edentulous, lower dentures and upper dentures    Pulmonary - normal exam    breath sounds clear to auscultation  (-) asthma, recent URI, sleep apnea, not a smoker  Cardiovascular - normal exam  Exercise tolerance: good (4-7 METS)    Patient on routine beta blocker and Beta blocker given within 24 hours of surgery  Rhythm: regular  Rate: normal    (-) pacemaker, hypertension, past MI, angina, cardiac stents, CABG      Neuro/Psych  (-) seizures, TIA, CVA  GI/Hepatic/Renal/Endo    (+) morbid obesity, GERD well controlled,    (-) liver disease, no renal disease, diabetes, hypothyroidism, hyperthyroidism    Musculoskeletal     Abdominal   (+) obese,    Substance History      OB/GYN          Other                        Anesthesia Plan    ASA 3     general   total IV anesthesia  intravenous induction   Anesthetic plan and risks discussed with patient.

## 2018-08-01 LAB
LAB AP CASE REPORT: NORMAL
PATH REPORT.FINAL DX SPEC: NORMAL
PATH REPORT.GROSS SPEC: NORMAL
UREASE TISS QL: POSITIVE

## 2018-08-03 RX ORDER — BISMUTH CITRATE
120 POWDER (GRAM) MISCELLANEOUS 4 TIMES DAILY
Qty: 4.8 G | Refills: 0 | Status: SHIPPED | OUTPATIENT
Start: 2018-08-03 | End: 2018-08-13

## 2018-08-03 RX ORDER — METRONIDAZOLE 500 MG/1
500 TABLET ORAL EVERY 8 HOURS SCHEDULED
Qty: 30 TABLET | Refills: 0 | Status: SHIPPED | OUTPATIENT
Start: 2018-08-03 | End: 2018-08-13

## 2018-08-03 RX ORDER — TETRACYCLINE HYDROCHLORIDE 500 MG/1
500 CAPSULE ORAL 4 TIMES DAILY
Qty: 40 CAPSULE | Refills: 0 | Status: SHIPPED | OUTPATIENT
Start: 2018-08-03 | End: 2018-08-13

## 2018-08-03 RX ORDER — PANTOPRAZOLE SODIUM 40 MG/1
40 TABLET, DELAYED RELEASE ORAL DAILY
Qty: 30 TABLET | Refills: 1 | Status: SHIPPED | OUTPATIENT
Start: 2018-08-03 | End: 2018-09-28 | Stop reason: SDUPTHER

## 2018-08-10 ENCOUNTER — RESULTS ENCOUNTER (OUTPATIENT)
Dept: BARIATRICS/WEIGHT MGMT | Facility: CLINIC | Age: 38
End: 2018-08-10

## 2018-08-10 DIAGNOSIS — E66.01 OBESITY, CLASS III, BMI 40-49.9 (MORBID OBESITY) (HCC): ICD-10-CM

## 2018-08-16 ENCOUNTER — OFFICE VISIT (OUTPATIENT)
Dept: PSYCHIATRY | Age: 38
End: 2018-08-16
Payer: COMMERCIAL

## 2018-08-16 VITALS
WEIGHT: 252.3 LBS | OXYGEN SATURATION: 97 % | HEART RATE: 91 BPM | HEIGHT: 62 IN | SYSTOLIC BLOOD PRESSURE: 120 MMHG | BODY MASS INDEX: 46.43 KG/M2 | DIASTOLIC BLOOD PRESSURE: 77 MMHG

## 2018-08-16 DIAGNOSIS — F41.9 ANXIETY: Primary | ICD-10-CM

## 2018-08-16 DIAGNOSIS — F33.1 MODERATE RECURRENT MAJOR DEPRESSION (HCC): ICD-10-CM

## 2018-08-16 PROCEDURE — 99205 OFFICE O/P NEW HI 60 MIN: CPT | Performed by: CLINICAL NURSE SPECIALIST

## 2018-08-16 RX ORDER — DULOXETIN HYDROCHLORIDE 60 MG/1
120 CAPSULE, DELAYED RELEASE ORAL DAILY
Qty: 60 CAPSULE | Refills: 2 | Status: SHIPPED | OUTPATIENT
Start: 2018-08-16 | End: 2018-10-22 | Stop reason: SDUPTHER

## 2018-08-16 RX ORDER — PROPRANOLOL HYDROCHLORIDE 20 MG/1
20 TABLET ORAL 3 TIMES DAILY PRN
Qty: 90 TABLET | Refills: 2 | Status: SHIPPED | OUTPATIENT
Start: 2018-08-16 | End: 2019-01-25 | Stop reason: SDUPTHER
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
## 2018-08-23 ENCOUNTER — OFFICE VISIT (OUTPATIENT)
Dept: BARIATRICS/WEIGHT MGMT | Facility: CLINIC | Age: 38
End: 2018-08-23

## 2018-08-23 ENCOUNTER — OFFICE VISIT (OUTPATIENT)
Dept: PSYCHIATRY | Age: 38
End: 2018-08-23
Payer: COMMERCIAL

## 2018-08-23 VITALS
DIASTOLIC BLOOD PRESSURE: 69 MMHG | BODY MASS INDEX: 46.46 KG/M2 | HEIGHT: 62 IN | WEIGHT: 252.5 LBS | SYSTOLIC BLOOD PRESSURE: 117 MMHG | OXYGEN SATURATION: 99 % | HEART RATE: 80 BPM

## 2018-08-23 VITALS
HEIGHT: 62 IN | OXYGEN SATURATION: 100 % | HEART RATE: 84 BPM | WEIGHT: 254 LBS | BODY MASS INDEX: 46.74 KG/M2 | TEMPERATURE: 96.6 F | DIASTOLIC BLOOD PRESSURE: 75 MMHG | SYSTOLIC BLOOD PRESSURE: 140 MMHG

## 2018-08-23 DIAGNOSIS — E66.01 OBESITY, CLASS III, BMI 40-49.9 (MORBID OBESITY) (HCC): Primary | ICD-10-CM

## 2018-08-23 DIAGNOSIS — F41.9 ANXIETY: Primary | ICD-10-CM

## 2018-08-23 DIAGNOSIS — A04.8 H. PYLORI INFECTION: ICD-10-CM

## 2018-08-23 DIAGNOSIS — F32.A DEPRESSION, UNSPECIFIED DEPRESSION TYPE: ICD-10-CM

## 2018-08-23 PROCEDURE — 90832 PSYTX W PT 30 MINUTES: CPT | Performed by: COUNSELOR

## 2018-08-23 PROCEDURE — 99213 OFFICE O/P EST LOW 20 MIN: CPT | Performed by: NURSE PRACTITIONER

## 2018-08-23 NOTE — PROGRESS NOTES
Therapy Progress Note  [facility]  8/23/2018  10:28 AM      Time spent with Patient: 30 minutes  This is patient's third  Therapy appointment. Reason for Consult:  depression, anxiety and stress  Referring Provider: No referring provider defined for this encounter. Jimenez Marcus ,a 40 y.o. female, for initial evaluation visit. Pt provided informed consent for the behavioral health program. Discussed with patient model of service to include the limits of confidentiality (i.e. abuse reporting, suicide intervention, etc.) and short-term intervention focused approach. Discussed no show and late cancellation policy. Pt indicated understanding. S:  Pt states her dog has been missing since last Thursday. She has been down because of this. It's added increased stress. She has been using her relaxation techniques from the handout given last session. She is listening to Passive Progressive Muscle relaxation videos and perceives it to be beneficial in decreasing her racing thoughts at night so she can go to sleep faster. Discussed positive journal prompts. Pt states she use to journal and found it to be beneficial. Denies SI, HI and AVH at this time. MSE:    Appearance    alert, cooperative  Appetite normal- still working on portion control and trying to lose weight.   Sleep disturbance has been waking up earlier than normal.   Fatigue Yes  Loss of pleasure No  Impulsive behavior No  Speech    normal rate and normal volume  Mood    Anxious  Depressed  Affect    anxiety  Thought Content    Excessive worry  Thought Process    goal directed  Associations    logical connections  Insight    Good  Judgment    Intact  Orientation    oriented to person, place, time, and general circumstances  Memory    recent and remote memory intact  Attention/Concentration    intact  Morbid ideation No  Suicide Assessment    no suicidal ideation    History:  Social History     Social History    Marital status: Unknown Spouse name: N/A    Number of children: N/A    Years of education: N/A     Social History Main Topics    Smoking status: Never Smoker    Smokeless tobacco: Never Used    Alcohol use None    Drug use: Unknown    Sexual activity: Not Asked     Other Topics Concern    None     Social History Narrative    None       Medications:   Current Outpatient Prescriptions   Medication Sig Dispense Refill    DULoxetine (CYMBALTA) 60 MG extended release capsule Take 2 capsules by mouth daily For mood and anxiety 60 capsule 2    propranolol (INDERAL) 20 MG tablet Take 1 tablet by mouth 3 times daily as needed (for anxiety, for panic) 90 tablet 2    DOCUSATE SODIUM PO Take 100 mg by mouth daily      vitamin D (ERGOCALCIFEROL) 41561 units CAPS capsule Take 5,000 Units by mouth once a week      ferrous sulfate 325 (65 Fe) MG tablet Take 325 mg by mouth daily      naproxen (NAPROSYN) 500 MG tablet Take 500 mg by mouth 2 times daily      traMADol (ULTRAM) 50 MG tablet Take 50 mg by mouth 4 times daily. .       No current facility-administered medications for this visit. Social History:   Social History     Social History    Marital status: Unknown     Spouse name: N/A    Number of children: N/A    Years of education: N/A     Occupational History    Not on file. Social History Main Topics    Smoking status: Never Smoker    Smokeless tobacco: Never Used    Alcohol use Not on file    Drug use: Unknown    Sexual activity: Not on file     Other Topics Concern    Not on file     Social History Narrative    No narrative on file       TOBACCO:   reports that she has never smoked. She has never used smokeless tobacco.  ETOH:   has no alcohol history on file. Family History:   History reviewed. No pertinent family history. Diagnosis:    Depression and anxiety  History reviewed. No pertinent past medical history. Other psychosocial and environmental problems    Plan:  1.  Continue medication management  2. CBT to target depression and anxiety  3.  Discuss cognitive restructuring- pt already completed \"Countering Anxiety\" worksheet    Pt interventions:  Discussed use of imagery, distractions, relaxation, mood management, communication training, questioning unhelpful thinking, problem-solving, and behavioral activation to manage pain, Established rapport, Supportive techniques, Emphasized self-care as important for managing overall health, CBT to target depression and anxiety and Identified maladaptive thoughts      [facility]

## 2018-08-23 NOTE — PATIENT INSTRUCTIONS
Cathi Krishnan has done well this month with healthy changes. Patient has lost 2 pounds. Today we discussed healthy changes in lifestyle, diet, and exercise.   Handout provided on weight proofing your home.   Intensive behavioral therapy for obesity was done today.   Goals for this month are: 3 meals per day with protein at each; eat protein first, use smaller plate; exercise as advised.  Stool study has been ordered. Complete it in about 3-4 weeks. Go to lab and  stool kit today for collection.   Keep appt for mammogram tomorrow.   Office will call and get psych clearance letter from [facility].   Follow up in one month for a weight recheck.

## 2018-08-23 NOTE — PROGRESS NOTES
"Subjective   Cathi Krishnan is a 37 y.o. female.     History of Present Illness   Cathi Krishnan is here with morbid obesity and desires to have surgery for weight loss. This is the patient's 5th visit to the office.  Patient has been cleared by [facility], per her statement. Office will call and obtain letter. She has had normal EKG. She has had TSH done. She will have mammogram done tomorrow. She has had sleep study done and she reports that she did not need sleep machine. She had EGD done and tested positive for H. Pylori. She states that she completed treatment for that about one week ago. She will have stool test ordered today to have collected in about three weeks for eradication testing. Patient has been exercising by walking daily for 30 minutes. Patient has been making health dietary choices and eating 4 meals per day with protein at each. Patient has been eating 100 grams of protein per day. Patient is drinking 64 ounces of water per day. She has not been drinking any soda.  Vitals:    08/23/18 0925   BP: 140/75   BP Location: Right arm   Patient Position: Sitting   Cuff Size: Adult   Pulse: 84   Temp: 96.6 °F (35.9 °C)   SpO2: 100%   Weight: 115 kg (254 lb)   Height: 157.5 cm (62\")         The following portions of the patient's history were reviewed and updated as appropriate: allergies, current medications, past family history, past medical history, past social history, past surgical history and problem list.    Review of Systems   Constitutional: Negative for activity change, appetite change and fatigue.   HENT: Negative.    Eyes: Negative.    Respiratory: Negative.  Negative for apnea, cough, chest tightness and shortness of breath.         Snoring; negative sleep study   Cardiovascular: Negative.  Negative for chest pain, palpitations and leg swelling.   Gastrointestinal: Positive for constipation. Negative for abdominal pain, anal bleeding, nausea and vomiting.   Endocrine: Negative.  "   Genitourinary: Negative.    Musculoskeletal: Positive for back pain and neck pain. Negative for arthralgias.   Skin: Negative.    Allergic/Immunologic: Negative.    Neurological: Negative.  Negative for seizures and syncope.   Hematological: Negative.  Does not bruise/bleed easily.   Psychiatric/Behavioral: Positive for dysphoric mood. Negative for self-injury and sleep disturbance. The patient is nervous/anxious.        Objective   Physical Exam   Constitutional: She is oriented to person, place, and time. Vital signs are normal. She appears well-developed and well-nourished. She is cooperative. No distress.   HENT:   Head: Normocephalic and atraumatic.   Nose: Nose normal.   Mouth/Throat: Oropharynx is clear and moist. No oropharyngeal exudate or tonsillar abscesses.   Eyes: Pupils are equal, round, and reactive to light. Conjunctivae, EOM and lids are normal. Right eye exhibits no discharge. Left eye exhibits no discharge.   Glasses noted   Neck: Trachea normal. Neck supple. No JVD present. Carotid bruit is not present. No neck rigidity. No tracheal deviation present. No thyromegaly present.   Cardiovascular: Normal rate, regular rhythm, S1 normal, S2 normal and normal heart sounds.    Pulmonary/Chest: Effort normal and breath sounds normal. No stridor. No respiratory distress. She has no wheezes. She has no rales.   Abdominal: Soft. Bowel sounds are normal. She exhibits no distension. There is no tenderness.   obese   Musculoskeletal: She exhibits no edema.        Right shoulder: She exhibits normal strength.   Lymphadenopathy:     She has no cervical adenopathy.   Neurological: She is alert and oriented to person, place, and time. She has normal strength. No cranial nerve deficit.   Skin: Skin is warm, dry and intact. No rash noted.   Psychiatric: She has a normal mood and affect. Her speech is normal and behavior is normal.   Alert and oriented x 3   Vitals reviewed.      Assessment/Plan   Cathi was seen  today for follow-up, obesity, nutrition counseling and weight loss.    Diagnoses and all orders for this visit:    Obesity, Class III, BMI 40-49.9 (morbid obesity) (CMS/[facility])  Comments:  working toward bariatric surgery    H. pylori infection  Comments:  stool study will be ordered today to be processed by next visit   Orders:  -     H. Pylori Antigen, Stool - Stool, Per Rectum; Future          Cathi Krishnan has done well this month with healthy changes. Patient has lost 2 pounds. Today we discussed healthy changes in lifestyle, diet, and exercise.   Handout provided on weight proofing your home.   Intensive behavioral therapy for obesity was done today.   Goals for this month are: 3 meals per day with protein at each; eat protein first, use smaller plate; exercise as advised.  Stool study has been ordered. Complete it in about 3-4 weeks. Go to lab and  stool kit today for collection.   Keep appt for mammogram tomorrow.   Office [facility] call and [facility]   Follow up in one month for a weight recheck.

## 2018-08-30 DIAGNOSIS — Z00.00 ANNUAL PHYSICAL EXAM: ICD-10-CM

## 2018-09-28 ENCOUNTER — OFFICE VISIT (OUTPATIENT)
Dept: PSYCHIATRY | Age: 38
End: 2018-09-28
Payer: COMMERCIAL

## 2018-09-28 ENCOUNTER — LAB (OUTPATIENT)
Dept: LAB | Facility: HOSPITAL | Age: 38
End: 2018-09-28
Attending: NURSE PRACTITIONER

## 2018-09-28 ENCOUNTER — OFFICE VISIT (OUTPATIENT)
Dept: BARIATRICS/WEIGHT MGMT | Facility: CLINIC | Age: 38
End: 2018-09-28

## 2018-09-28 VITALS
SYSTOLIC BLOOD PRESSURE: 140 MMHG | WEIGHT: 251.8 LBS | HEIGHT: 62 IN | OXYGEN SATURATION: 100 % | TEMPERATURE: 97.8 F | DIASTOLIC BLOOD PRESSURE: 77 MMHG | HEART RATE: 81 BPM | BODY MASS INDEX: 46.33 KG/M2

## 2018-09-28 DIAGNOSIS — E66.01 OBESITY, CLASS III, BMI 40-49.9 (MORBID OBESITY) (HCC): Primary | ICD-10-CM

## 2018-09-28 DIAGNOSIS — A04.8 H. PYLORI INFECTION: ICD-10-CM

## 2018-09-28 DIAGNOSIS — K21.9 GASTROESOPHAGEAL REFLUX DISEASE, ESOPHAGITIS PRESENCE NOT SPECIFIED: ICD-10-CM

## 2018-09-28 DIAGNOSIS — F41.9 ANXIETY: ICD-10-CM

## 2018-09-28 DIAGNOSIS — R46.81 OBSESSIVE-COMPULSIVE SYMPTOMS: Primary | ICD-10-CM

## 2018-09-28 PROCEDURE — 99212 OFFICE O/P EST SF 10 MIN: CPT | Performed by: NURSE PRACTITIONER

## 2018-09-28 PROCEDURE — 87338 HPYLORI STOOL AG IA: CPT | Performed by: NURSE PRACTITIONER

## 2018-09-28 PROCEDURE — 90832 PSYTX W PT 30 MINUTES: CPT | Performed by: COUNSELOR

## 2018-09-28 RX ORDER — PANTOPRAZOLE SODIUM 40 MG/1
40 TABLET, DELAYED RELEASE ORAL DAILY
Qty: 30 TABLET | Refills: 2 | Status: SHIPPED | OUTPATIENT
Start: 2018-09-28 | End: 2018-11-01 | Stop reason: SDUPTHER

## 2018-09-28 NOTE — PROGRESS NOTES
"Subjective   Cathi Krishnan is a 38 y.o. female.     History of Present Illness   Cathi Krishnan is here with morbid obesity and desires to have surgery for weight loss. This is the patient's 6th visit to the office.  Patient has had all clearances and testing completed. She will obtain tentative surgery date today. Patient has been exercising by walking daily for 30 minutes.  Patient has been making health dietary choices and eating 4 meals per day with protein at each. Patient has been eating 95 grams of protein per day. Patient is drinking 64 ounces of water per day. She is not drinking any soda.   Vitals:    09/28/18 0945   BP: 140/77   BP Location: Right arm   Patient Position: Sitting   Cuff Size: Adult   Pulse: 81   Temp: 97.8 °F (36.6 °C)   SpO2: 100%   Weight: 114 kg (251 lb 12.8 oz)   Height: 157.5 cm (62\")         The following portions of the patient's history were reviewed and updated as appropriate: allergies, current medications, past family history, past medical history, past social history, past surgical history and problem list.    Review of Systems   Constitutional: Negative for activity change and appetite change.   HENT: Negative.    Eyes: Positive for visual disturbance.   Respiratory: Negative.  Negative for apnea, cough, chest tightness and shortness of breath.    Cardiovascular: Positive for leg swelling. Negative for chest pain and palpitations.   Gastrointestinal: Negative.  Negative for abdominal pain, anal bleeding, constipation, nausea and vomiting.   Endocrine: Negative.    Genitourinary: Positive for frequency.   Musculoskeletal: Positive for arthralgias, back pain and neck pain.   Skin: Negative.    Allergic/Immunologic: Negative.    Neurological: Negative.  Negative for seizures and syncope.   Hematological: Negative.  Does not bruise/bleed easily.   Psychiatric/Behavioral: Positive for dysphoric mood. Negative for self-injury, sleep disturbance and suicidal ideas. The patient " is nervous/anxious.        Objective   Physical Exam   Constitutional: She is oriented to person, place, and time. Vital signs are normal. She appears well-developed and well-nourished. She is cooperative. No distress.   HENT:   Head: Normocephalic and atraumatic.   Nose: Nose normal.   Mouth/Throat: Oropharynx is clear and moist. No oropharyngeal exudate or tonsillar abscesses.   Eyes: Pupils are equal, round, and reactive to light. Conjunctivae, EOM and lids are normal. Right eye exhibits no discharge. Left eye exhibits no discharge.   Glasses noted   Neck: Trachea normal. Neck supple. No JVD present. Carotid bruit is not present. No neck rigidity. No tracheal deviation present. No thyromegaly present.   Cardiovascular: Normal rate, regular rhythm, S1 normal, S2 normal and normal heart sounds.    Pulmonary/Chest: Effort normal and breath sounds normal. No stridor. No respiratory distress. She has no wheezes. She has no rales.   Abdominal: Soft. Bowel sounds are normal. She exhibits no distension. There is no tenderness.   obese   Musculoskeletal: She exhibits no edema.        Right shoulder: She exhibits normal strength.   Lymphadenopathy:     She has no cervical adenopathy.   Neurological: She is alert and oriented to person, place, and time. She has normal strength. No cranial nerve deficit.   Skin: Skin is warm, dry and intact. No rash noted.   Psychiatric: She has a normal mood and affect. Her speech is normal and behavior is normal.   Alert and oriented x 3   Vitals reviewed.      Assessment/Plan   Cathi was seen today for follow-up, obesity, nutrition counseling and weight loss.    Diagnoses and all orders for this visit:    Obesity, Class III, BMI 40-49.9 (morbid obesity) (CMS/[facility])  Comments:  obtain tentative surgery date today    Gastroesophageal reflux disease, esophagitis presence not specified  Comments:  refill protonix    Other orders  -     pantoprazole (PROTONIX) 40 MG EC tablet; Take 1 tablet  by mouth Daily.          Cathi JACQUI Krishnan has done well this month with healthy changes. Patient has lost 3 pounds. Today we discussed healthy changes in lifestyle, diet, and exercise.   Handout provided on mindful eating.  Start  eating and drinking by 30 minutes.  Unjury shake sample and clear protein drink provided.  Intensive behavioral therapy for obesity was done today.   Goals for this month are: 3 meals per day with protein at each; eat protein first, use smaller plate; exercise as advised.  Follow up in one month for possible surgery submission.

## 2018-09-28 NOTE — PROGRESS NOTES
Therapy Progress Note  [facility] LATASHA  9/28/2018  8:25 AM      Time spent with Patient: 25 minutes  This is patient's fourth  Therapy appointment. Reason for Consult:  depression, anxiety and stress  Referring Provider: No referring provider defined for this encounter. Alton Holbrook ,a 45 y.o. female, for initial evaluation visit. Pt provided informed consent for the behavioral health program. Discussed with patient model of service to include the limits of confidentiality (i.e. abuse reporting, suicide intervention, etc.) and short-term intervention focused approach. Discussed no show and late cancellation policy. Pt indicated understanding. S:  Pt has been using the positive journal prompts daily and perceives it beneficial. Pt is working on decreasing her compulsion to clean. Before she would clean up after the baby every time it made a mess or had something out. Now she tries to wait until the end of the day. Feels like her OCD tendencies are only with cleaning her home. It does take an excessive amount of time out of her day. Pt states this morning her daughter got mad because she asked her to walk her out to the bus and she had to finish what she was cleaning first. Denies SI, HI and AVH at this time. MSE:    Appearance    alert, cooperative  Appetite has continued to lose weight gradually. Goes today for her bariatric appointment to schedule surgery. Sleep disturbance 5 hours per night due to back pain. Pt is considering trying Melatonin.    Fatigue Yes  Loss of pleasure No  Impulsive behavior No  Speech    normal rate and normal volume  Mood    Anxious  Affect    normal affect  Thought Content    obsessions and cognitive distortions  Thought Process    linear  Associations    logical connections  Insight    Good  Judgment    Intact  Orientation    oriented to person, place, time, and general circumstances  Memory    recent and remote memory intact  Attention/Concentration past medical history on file. Problems related to the social environment and Other psychosocial and environmental problems    Plan:  1. Continue medication management  2.  CBT to target depression and anxiety    Pt interventions:  Provided education, Discussed self-care (sleep, nutrition, rewarding activities, social support, exercise), Discussed use of imagery, distractions, relaxation, mood management, communication training, questioning unhelpful thinking, problem-solving, and behavioral activation to manage pain, Supportive techniques, Emphasized self-care as important for managing overall health and CBT to target anxiety and stress      [address] [facility]

## 2018-10-01 ENCOUNTER — TELEPHONE (OUTPATIENT)
Dept: BARIATRICS/WEIGHT MGMT | Facility: CLINIC | Age: 38
End: 2018-10-01

## 2018-10-01 DIAGNOSIS — A04.8 H. PYLORI INFECTION: Primary | ICD-10-CM

## 2018-10-01 LAB — H PYLORI AG STL QL IA: POSITIVE

## 2018-10-01 RX ORDER — PANTOPRAZOLE SODIUM 40 MG/1
40 TABLET, DELAYED RELEASE ORAL DAILY
Qty: 14 TABLET | Refills: 0 | Status: SHIPPED | OUTPATIENT
Start: 2018-10-01 | End: 2018-10-15

## 2018-10-01 RX ORDER — TETRACYCLINE HYDROCHLORIDE 500 MG/1
500 CAPSULE ORAL 4 TIMES DAILY
Qty: 56 CAPSULE | Refills: 0 | Status: SHIPPED | OUTPATIENT
Start: 2018-10-01 | End: 2018-10-29

## 2018-10-01 RX ORDER — METRONIDAZOLE 500 MG/1
500 TABLET ORAL 2 TIMES DAILY
Qty: 28 TABLET | Refills: 0 | Status: SHIPPED | OUTPATIENT
Start: 2018-10-01 | End: 2018-10-12

## 2018-10-01 NOTE — TELEPHONE ENCOUNTER
Patient returned call. Was advised of positive H. Pylori in stool. Will retreat. Meds sent to pharmacy. Take as directed. Recheck stool in about one month. Order placed into computer.

## 2018-10-01 NOTE — TELEPHONE ENCOUNTER
H. pylori stool antigen is positive.  Left voice message for patient to call back as retreatment will be needed.  Await her return phone call.

## 2018-10-12 ENCOUNTER — OFFICE VISIT (OUTPATIENT)
Dept: OBSTETRICS AND GYNECOLOGY | Facility: CLINIC | Age: 38
End: 2018-10-12

## 2018-10-12 VITALS
DIASTOLIC BLOOD PRESSURE: 80 MMHG | SYSTOLIC BLOOD PRESSURE: 124 MMHG | WEIGHT: 245 LBS | BODY MASS INDEX: 45.08 KG/M2 | HEIGHT: 62 IN

## 2018-10-12 DIAGNOSIS — N91.2 AMENORRHEA: Primary | ICD-10-CM

## 2018-10-12 PROCEDURE — 99213 OFFICE O/P EST LOW 20 MIN: CPT | Performed by: OBSTETRICS & GYNECOLOGY

## 2018-10-12 RX ORDER — MEDROXYPROGESTERONE ACETATE 10 MG/1
10 TABLET ORAL DAILY
COMMUNITY
End: 2018-10-30

## 2018-10-12 RX ORDER — FAMOTIDINE 20 MG/1
20 TABLET, FILM COATED ORAL 2 TIMES DAILY
COMMUNITY
End: 2018-10-29

## 2018-10-12 NOTE — PROGRESS NOTES
"Subjective   Cathi Krishnan is a 38 y.o. female  YOB: 1980    Chief Complaint   Patient presents with   • Amenorrhea     pt here today with c/o amenorrhea. pt says she had not had a period since 2017 and then started bleeding  of this year. pt says that when she bled it was really heavy with clotting. pt voices no other concerns.        39 yo  presents due to irregular menses. Patient reports that her last period was  and prior to that she had a period in . Patient previously had labs in  and her TSH along with hemoglbin a1c were normal. FSH also did not indicated that the patient was in menopause. Patient was previously treated with Provera monthly to induce withdrawal bleeding by Dr. Guerrero in . Last PAP smear was normal in . Of note patient has had an essure that was placed previously.         Allergies   Allergen Reactions   • Asa [Aspirin] Shortness Of Breath and Swelling   • Cortisone Shortness Of Breath and Swelling   • Penicillins Hives and Shortness Of Breath     \"All Cillins\"   • Prednisone Shortness Of Breath and Swelling   • Zantac [Ranitidine Hcl] Shortness Of Breath and Swelling   • Ibuprofen Hives       Past Medical History:   Diagnosis Date   • Ankle swelling    • Anxiety    • Arthritis    • Back pain    • Constipation    • Depression    • Excessive thirst    • GERD (gastroesophageal reflux disease)    • Heartburn    • History of attempted suicide     age 13 years old    • Leg cramps     with walking   • Loss of bladder control leaking urine   • Pain     Shoulder, neck, knee,back     • Rash     in skin folds    • Rheumatoid arthritis (CMS/HCC)     \"starting\" was mild case per Dr. Pelaez   • Varicose veins of both lower extremities    • Wears glasses        Family History   Problem Relation Age of Onset   • Hypertension Father    • Breast cancer Mother    • Ovarian cancer Mother    • Hypertension Mother    • Diabetes " Mother    • Stroke Mother    • Coronary artery disease Mother    • Deep vein thrombosis Mother    • Obesity Mother    • Heart disease Mother    • Sleep apnea Mother    • Lung cancer Paternal Grandfather    • Hypertension Brother    • Obesity Brother    • Diabetes Brother    • Heart attack Brother    • Heart disease Brother    • Heart attack Maternal Grandmother    • Obesity Maternal Grandmother    • Obesity Brother        Social History     Social History   • Marital status:      Spouse name: N/A   • Number of children: N/A   • Years of education: N/A     Occupational History   • Not on file.     Social History Main Topics   • Smoking status: Former Smoker     Packs/day: 1.00     Years: 10.00     Types: Cigarettes     Quit date: 2010   • Smokeless tobacco: Never Used   • Alcohol use No   • Drug use: No   • Sexual activity: Yes     Partners: Male     Birth control/ protection: Surgical     Other Topics Concern   • Not on file     Social History Narrative   • No narrative on file         Current Outpatient Prescriptions:   •  DOCUSATE SODIUM PO, Take 100 mg by mouth Daily., Disp: , Rfl:   •  DULoxetine (CYMBALTA) 60 MG capsule, Take 60 mg by mouth Daily., Disp: , Rfl:   •  famotidine (PEPCID) 20 MG tablet, Take 20 mg by mouth 2 (Two) Times a Day., Disp: , Rfl:   •  ferrous sulfate 325 (65 FE) MG tablet, Take 325 mg by mouth Daily With Breakfast., Disp: , Rfl:   •  medroxyPROGESTERone (PROVERA) 10 MG tablet, Take 10 mg by mouth Daily., Disp: , Rfl:   •  meloxicam (MOBIC) 15 MG tablet, Take 15 mg by mouth Daily., Disp: , Rfl:   •  naproxen (NAPROSYN) 500 MG tablet, Take 500 mg by mouth 2 (Two) Times a Day With Meals., Disp: , Rfl:   •  pantoprazole (PROTONIX) 40 MG EC tablet, Take 1 tablet by mouth Daily., Disp: 30 tablet, Rfl: 2  •  pantoprazole (PROTONIX) 40 MG EC tablet, Take 1 tablet by mouth Daily for 14 days., Disp: 14 tablet, Rfl: 0  •  propranolol (INDERAL) 20 MG tablet, Take 20 mg by mouth As Needed.,  "Disp: , Rfl:   •  pseudoephedrine (SUDAFED) 30 MG tablet, Take 30 mg by mouth As Needed for Congestion. Sudogest, Disp: , Rfl:   •  tetracycline (ACHROMYCIN,SUMYCIN) 500 MG capsule, Take 1 capsule by mouth 4 (Four) Times a Day., Disp: 56 capsule, Rfl: 0  •  traMADol (ULTRAM) 50 MG tablet, Take 50 mg by mouth Every 6 (Six) Hours As Needed., Disp: , Rfl:   •  vitamin D (ERGOCALCIFEROL) 75461 UNITS capsule capsule, 50,000 Units Every 7 (Seven) Days., Disp: , Rfl:     No LMP recorded.    Sexual History:         Could not be calculated    Past Surgical History:   Procedure Laterality Date   • CHOLECYSTECTOMY  1999    lap   • ENDOSCOPY     • ENDOSCOPY N/A 7/31/2018    Procedure: ESOPHAGOGASTRODUODENOSCOPY WITH ANESTHESIA;  Surgeon: Julio C Rodriguez MD;  [facility] ENDOSCOPY;  Service: General   • ESSURE TUBAL LIGATION      2016 & 2017    • HERNIA REPAIR Left     inguinal; without mesh    • TEETH EXTRACTION     • TONSILLECTOMY         Review of Systems   Genitourinary: Positive for menstrual problem. Negative for breast discharge.   Neurological: Negative for headache.       Objective   Physical Exam   Constitutional: She is oriented to person, place, and time. She appears well-developed and well-nourished. No distress.   HENT:   Head: Normocephalic and atraumatic.   Eyes: EOM are normal.   Neck: Normal range of motion. No thyromegaly present.   Pulmonary/Chest: Effort normal.   Musculoskeletal: Normal range of motion.   Neurological: She is alert and oriented to person, place, and time.   Skin: Skin is warm and dry.   Psychiatric: She has a normal mood and affect. Her behavior is normal. Judgment normal.   Nursing note and vitals reviewed.        Vitals:    10/12/18 1259   BP: 124/80   Weight: 111 kg (245 lb)   Height: 157.5 cm (62\")       Cathi was seen today for amenorrhea.    Diagnoses and all orders for this visit:    Amenorrhea  -     CBC & Differential  -     Prolactin  -     Testosterone - total  -     " Hemoglobin A1c  -     DHEA-Sulfate    Other orders  -     Hemoglobin A1c  -     DHEA-Sulfate    -Labs sent with results to follow.   -Return in 2 weeks for results     Bella Garcia DO

## 2018-10-13 LAB
BASOPHILS # BLD AUTO: 0.06 10*3/MM3 (ref 0–0.2)
BASOPHILS NFR BLD AUTO: 0.6 % (ref 0–2)
DHEA-S SERPL-MCNC: 243.6 UG/DL (ref 57.3–279.2)
EOSINOPHIL # BLD AUTO: 0.25 10*3/MM3 (ref 0–0.7)
EOSINOPHIL NFR BLD AUTO: 2.4 % (ref 0–4)
ERYTHROCYTE [DISTWIDTH] IN BLOOD BY AUTOMATED COUNT: 13.9 % (ref 12–15)
HBA1C MFR BLD: 5.1 %
HCT VFR BLD AUTO: 42.1 % (ref 37–47)
HGB BLD-MCNC: 13.5 G/DL (ref 12–16)
IMM GRANULOCYTES # BLD: 0.09 10*3/MM3 (ref 0–0.03)
IMM GRANULOCYTES NFR BLD: 0.9 % (ref 0–5)
LYMPHOCYTES # BLD AUTO: 2.35 10*3/MM3 (ref 0.72–4.86)
LYMPHOCYTES NFR BLD AUTO: 22.7 % (ref 15–45)
MCH RBC QN AUTO: 26.7 PG (ref 28–32)
MCHC RBC AUTO-ENTMCNC: 32.1 G/DL (ref 33–36)
MCV RBC AUTO: 83.4 FL (ref 82–98)
MONOCYTES # BLD AUTO: 0.48 10*3/MM3 (ref 0.19–1.3)
MONOCYTES NFR BLD AUTO: 4.6 % (ref 4–12)
NEUTROPHILS # BLD AUTO: 7.11 10*3/MM3 (ref 1.87–8.4)
NEUTROPHILS NFR BLD AUTO: 68.8 % (ref 39–78)
NRBC BLD AUTO-RTO: 0 /100 WBC (ref 0–0)
PLATELET # BLD AUTO: 373 10*3/MM3 (ref 130–400)
PROLACTIN SERPL-MCNC: 27.1 NG/ML (ref 4.8–23.3)
RBC # BLD AUTO: 5.05 10*6/MM3 (ref 4.2–5.4)
TESTOST SERPL-MCNC: 51 NG/DL (ref 8–48)
WBC # BLD AUTO: 10.34 10*3/MM3 (ref 4.8–10.8)

## 2018-10-22 ENCOUNTER — OFFICE VISIT (OUTPATIENT)
Dept: PSYCHIATRY | Age: 38
End: 2018-10-22
Payer: COMMERCIAL

## 2018-10-22 VITALS
WEIGHT: 247 LBS | HEIGHT: 62 IN | BODY MASS INDEX: 45.45 KG/M2 | OXYGEN SATURATION: 97 % | DIASTOLIC BLOOD PRESSURE: 88 MMHG | HEART RATE: 89 BPM | SYSTOLIC BLOOD PRESSURE: 139 MMHG

## 2018-10-22 DIAGNOSIS — F41.9 ANXIETY: ICD-10-CM

## 2018-10-22 DIAGNOSIS — F33.1 MODERATE RECURRENT MAJOR DEPRESSION (HCC): ICD-10-CM

## 2018-10-22 PROCEDURE — 99214 OFFICE O/P EST MOD 30 MIN: CPT | Performed by: CLINICAL NURSE SPECIALIST

## 2018-10-22 RX ORDER — DULOXETIN HYDROCHLORIDE 60 MG/1
60 CAPSULE, DELAYED RELEASE ORAL 2 TIMES DAILY
Qty: 60 CAPSULE | Refills: 2 | Status: SHIPPED | OUTPATIENT
Start: 2018-10-22 | End: 2019-01-25 | Stop reason: SDUPTHER

## 2018-10-22 NOTE — PROGRESS NOTES
10/22/2018 9:09 AM   Progress Note        Manoj Rezar 1980  Psychotherapy Time Spent: 15 min      Psychotherapy Topics: health    PCP IS: Kang Dale MD      Subjective:  Patient is a 44 yo female diagnosed with Anxiety, MDD and presents today for follow-up. Last seen in clinic on 8/16 and prior records were reviewed. History obtained via chart review and patient    CHIEF COMPLAINT:    Today patient states, \"due for bariatric surgery Nov 28. \" the week after Thanksgiving. \"Nervous, figuring out what to eat, make sure I've got everything. Hasn't been walking, I baby sit 2 kids. So I walk in the house. \"     RE anxiety: \"it's doing good. I try to work on it. There's days there's ups and downs. \"  Had homecoming at her [employer], spent the day in cleaning, preparing,cleaningup and taking care of her house, Found it exhausting, but fun. Enjoys associating with the people with her [employer] many of whom are her family. Knee pain today, inside of her left leg. Back pain, shots to her back scheduled for Thursday. Doing well with current medications; less sedated now imipramine is off. Now more alert, which is good. Mood is good. She journals about her day, I  \"it seems like it is pretty good. My thoughts. Nobody else reads them. \" This technique  was identified in therapy. SUBSTANCE USE/ABUSE:   TOBACCO:  denied   ALCOHOL:  denied   MARIJUANA: denied   STREET/RECREATIONAL DRUGS: denied    DANGEROUSNESS:   SUICIDE IDEATION: denied   HOMICIDAL IDEATION/DANGEROUSNESS TO OTHERS: denied        Review of Systems - 12 point review negative except for knee pain        Current Meds:    Prior to Admission medications    Medication Sig Start Date End Date Taking?  Authorizing Provider   DULoxetine (CYMBALTA) 60 MG extended release capsule Take 2 capsules by mouth daily For mood and anxiety 8/16/18  Yes Emaline Janine, APRN - CNP   propranolol (INDERAL) 20 MG tablet Take 1 tablet by mouth 3 times daily as needed (for anxiety, for panic) 8/16/18  Yes Alfonso Abreu APRN - CNP   DOCUSATE SODIUM PO Take 100 mg by mouth daily   Yes Historical Provider, MD   vitamin D (ERGOCALCIFEROL) 23670 units CAPS capsule Take 5,000 Units by mouth once a week 6/19/18  Yes Historical Provider, MD   ferrous sulfate 325 (65 Fe) MG tablet Take 325 mg by mouth daily   Yes Historical Provider, MD   naproxen (NAPROSYN) 500 MG tablet Take 500 mg by mouth 2 times daily 6/1/18  Yes Historical Provider, MD   traMADol (ULTRAM) 50 MG tablet Take 50 mg by mouth 4 times daily. . 1/14/17  Yes Historical Provider, MD     Hasn't needed propranolol much, last use was September. It helps with calming nerves and anxiety. But is sedating. Reports compliance with medications as good . Patient reports side effects as follows: none. No evidence of EPS, no cogwheeling or abnormal motor movements. Gait and Station:normal gait and station   Musculoskeletal: WNL    MSE:  Patient is  A & O x3. Appearance:  well-appearing, in chair, good grooming and good hygiene appropriately dressed for season and age. Cognition:  Recent memory intact , remote memory intact , excellent fund of knowledge, average  intelligence level. Speech:  normal  Language:  Conversation no evidence of delusions  Behavior:  Cooperative  Mood: euthymic  Affect: congruent with mood  Thought Content: no evidence of overt psychosis, delusional thought or suicidal /homicidal ideation or plan  Thought Process: linear, goal directed and coherent  Judgement Insight:  normal and appropriate        Assesment:         Plan: continue duloxetine 60 mg po bid, propranolol 20 mg po tid prn panic/ anxiety        The risks, benefits, side effects, indications, contraindications, and adverse effects of the medications have been discussed. Yes. The pt has verbalized understanding and has capacity to give informed consent. The Sandy Morales report has been reviewed according to [facility] regulations.   Controlled

## 2018-10-29 ENCOUNTER — OFFICE VISIT (OUTPATIENT)
Dept: OBSTETRICS AND GYNECOLOGY | Facility: CLINIC | Age: 38
End: 2018-10-29

## 2018-10-29 ENCOUNTER — OFFICE VISIT (OUTPATIENT)
Dept: BARIATRICS/WEIGHT MGMT | Facility: CLINIC | Age: 38
End: 2018-10-29

## 2018-10-29 ENCOUNTER — TELEPHONE (OUTPATIENT)
Dept: BARIATRICS/WEIGHT MGMT | Facility: CLINIC | Age: 38
End: 2018-10-29

## 2018-10-29 ENCOUNTER — LAB (OUTPATIENT)
Dept: LAB | Facility: HOSPITAL | Age: 38
End: 2018-10-29
Attending: NURSE PRACTITIONER

## 2018-10-29 ENCOUNTER — PREP FOR SURGERY (OUTPATIENT)
Dept: OTHER | Facility: HOSPITAL | Age: 38
End: 2018-10-29

## 2018-10-29 ENCOUNTER — PROCEDURE VISIT (OUTPATIENT)
Dept: OBSTETRICS AND GYNECOLOGY | Facility: CLINIC | Age: 38
End: 2018-10-29

## 2018-10-29 ENCOUNTER — OFFICE VISIT (OUTPATIENT)
Dept: PSYCHIATRY | Age: 38
End: 2018-10-29
Payer: COMMERCIAL

## 2018-10-29 VITALS
BODY MASS INDEX: 45.38 KG/M2 | SYSTOLIC BLOOD PRESSURE: 137 MMHG | DIASTOLIC BLOOD PRESSURE: 87 MMHG | WEIGHT: 246.6 LBS | TEMPERATURE: 98.4 F | HEIGHT: 62 IN | OXYGEN SATURATION: 98 % | HEART RATE: 90 BPM

## 2018-10-29 VITALS
WEIGHT: 241 LBS | DIASTOLIC BLOOD PRESSURE: 74 MMHG | SYSTOLIC BLOOD PRESSURE: 110 MMHG | BODY MASS INDEX: 44.35 KG/M2 | HEIGHT: 62 IN

## 2018-10-29 DIAGNOSIS — F41.1 GAD (GENERALIZED ANXIETY DISORDER): Primary | ICD-10-CM

## 2018-10-29 DIAGNOSIS — N91.2 AMENORRHEA: Primary | ICD-10-CM

## 2018-10-29 DIAGNOSIS — E66.01 OBESITY, CLASS III, BMI 40-49.9 (MORBID OBESITY) (HCC): Primary | ICD-10-CM

## 2018-10-29 DIAGNOSIS — K21.9 GASTROESOPHAGEAL REFLUX DISEASE, ESOPHAGITIS PRESENCE NOT SPECIFIED: ICD-10-CM

## 2018-10-29 DIAGNOSIS — A04.8 H. PYLORI INFECTION: ICD-10-CM

## 2018-10-29 DIAGNOSIS — R46.81 OBSESSIVE-COMPULSIVE SYMPTOMS: ICD-10-CM

## 2018-10-29 LAB
B-HCG UR QL: NEGATIVE
INTERNAL NEGATIVE CONTROL: NEGATIVE
INTERNAL POSITIVE CONTROL: POSITIVE
Lab: NORMAL

## 2018-10-29 PROCEDURE — 87338 HPYLORI STOOL AG IA: CPT | Performed by: NURSE PRACTITIONER

## 2018-10-29 PROCEDURE — 90832 PSYTX W PT 30 MINUTES: CPT | Performed by: COUNSELOR

## 2018-10-29 PROCEDURE — 99213 OFFICE O/P EST LOW 20 MIN: CPT | Performed by: OBSTETRICS & GYNECOLOGY

## 2018-10-29 PROCEDURE — 76830 TRANSVAGINAL US NON-OB: CPT | Performed by: OBSTETRICS & GYNECOLOGY

## 2018-10-29 PROCEDURE — 99213 OFFICE O/P EST LOW 20 MIN: CPT | Performed by: SURGERY

## 2018-10-29 RX ORDER — SCOLOPAMINE TRANSDERMAL SYSTEM 1 MG/1
1 PATCH, EXTENDED RELEASE TRANSDERMAL ONCE
Status: CANCELLED | OUTPATIENT
Start: 2018-10-29 | End: 2018-10-29

## 2018-10-29 RX ORDER — CLINDAMYCIN PHOSPHATE 900 MG/50ML
900 INJECTION INTRAVENOUS ONCE
Status: CANCELLED | OUTPATIENT
Start: 2018-10-29 | End: 2018-10-29

## 2018-10-29 RX ORDER — GABAPENTIN 250 MG/5ML
250 SOLUTION ORAL ONCE
Status: CANCELLED | OUTPATIENT
Start: 2018-10-29 | End: 2018-10-29

## 2018-10-29 NOTE — TELEPHONE ENCOUNTER
[facility] H.Pylori Outstanding Lab     Called left message   RE: H.Pylori outstanding lab needing to be done prior to submitting for surgery.

## 2018-10-29 NOTE — PROGRESS NOTES
"   Patient Care Team:  Shlomo Lira MD as PCP - General (Family Medicine)    Reason for Visit:  Surgical Weight loss    Subjective     Patient is a 38 y.o. female presents with morbid obesity and her Body mass index is 45.1 kg/m².     She is here for discussion of surgical weight loss options.  She stated she has been with the disease of obesity for year(s).  She stated she suffers from back pain, reflux, and morbid obesity due to her weight gain.  She stated that weight loss helps alleviate these symptoms.   She stated that she has tried multiple diet regimens including completing a medically supervised weight loss program to help with weight loss.  She stated that she has attempted these conservative methods for weight loss without maintaining long term success.  Today she would like to discuss surgical weight loss options such as the Laparoscopic Sleeve Gastrectomy or the Laparoscopic R - Y Gastric Bypass.     Review of Systems  General ROS: negative  Respiratory ROS: no cough, shortness of breath, or wheezing  Cardiovascular ROS: no chest pain or dyspnea on exertion  Gastrointestinal ROS: no abdominal pain, change in bowel habits, or black or bloody stools  positive for - heartburn    History  Past Medical History:   Diagnosis Date   • Ankle swelling    • Anxiety    • Arthritis    • Back pain    • Constipation    • Depression    • Excessive thirst    • GERD (gastroesophageal reflux disease)    • Heartburn    • History of attempted suicide     age 13 years old    • Leg cramps     with walking   • Loss of bladder control leaking urine   • Pain     Shoulder, neck, knee,back     • Rash     in skin folds    • Rheumatoid arthritis (CMS/HCC)     \"starting\" was mild case per Dr. Pelaez   • Varicose veins of both lower extremities    • Wears glasses      Past Surgical History:   Procedure Laterality Date   • CHOLECYSTECTOMY  1999    lap   • ENDOSCOPY     • ENDOSCOPY N/A 7/31/2018    Procedure: " ESOPHAGOGASTRODUODENOSCOPY WITH ANESTHESIA;  Surgeon: Julio C Rodriguez MD;  Location: [facility] ENDOSCOPY;  Service: General   • ESSURE TUBAL LIGATION      2016 & 2017    • HERNIA REPAIR Left     inguinal; without mesh    • TEETH EXTRACTION     • TONSILLECTOMY       Family History   Problem Relation Age of Onset   • Hypertension Father    • Breast cancer Mother    • Ovarian cancer Mother    • Hypertension Mother    • Diabetes Mother    • Stroke Mother    • Coronary artery disease Mother    • Deep vein thrombosis Mother    • Obesity Mother    • Heart disease Mother    • Sleep apnea Mother    • Lung cancer Paternal Grandfather    • Hypertension Brother    • Obesity Brother    • Diabetes Brother    • Heart attack Brother    • Heart disease Brother    • Heart attack Maternal Grandmother    • Obesity Maternal Grandmother    • Obesity Brother      Social History   Substance Use Topics   • Smoking status: Former Smoker     Packs/day: 1.00     Years: 10.00     Types: Cigarettes     Quit date: 2010   • Smokeless tobacco: Never Used   • Alcohol use No       (Not in a hospital admission)  Allergies:  Asa [aspirin]; Cortisone; Penicillins; Prednisone; Zantac [ranitidine hcl]; and Ibuprofen      Current Outpatient Prescriptions:   •  DOCUSATE SODIUM PO, Take 100 mg by mouth Daily., Disp: , Rfl:   •  DULoxetine (CYMBALTA) 60 MG capsule, Take 60 mg by mouth Daily., Disp: , Rfl:   •  ferrous sulfate 325 (65 FE) MG tablet, Take 325 mg by mouth Daily With Breakfast., Disp: , Rfl:   •  medroxyPROGESTERone (PROVERA) 10 MG tablet, Take 10 mg by mouth Daily., Disp: , Rfl:   •  pantoprazole (PROTONIX) 40 MG EC tablet, Take 1 tablet by mouth Daily., Disp: 30 tablet, Rfl: 2  •  propranolol (INDERAL) 20 MG tablet, Take 20 mg by mouth As Needed., Disp: , Rfl:   •  pseudoephedrine (SUDAFED) 30 MG tablet, Take 30 mg by mouth As Needed for Congestion. Sudogest, Disp: , Rfl:   •  traMADol (ULTRAM) 50 MG tablet, Take 50 mg by mouth Every 6 (Six)  Hours As Needed., Disp: , Rfl:   •  vitamin D (ERGOCALCIFEROL) 70212 UNITS capsule capsule, 50,000 Units Every 7 (Seven) Days., Disp: , Rfl:     Objective     Vital Signs  Temp:  [98.4 °F (36.9 °C)] 98.4 °F (36.9 °C)  Heart Rate:  [90] 90  BP: (110-137)/(74-87) 137/87  Body mass index is 45.1 kg/m².  1    10/29/18  1020   Weight: 112 kg (246 lb 9.6 oz)       Physical Exam:      HEENT: extra ocular movement intact  Respiratory: appears well, vitals normal, no respiratory distress, acyanotic, normal RR, chest clear, no wheezing, crepitations, rhonchi, normal symmetric air entry  Cardiovascular: Regular rate and rhythm, S1, S2 normal, no murmur, click, rub or gallop  GI: Soft, non-tender, normal bowel sounds; no bruits, organomegaly or masses.  Abnormal shape: obese  Musculoskeletal: inspection - no abnormality  Neurologic: alert, oriented, normal speech, no focal findings or movement disorder noted       Results Review:   I reviewed the patient's new clinical results.        Assessment/Plan   Encounter Diagnoses   Name Primary?   • Obesity, Class III, BMI 40-49.9 (morbid obesity) (CMS/HCC) Yes   • Gastroesophageal reflux disease, esophagitis presence not specified        I believe this patient will be a good candidate for weight loss surgery.    She has chosen laparoscopic sleeve gastrectomy. I agree with this decision.  I have discussed the Arnaldo - Y Gastric Bypass, laparoscopic sleeve gastrectomy and the Laparoscopic Gastric Band procedures to provide the alternatives which includes non surgical weight loss options as well.  We discussed the benefits of the surgeries including the benefit of weight loss and the possible reversal of co-morbid conditions associated with morbid obesity.  She is aware that the Sleeve procedure is not reversible.  We discussed the complications and risks which include the risk of perforation, leakage,bleeding, intra-abdominal organ injury, stenosis or ulcerations, the risk of venous  thrombosis formation in the lungs, mesentery veins or lower extremities  leading to possible organ injury and death.  I explained to her the possibility that this procedure may not be performed laparoscopically and may require being converted to an opened procedure or aborted due to abnormal anatomy.  Also postoperatively there is a risk of increased GERD symptoms after this procedure.  I have explained if she develops intestinal metaplasia of her esophagus consideration for conversion to another weight loss procedure may be necessary.  Upon completion of our discussion and addressing and answering her questions to her satisfaction, informed consent was obtained.   She will be scheduled accordingly for a laparoscopic Sleeve gastrectomy procedure.      I discussed the patient's findings and my recommendations with patient.     Dr. Julio C Rodriguez MD Located within [facility]    10/29/18  1:13 PM  Patient Care Team:  Shlomo Lira MD as PCP - General (Family Medicine)

## 2018-10-30 RX ORDER — NORETHINDRONE ACETATE AND ETHINYL ESTRADIOL 1MG-20(21)
1 KIT ORAL DAILY
Qty: 28 TABLET | Refills: 3 | Status: SHIPPED | OUTPATIENT
Start: 2018-10-30 | End: 2019-10-30

## 2018-10-30 NOTE — PROGRESS NOTES
"Subjective   Cathi Krishnan is a 38 y.o. female  YOB: 1980    Chief Complaint   Patient presents with   • Amenorrhea     Here for follow up for amenorrhea. Had U/S today.  Go over blood work.        39 yo  Patient's last menstrual period was 2018 presents for follow up examination. Patient reports that her last period was  and prior to that she had a period in . Patient previously had labs in  and her TSH along with hemoglbin a1c were normal. FSH also did not indicated that the patient was in menopause. Patient was previously treated with Provera monthly to induce withdrawal bleeding by Dr. Guerrero in . Last PAP smear was normal in . Of note patient has had an essure that was placed previously. Denies history of migraine with aura. Denies history of blood clots or bleeding disorders.         Allergies   Allergen Reactions   • Asa [Aspirin] Shortness Of Breath and Swelling   • Cortisone Shortness Of Breath and Swelling   • Penicillins Hives and Shortness Of Breath     \"All Cillins\"   • Prednisone Shortness Of Breath and Swelling   • Zantac [Ranitidine Hcl] Shortness Of Breath and Swelling   • Ibuprofen Hives       Past Medical History:   Diagnosis Date   • Ankle swelling    • Anxiety    • Arthritis    • Back pain    • Constipation    • Depression    • Excessive thirst    • GERD (gastroesophageal reflux disease)    • Heartburn    • History of attempted suicide     age 13 years old    • Leg cramps     with walking   • Loss of bladder control leaking urine   • Pain     Shoulder, neck, knee,back     • Rash     in skin folds    • Rheumatoid arthritis (CMS/HCC)     \"starting\" was mild case per Dr. ePlaez   • Varicose veins of both lower extremities    • Wears glasses        Family History   Problem Relation Age of Onset   • Hypertension Father    • Breast cancer Mother    • Ovarian cancer Mother    • Hypertension Mother    • Diabetes Mother    • Stroke Mother    • " Coronary artery disease Mother    • Deep vein thrombosis Mother    • Obesity Mother    • Heart disease Mother    • Sleep apnea Mother    • Lung cancer Paternal Grandfather    • Hypertension Brother    • Obesity Brother    • Diabetes Brother    • Heart attack Brother    • Heart disease Brother    • Heart attack Maternal Grandmother    • Obesity Maternal Grandmother    • Obesity Brother        Social History     Social History   • Marital status:      Spouse name: N/A   • Number of children: N/A   • Years of education: N/A     Occupational History   • Not on file.     Social History Main Topics   • Smoking status: Former Smoker     Packs/day: 1.00     Years: 10.00     Types: Cigarettes     Quit date: 2010   • Smokeless tobacco: Never Used   • Alcohol use No   • Drug use: No   • Sexual activity: Yes     Partners: Male     Birth control/ protection: Essure     Other Topics Concern   • Not on file     Social History Narrative   • No narrative on file         Current Outpatient Prescriptions:   •  DOCUSATE SODIUM PO, Take 100 mg by mouth Daily., Disp: , Rfl:   •  DULoxetine (CYMBALTA) 60 MG capsule, Take 60 mg by mouth Daily., Disp: , Rfl:   •  ferrous sulfate 325 (65 FE) MG tablet, Take 325 mg by mouth Daily With Breakfast., Disp: , Rfl:   •  pantoprazole (PROTONIX) 40 MG EC tablet, Take 1 tablet by mouth Daily., Disp: 30 tablet, Rfl: 2  •  propranolol (INDERAL) 20 MG tablet, Take 20 mg by mouth As Needed., Disp: , Rfl:   •  pseudoephedrine (SUDAFED) 30 MG tablet, Take 30 mg by mouth As Needed for Congestion. Sudogest, Disp: , Rfl:   •  traMADol (ULTRAM) 50 MG tablet, Take 50 mg by mouth Every 6 (Six) Hours As Needed., Disp: , Rfl:   •  vitamin D (ERGOCALCIFEROL) 73627 UNITS capsule capsule, 50,000 Units Every 7 (Seven) Days., Disp: , Rfl:   •  norethindrone-ethinyl estradiol FE (MICROGESTIN FE 1/20) 1-20 MG-MCG per tablet, Take 1 tablet by mouth Daily., Disp: 28 tablet, Rfl: 3    Patient's last menstrual period  "was 09/17/2018.    Sexual History:         Could not be calculated    Past Surgical History:   Procedure Laterality Date   • CHOLECYSTECTOMY  1999    lap   • ENDOSCOPY     • ENDOSCOPY N/A 7/31/2018    Procedure: ESOPHAGOGASTRODUODENOSCOPY WITH ANESTHESIA;  Surgeon: Julio C Rodriguez MD;  Location: [facility] ENDOSCOPY;  Service: General   • ESSURE TUBAL LIGATION      2016 & 2017    • HERNIA REPAIR Left     inguinal; without mesh    • TEETH EXTRACTION     • TONSILLECTOMY         Review of Systems    Objective   Physical Exam   Constitutional: She is oriented to person, place, and time. She appears well-developed and well-nourished. No distress.   HENT:   Head: Normocephalic and atraumatic.   Eyes: EOM are normal.   Neck: Normal range of motion.   Pulmonary/Chest: Effort normal.   Musculoskeletal: Normal range of motion.   Neurological: She is alert and oriented to person, place, and time.   Skin: Skin is warm and dry.   Psychiatric: She has a normal mood and affect. Her behavior is normal. Judgment normal.   Nursing note and vitals reviewed.        Vitals:    10/29/18 0808   BP: 110/74   BP Location: Left arm   Patient Position: Sitting   Cuff Size: Adult   Weight: 109 kg (241 lb)   Height: 157.5 cm (62\")       Cathi was seen today for amenorrhea.    Diagnoses and all orders for this visit:    Amenorrhea  -     POC Pregnancy, Urine    Other orders  -     norethindrone-ethinyl estradiol FE (MICROGESTIN FE 1/20) 1-20 MG-MCG per tablet; Take 1 tablet by mouth Daily.    -Patient placed on Microgestin for three months   -Discussed increased risk of blood clots  -All questions answered   -Patient to return to clinic for recheck in 3 months.     Bella Garcia, DO       "

## 2018-11-01 ENCOUNTER — TELEPHONE (OUTPATIENT)
Dept: BARIATRICS/WEIGHT MGMT | Facility: CLINIC | Age: 38
End: 2018-11-01

## 2018-11-01 PROBLEM — A04.8 H. PYLORI INFECTION: Status: ACTIVE | Noted: 2018-11-01

## 2018-11-01 LAB — H PYLORI AG STL QL IA: POSITIVE

## 2018-11-01 RX ORDER — METRONIDAZOLE 500 MG/1
500 TABLET ORAL EVERY 8 HOURS SCHEDULED
Qty: 30 TABLET | Refills: 0 | Status: SHIPPED | OUTPATIENT
Start: 2018-11-01 | End: 2018-11-11

## 2018-11-01 RX ORDER — PANTOPRAZOLE SODIUM 40 MG/1
40 TABLET, DELAYED RELEASE ORAL DAILY
Qty: 30 TABLET | Refills: 1 | Status: SHIPPED | OUTPATIENT
Start: 2018-11-01 | End: 2019-05-03 | Stop reason: SDUPTHER

## 2018-11-01 RX ORDER — CLARITHROMYCIN 500 MG/1
500 TABLET, COATED ORAL 2 TIMES DAILY
Qty: 20 TABLET | Refills: 0 | Status: ON HOLD | OUTPATIENT
Start: 2018-11-01 | End: 2018-11-28

## 2018-11-01 NOTE — TELEPHONE ENCOUNTER
H. Pylori Lab & Medication     Per Dr Rodriguez  He sent in medication patient is to  and start today.  An order has been put in the computer system for another H.pylori test in two weeks.      Oked per patient     Lisa the Coordinator will contact her once her Bariatric Surgery has been scheduled and approved.

## 2018-11-05 ENCOUNTER — TELEPHONE (OUTPATIENT)
Dept: BARIATRICS/WEIGHT MGMT | Facility: CLINIC | Age: 38
End: 2018-11-05

## 2018-11-05 NOTE — TELEPHONE ENCOUNTER
Patient has been taking Flagyl and said that it is giving her a metallic taste and making her sick to her stomach. What should she do? Would like a call back at [phone].

## 2018-11-16 ENCOUNTER — TELEPHONE (OUTPATIENT)
Dept: PSYCHIATRY | Age: 38
End: 2018-11-16

## 2018-11-16 ENCOUNTER — LAB (OUTPATIENT)
Dept: LAB | Facility: HOSPITAL | Age: 38
End: 2018-11-16
Attending: SURGERY

## 2018-11-16 ENCOUNTER — TREATMENT (OUTPATIENT)
Dept: BARIATRICS/WEIGHT MGMT | Facility: CLINIC | Age: 38
End: 2018-11-16

## 2018-11-16 ENCOUNTER — APPOINTMENT (OUTPATIENT)
Dept: PREADMISSION TESTING | Facility: HOSPITAL | Age: 38
End: 2018-11-16

## 2018-11-16 VITALS — BODY MASS INDEX: 44.61 KG/M2 | WEIGHT: 242.4 LBS | HEIGHT: 62 IN

## 2018-11-16 DIAGNOSIS — E66.01 OBESITY, CLASS III, BMI 40-49.9 (MORBID OBESITY) (HCC): ICD-10-CM

## 2018-11-16 DIAGNOSIS — K21.9 GASTROESOPHAGEAL REFLUX DISEASE, ESOPHAGITIS PRESENCE NOT SPECIFIED: ICD-10-CM

## 2018-11-16 LAB
ALBUMIN SERPL-MCNC: 4.5 G/DL (ref 3.5–5)
ALBUMIN/GLOB SERPL: 1.3 G/DL (ref 1.1–2.5)
ALP SERPL-CCNC: 82 U/L (ref 24–120)
ALT SERPL W P-5'-P-CCNC: 21 U/L (ref 0–54)
ANION GAP SERPL CALCULATED.3IONS-SCNC: 11 MMOL/L (ref 4–13)
AST SERPL-CCNC: 27 U/L (ref 7–45)
BILIRUB SERPL-MCNC: 0.5 MG/DL (ref 0.1–1)
BUN BLD-MCNC: 15 MG/DL (ref 5–21)
BUN/CREAT SERPL: 22.7 (ref 7–25)
CALCIUM SPEC-SCNC: 9.8 MG/DL (ref 8.4–10.4)
CHLORIDE SERPL-SCNC: 99 MMOL/L (ref 98–110)
CO2 SERPL-SCNC: 30 MMOL/L (ref 24–31)
CREAT BLD-MCNC: 0.66 MG/DL (ref 0.5–1.4)
DEPRECATED RDW RBC AUTO: 40.7 FL (ref 40–54)
ERYTHROCYTE [DISTWIDTH] IN BLOOD BY AUTOMATED COUNT: 13.6 % (ref 12–15)
GFR SERPL CREATININE-BSD FRML MDRD: 100 ML/MIN/1.73
GLOBULIN UR ELPH-MCNC: 3.4 GM/DL
GLUCOSE BLD-MCNC: 82 MG/DL (ref 70–100)
HBA1C MFR BLD: 5 %
HCT VFR BLD AUTO: 43.5 % (ref 37–47)
HGB BLD-MCNC: 14.4 G/DL (ref 12–16)
MCH RBC QN AUTO: 27.3 PG (ref 28–32)
MCHC RBC AUTO-ENTMCNC: 33.1 G/DL (ref 33–36)
MCV RBC AUTO: 82.5 FL (ref 82–98)
PLATELET # BLD AUTO: 361 10*3/MM3 (ref 130–400)
PMV BLD AUTO: 9.9 FL (ref 6–12)
POTASSIUM BLD-SCNC: 3.9 MMOL/L (ref 3.5–5.3)
PROT SERPL-MCNC: 7.9 G/DL (ref 6.3–8.7)
RBC # BLD AUTO: 5.27 10*6/MM3 (ref 4.2–5.4)
SODIUM BLD-SCNC: 140 MMOL/L (ref 135–145)
WBC NRBC COR # BLD: 10.13 10*3/MM3 (ref 4.8–10.8)

## 2018-11-16 PROCEDURE — 87338 HPYLORI STOOL AG IA: CPT | Performed by: SURGERY

## 2018-11-16 PROCEDURE — 85027 COMPLETE CBC AUTOMATED: CPT | Performed by: SURGERY

## 2018-11-16 PROCEDURE — 36415 COLL VENOUS BLD VENIPUNCTURE: CPT | Performed by: OBSTETRICS & GYNECOLOGY

## 2018-11-16 PROCEDURE — 83036 HEMOGLOBIN GLYCOSYLATED A1C: CPT | Performed by: OBSTETRICS & GYNECOLOGY

## 2018-11-16 PROCEDURE — 80053 COMPREHEN METABOLIC PANEL: CPT | Performed by: SURGERY

## 2018-11-16 PROCEDURE — 82627 DEHYDROEPIANDROSTERONE: CPT | Performed by: OBSTETRICS & GYNECOLOGY

## 2018-11-16 NOTE — PROGRESS NOTES
Date: 11-16-18      [facility]   Information and Education Class for Pre and Post     Surgery Care, Diets and Daily Living.       Surgery Type: Sleeve Gastrectomy Consent on File    Weight: 242.4lb        Diet Stages Form given including diet stages and surgery dates/times   Picture taken & Picture Consent on File  Weight Loss Surgery Patient Contract on File      Patient has signed/agreed with the Diet Stage & Medication discontinue sheet:       1) Medications have been review by our office and there are no other Medications except for the following that this patient will need to discontinue prior to Bariatric Surgery.      2) Patient informed to stop NSAID one week prior to surgery.  Also do not take birth control one week prior to surgery and she should stop iron and sudafed the morning of surgery.        3) Dr Rodriguez recommends that this patient take 2 extra strength Tylenol (1000mg) along with 8 ounces of Sugar Free Gatorade, G2 or Powerade Zero (no red or pink in color) the morning of surgery.      4) During Boot camp-Patient also met with Outpatient Surgery Staff regarding Their other medications & prep for surgery.      Patient also received [facility] Surgery Post Follow up Appointments 1 week-1 year.     [unfilled]  11:14 AM

## 2018-11-16 NOTE — PAT
SURGICAL CONSENT SIGNED.  PATIENT TEACHING DONE WITH HANDOUTS, CHG WASH AND INSTRUCTIONS GIVEN.  INSTRUCTED ON INCENTIVE SPIROMETER.  PATIENT VERBALIZED UNDERSTANDING.

## 2018-11-17 LAB — DHEA-S SERPL-MCNC: 373.3 UG/DL (ref 57.3–279.2)

## 2018-11-19 LAB — H PYLORI AG STL QL IA: NEGATIVE

## 2018-11-27 ENCOUNTER — ANESTHESIA EVENT (OUTPATIENT)
Dept: PERIOP | Facility: HOSPITAL | Age: 38
End: 2018-11-27

## 2018-11-28 ENCOUNTER — ANESTHESIA (OUTPATIENT)
Dept: PERIOP | Facility: HOSPITAL | Age: 38
End: 2018-11-28

## 2018-11-28 ENCOUNTER — HOSPITAL ENCOUNTER (INPATIENT)
Facility: HOSPITAL | Age: 38
LOS: 1 days | Discharge: HOME | End: 2018-11-29
Attending: SURGERY | Admitting: SURGERY

## 2018-11-28 DIAGNOSIS — Z98.84 STATUS POST LAPAROSCOPIC SLEEVE GASTRECTOMY: Primary | ICD-10-CM

## 2018-11-28 DIAGNOSIS — K21.9 GASTROESOPHAGEAL REFLUX DISEASE, ESOPHAGITIS PRESENCE NOT SPECIFIED: ICD-10-CM

## 2018-11-28 DIAGNOSIS — E66.01 OBESITY, CLASS III, BMI 40-49.9 (MORBID OBESITY) (HCC): ICD-10-CM

## 2018-11-28 LAB
ABO GROUP BLD: NORMAL
B-HCG UR QL: NEGATIVE
BLD GP AB SCN SERPL QL: NEGATIVE
RH BLD: POSITIVE
T&S EXPIRATION DATE: NORMAL

## 2018-11-28 PROCEDURE — 25010000002 KETOROLAC TROMETHAMINE PER 15 MG: Performed by: NURSE ANESTHETIST, CERTIFIED REGISTERED

## 2018-11-28 PROCEDURE — 63710000001 APREPITANT PER 5 MG: Performed by: SURGERY

## 2018-11-28 PROCEDURE — 25010000002 ONDANSETRON PER 1 MG: Performed by: NURSE ANESTHETIST, CERTIFIED REGISTERED

## 2018-11-28 PROCEDURE — 86900 BLOOD TYPING SEROLOGIC ABO: CPT | Performed by: SURGERY

## 2018-11-28 PROCEDURE — 94799 UNLISTED PULMONARY SVC/PX: CPT

## 2018-11-28 PROCEDURE — 86850 RBC ANTIBODY SCREEN: CPT | Performed by: SURGERY

## 2018-11-28 PROCEDURE — G0378 HOSPITAL OBSERVATION PER HR: HCPCS

## 2018-11-28 PROCEDURE — 86901 BLOOD TYPING SEROLOGIC RH(D): CPT | Performed by: SURGERY

## 2018-11-28 PROCEDURE — 25010000002 NEOSTIGMINE PER 0.5 MG: Performed by: NURSE ANESTHETIST, CERTIFIED REGISTERED

## 2018-11-28 PROCEDURE — 25010000002 FENTANYL CITRATE (PF) 250 MCG/5ML SOLUTION: Performed by: NURSE ANESTHETIST, CERTIFIED REGISTERED

## 2018-11-28 PROCEDURE — 25010000002 ENOXAPARIN PER 10 MG: Performed by: SURGERY

## 2018-11-28 PROCEDURE — 0DB64Z3 EXCISION OF STOMACH, PERCUTANEOUS ENDOSCOPIC APPROACH, VERTICAL: ICD-10-PCS | Performed by: SURGERY

## 2018-11-28 PROCEDURE — 25010000002 MIDAZOLAM PER 1 MG: Performed by: NURSE ANESTHETIST, CERTIFIED REGISTERED

## 2018-11-28 PROCEDURE — 94760 N-INVAS EAR/PLS OXIMETRY 1: CPT

## 2018-11-28 PROCEDURE — 43775 LAP SLEEVE GASTRECTOMY: CPT | Performed by: SURGERY

## 2018-11-28 PROCEDURE — 88307 TISSUE EXAM BY PATHOLOGIST: CPT | Performed by: SURGERY

## 2018-11-28 PROCEDURE — 25010000002 SUCCINYLCHOLINE PER 20 MG: Performed by: NURSE ANESTHETIST, CERTIFIED REGISTERED

## 2018-11-28 PROCEDURE — 25010000002 ONDANSETRON PER 1 MG: Performed by: SURGERY

## 2018-11-28 PROCEDURE — 81025 URINE PREGNANCY TEST: CPT | Performed by: SURGERY

## 2018-11-28 PROCEDURE — 25010000002 PROPOFOL 10 MG/ML EMULSION: Performed by: NURSE ANESTHETIST, CERTIFIED REGISTERED

## 2018-11-28 PROCEDURE — 25010000002 METOCLOPRAMIDE PER 10 MG: Performed by: SURGERY

## 2018-11-28 DEVICE — MESH STPL LN SEAMGUARD FLX60 BIOABS WHT/BLU/GRN/GLD/BLK: Type: IMPLANTABLE DEVICE | Site: STOMACH | Status: FUNCTIONAL

## 2018-11-28 RX ORDER — GLYCOPYRROLATE 0.2 MG/ML
INJECTION INTRAMUSCULAR; INTRAVENOUS AS NEEDED
Status: DISCONTINUED | OUTPATIENT
Start: 2018-11-28 | End: 2018-11-28 | Stop reason: SURG

## 2018-11-28 RX ORDER — IPRATROPIUM BROMIDE AND ALBUTEROL SULFATE 2.5; .5 MG/3ML; MG/3ML
3 SOLUTION RESPIRATORY (INHALATION) ONCE AS NEEDED
Status: DISCONTINUED | OUTPATIENT
Start: 2018-11-28 | End: 2018-11-28 | Stop reason: HOSPADM

## 2018-11-28 RX ORDER — CLINDAMYCIN PHOSPHATE 900 MG/50ML
900 INJECTION INTRAVENOUS ONCE
Status: DISCONTINUED | OUTPATIENT
Start: 2018-11-28 | End: 2018-11-28

## 2018-11-28 RX ORDER — MIDAZOLAM HYDROCHLORIDE 1 MG/ML
2 INJECTION INTRAMUSCULAR; INTRAVENOUS
Status: DISCONTINUED | OUTPATIENT
Start: 2018-11-28 | End: 2018-11-28 | Stop reason: HOSPADM

## 2018-11-28 RX ORDER — SIMETHICONE 80 MG
40 TABLET,CHEWABLE ORAL 4 TIMES DAILY PRN
Status: DISCONTINUED | OUTPATIENT
Start: 2018-11-28 | End: 2018-11-29 | Stop reason: HOSPADM

## 2018-11-28 RX ORDER — LIDOCAINE HYDROCHLORIDE 20 MG/ML
INJECTION, SOLUTION INFILTRATION; PERINEURAL AS NEEDED
Status: DISCONTINUED | OUTPATIENT
Start: 2018-11-28 | End: 2018-11-28 | Stop reason: SURG

## 2018-11-28 RX ORDER — LABETALOL HYDROCHLORIDE 5 MG/ML
5 INJECTION, SOLUTION INTRAVENOUS
Status: DISCONTINUED | OUTPATIENT
Start: 2018-11-28 | End: 2018-11-28 | Stop reason: HOSPADM

## 2018-11-28 RX ORDER — BUPIVACAINE HCL/0.9 % NACL/PF 0.1 %
2 PLASTIC BAG, INJECTION (ML) EPIDURAL EVERY 8 HOURS
Status: DISCONTINUED | OUTPATIENT
Start: 2018-11-28 | End: 2018-11-28

## 2018-11-28 RX ORDER — PROMETHAZINE HYDROCHLORIDE 25 MG/ML
12.5 INJECTION, SOLUTION INTRAMUSCULAR; INTRAVENOUS EVERY 6 HOURS PRN
Status: DISCONTINUED | OUTPATIENT
Start: 2018-11-28 | End: 2018-11-29 | Stop reason: HOSPADM

## 2018-11-28 RX ORDER — FAMOTIDINE 10 MG/ML
20 INJECTION, SOLUTION INTRAVENOUS EVERY 12 HOURS SCHEDULED
Status: DISCONTINUED | OUTPATIENT
Start: 2018-11-28 | End: 2018-11-29 | Stop reason: HOSPADM

## 2018-11-28 RX ORDER — SODIUM CHLORIDE 0.9 % (FLUSH) 0.9 %
3-10 SYRINGE (ML) INJECTION AS NEEDED
Status: DISCONTINUED | OUTPATIENT
Start: 2018-11-28 | End: 2018-11-28 | Stop reason: HOSPADM

## 2018-11-28 RX ORDER — SODIUM CHLORIDE 9 MG/ML
INJECTION, SOLUTION INTRAVENOUS AS NEEDED
Status: DISCONTINUED | OUTPATIENT
Start: 2018-11-28 | End: 2018-11-28 | Stop reason: HOSPADM

## 2018-11-28 RX ORDER — SODIUM CHLORIDE, SODIUM LACTATE, POTASSIUM CHLORIDE, CALCIUM CHLORIDE 600; 310; 30; 20 MG/100ML; MG/100ML; MG/100ML; MG/100ML
1000 INJECTION, SOLUTION INTRAVENOUS CONTINUOUS
Status: DISCONTINUED | OUTPATIENT
Start: 2018-11-28 | End: 2018-11-28 | Stop reason: HOSPADM

## 2018-11-28 RX ORDER — APREPITANT 80 MG/1
80 CAPSULE ORAL DAILY
Status: DISCONTINUED | OUTPATIENT
Start: 2018-11-28 | End: 2018-11-28 | Stop reason: HOSPADM

## 2018-11-28 RX ORDER — SODIUM CHLORIDE, SODIUM LACTATE, POTASSIUM CHLORIDE, CALCIUM CHLORIDE 600; 310; 30; 20 MG/100ML; MG/100ML; MG/100ML; MG/100ML
100 INJECTION, SOLUTION INTRAVENOUS CONTINUOUS
Status: DISCONTINUED | OUTPATIENT
Start: 2018-11-28 | End: 2018-11-28 | Stop reason: HOSPADM

## 2018-11-28 RX ORDER — LIDOCAINE HYDROCHLORIDE 40 MG/ML
SOLUTION TOPICAL AS NEEDED
Status: DISCONTINUED | OUTPATIENT
Start: 2018-11-28 | End: 2018-11-28 | Stop reason: SURG

## 2018-11-28 RX ORDER — CLINDAMYCIN PHOSPHATE 900 MG/50ML
900 INJECTION INTRAVENOUS EVERY 8 HOURS
Status: COMPLETED | OUTPATIENT
Start: 2018-11-28 | End: 2018-11-29

## 2018-11-28 RX ORDER — SODIUM CHLORIDE 0.9 % (FLUSH) 0.9 %
1-10 SYRINGE (ML) INJECTION AS NEEDED
Status: DISCONTINUED | OUTPATIENT
Start: 2018-11-28 | End: 2018-11-29 | Stop reason: HOSPADM

## 2018-11-28 RX ORDER — SCOLOPAMINE TRANSDERMAL SYSTEM 1 MG/1
1 PATCH, EXTENDED RELEASE TRANSDERMAL ONCE
Status: DISCONTINUED | OUTPATIENT
Start: 2018-11-28 | End: 2018-11-28

## 2018-11-28 RX ORDER — NALOXONE HCL 0.4 MG/ML
0.04 VIAL (ML) INJECTION AS NEEDED
Status: DISCONTINUED | OUTPATIENT
Start: 2018-11-28 | End: 2018-11-28 | Stop reason: HOSPADM

## 2018-11-28 RX ORDER — PROPOFOL 10 MG/ML
VIAL (ML) INTRAVENOUS AS NEEDED
Status: DISCONTINUED | OUTPATIENT
Start: 2018-11-28 | End: 2018-11-28 | Stop reason: SURG

## 2018-11-28 RX ORDER — MEPERIDINE HYDROCHLORIDE 25 MG/ML
12.5 INJECTION INTRAMUSCULAR; INTRAVENOUS; SUBCUTANEOUS
Status: DISCONTINUED | OUTPATIENT
Start: 2018-11-28 | End: 2018-11-28 | Stop reason: HOSPADM

## 2018-11-28 RX ORDER — METOCLOPRAMIDE HYDROCHLORIDE 5 MG/ML
5 INJECTION INTRAMUSCULAR; INTRAVENOUS EVERY 6 HOURS
Status: DISCONTINUED | OUTPATIENT
Start: 2018-11-28 | End: 2018-11-29 | Stop reason: HOSPADM

## 2018-11-28 RX ORDER — FENTANYL CITRATE 50 UG/ML
INJECTION, SOLUTION INTRAMUSCULAR; INTRAVENOUS AS NEEDED
Status: DISCONTINUED | OUTPATIENT
Start: 2018-11-28 | End: 2018-11-28 | Stop reason: SURG

## 2018-11-28 RX ORDER — ONDANSETRON 2 MG/ML
4 INJECTION INTRAMUSCULAR; INTRAVENOUS AS NEEDED
Status: DISCONTINUED | OUTPATIENT
Start: 2018-11-28 | End: 2018-11-28 | Stop reason: HOSPADM

## 2018-11-28 RX ORDER — SODIUM CHLORIDE 0.9 % (FLUSH) 0.9 %
1-10 SYRINGE (ML) INJECTION AS NEEDED
Status: DISCONTINUED | OUTPATIENT
Start: 2018-11-28 | End: 2018-11-28 | Stop reason: HOSPADM

## 2018-11-28 RX ORDER — MIDAZOLAM HYDROCHLORIDE 1 MG/ML
1 INJECTION INTRAMUSCULAR; INTRAVENOUS
Status: DISCONTINUED | OUTPATIENT
Start: 2018-11-28 | End: 2018-11-28 | Stop reason: HOSPADM

## 2018-11-28 RX ORDER — SODIUM CHLORIDE 0.9 % (FLUSH) 0.9 %
3 SYRINGE (ML) INJECTION AS NEEDED
Status: DISCONTINUED | OUTPATIENT
Start: 2018-11-28 | End: 2018-11-28 | Stop reason: HOSPADM

## 2018-11-28 RX ORDER — SUCCINYLCHOLINE CHLORIDE 20 MG/ML
INJECTION INTRAMUSCULAR; INTRAVENOUS AS NEEDED
Status: DISCONTINUED | OUTPATIENT
Start: 2018-11-28 | End: 2018-11-28 | Stop reason: SURG

## 2018-11-28 RX ORDER — SODIUM CHLORIDE 0.9 % (FLUSH) 0.9 %
3 SYRINGE (ML) INJECTION EVERY 12 HOURS SCHEDULED
Status: DISCONTINUED | OUTPATIENT
Start: 2018-11-28 | End: 2018-11-28 | Stop reason: HOSPADM

## 2018-11-28 RX ORDER — ROCURONIUM BROMIDE 10 MG/ML
INJECTION, SOLUTION INTRAVENOUS AS NEEDED
Status: DISCONTINUED | OUTPATIENT
Start: 2018-11-28 | End: 2018-11-28 | Stop reason: SURG

## 2018-11-28 RX ORDER — HYDRALAZINE HYDROCHLORIDE 20 MG/ML
5 INJECTION INTRAMUSCULAR; INTRAVENOUS
Status: DISCONTINUED | OUTPATIENT
Start: 2018-11-28 | End: 2018-11-28 | Stop reason: HOSPADM

## 2018-11-28 RX ORDER — SODIUM CHLORIDE, SODIUM LACTATE, POTASSIUM CHLORIDE, CALCIUM CHLORIDE 600; 310; 30; 20 MG/100ML; MG/100ML; MG/100ML; MG/100ML
140 INJECTION, SOLUTION INTRAVENOUS CONTINUOUS
Status: DISCONTINUED | OUTPATIENT
Start: 2018-11-28 | End: 2018-11-29 | Stop reason: HOSPADM

## 2018-11-28 RX ORDER — KETOROLAC TROMETHAMINE 30 MG/ML
INJECTION, SOLUTION INTRAMUSCULAR; INTRAVENOUS AS NEEDED
Status: DISCONTINUED | OUTPATIENT
Start: 2018-11-28 | End: 2018-11-28 | Stop reason: SURG

## 2018-11-28 RX ORDER — DIPHENHYDRAMINE HYDROCHLORIDE 50 MG/ML
25 INJECTION INTRAMUSCULAR; INTRAVENOUS EVERY 6 HOURS PRN
Status: DISCONTINUED | OUTPATIENT
Start: 2018-11-28 | End: 2018-11-29 | Stop reason: HOSPADM

## 2018-11-28 RX ORDER — DULOXETIN HYDROCHLORIDE 30 MG/1
60 CAPSULE, DELAYED RELEASE ORAL DAILY
Status: DISCONTINUED | OUTPATIENT
Start: 2018-11-29 | End: 2018-11-29 | Stop reason: HOSPADM

## 2018-11-28 RX ORDER — GABAPENTIN 250 MG/5ML
250 SOLUTION ORAL ONCE
Status: COMPLETED | OUTPATIENT
Start: 2018-11-28 | End: 2018-11-28

## 2018-11-28 RX ORDER — SODIUM CHLORIDE 0.9 % (FLUSH) 0.9 %
3 SYRINGE (ML) INJECTION EVERY 12 HOURS SCHEDULED
Status: DISCONTINUED | OUTPATIENT
Start: 2018-11-28 | End: 2018-11-29 | Stop reason: HOSPADM

## 2018-11-28 RX ORDER — FLUMAZENIL 0.1 MG/ML
0.2 INJECTION INTRAVENOUS AS NEEDED
Status: DISCONTINUED | OUTPATIENT
Start: 2018-11-28 | End: 2018-11-28 | Stop reason: HOSPADM

## 2018-11-28 RX ORDER — MAGNESIUM HYDROXIDE 1200 MG/15ML
LIQUID ORAL AS NEEDED
Status: DISCONTINUED | OUTPATIENT
Start: 2018-11-28 | End: 2018-11-28 | Stop reason: HOSPADM

## 2018-11-28 RX ORDER — SODIUM CHLORIDE, SODIUM LACTATE, POTASSIUM CHLORIDE, CALCIUM CHLORIDE 600; 310; 30; 20 MG/100ML; MG/100ML; MG/100ML; MG/100ML
30 INJECTION, SOLUTION INTRAVENOUS CONTINUOUS
Status: DISCONTINUED | OUTPATIENT
Start: 2018-11-28 | End: 2018-11-28 | Stop reason: HOSPADM

## 2018-11-28 RX ORDER — DULOXETIN HYDROCHLORIDE 30 MG/1
30 CAPSULE, DELAYED RELEASE ORAL DAILY
Status: DISCONTINUED | OUTPATIENT
Start: 2018-11-29 | End: 2018-11-28

## 2018-11-28 RX ORDER — ONDANSETRON 2 MG/ML
4 INJECTION INTRAMUSCULAR; INTRAVENOUS EVERY 6 HOURS
Status: DISCONTINUED | OUTPATIENT
Start: 2018-11-28 | End: 2018-11-29 | Stop reason: HOSPADM

## 2018-11-28 RX ORDER — ONDANSETRON 2 MG/ML
INJECTION INTRAMUSCULAR; INTRAVENOUS AS NEEDED
Status: DISCONTINUED | OUTPATIENT
Start: 2018-11-28 | End: 2018-11-28 | Stop reason: SURG

## 2018-11-28 RX ORDER — HYDRALAZINE HYDROCHLORIDE 20 MG/ML
10 INJECTION INTRAMUSCULAR; INTRAVENOUS EVERY 4 HOURS PRN
Status: DISCONTINUED | OUTPATIENT
Start: 2018-11-28 | End: 2018-11-29 | Stop reason: HOSPADM

## 2018-11-28 RX ORDER — FENTANYL CITRATE 50 UG/ML
25 INJECTION, SOLUTION INTRAMUSCULAR; INTRAVENOUS AS NEEDED
Status: DISCONTINUED | OUTPATIENT
Start: 2018-11-28 | End: 2018-11-28 | Stop reason: HOSPADM

## 2018-11-28 RX ORDER — FENTANYL CITRATE 50 UG/ML
25 INJECTION, SOLUTION INTRAMUSCULAR; INTRAVENOUS
Status: DISCONTINUED | OUTPATIENT
Start: 2018-11-28 | End: 2018-11-28 | Stop reason: HOSPADM

## 2018-11-28 RX ORDER — METOCLOPRAMIDE HYDROCHLORIDE 5 MG/ML
5 INJECTION INTRAMUSCULAR; INTRAVENOUS
Status: DISCONTINUED | OUTPATIENT
Start: 2018-11-28 | End: 2018-11-28 | Stop reason: HOSPADM

## 2018-11-28 RX ADMIN — PROPOFOL 170 MG: 10 INJECTION, EMULSION INTRAVENOUS at 08:57

## 2018-11-28 RX ADMIN — HYDROCODONE BITARTRATE AND ACETAMINOPHEN 20 ML: 7.5; 325 SOLUTION ORAL at 18:50

## 2018-11-28 RX ADMIN — GLYCOPYRROLATE 0.3 MG: 0.2 INJECTION, SOLUTION INTRAMUSCULAR; INTRAVENOUS at 09:51

## 2018-11-28 RX ADMIN — SODIUM CHLORIDE, POTASSIUM CHLORIDE, SODIUM LACTATE AND CALCIUM CHLORIDE 140 ML/HR: 600; 310; 30; 20 INJECTION, SOLUTION INTRAVENOUS at 12:24

## 2018-11-28 RX ADMIN — Medication 4 MG: at 09:51

## 2018-11-28 RX ADMIN — CLINDAMYCIN IN 5 PERCENT DEXTROSE 900 MG: 18 INJECTION, SOLUTION INTRAVENOUS at 15:41

## 2018-11-28 RX ADMIN — KETOROLAC TROMETHAMINE 30 MG: 30 INJECTION, SOLUTION INTRAMUSCULAR at 09:51

## 2018-11-28 RX ADMIN — ROCURONIUM BROMIDE 35 MG: 10 INJECTION INTRAVENOUS at 09:15

## 2018-11-28 RX ADMIN — SODIUM CHLORIDE 500 ML: 9 INJECTION, SOLUTION INTRAVENOUS at 07:16

## 2018-11-28 RX ADMIN — SUCCINYLCHOLINE CHLORIDE 140 MG: 20 INJECTION, SOLUTION INTRAMUSCULAR; INTRAVENOUS at 08:57

## 2018-11-28 RX ADMIN — FAMOTIDINE 20 MG: 10 INJECTION INTRAVENOUS at 21:17

## 2018-11-28 RX ADMIN — LIDOCAINE HYDROCHLORIDE 1 EACH: 40 SOLUTION TOPICAL at 08:57

## 2018-11-28 RX ADMIN — ONDANSETRON HYDROCHLORIDE 4 MG: 2 INJECTION INTRAMUSCULAR; INTRAVENOUS at 18:50

## 2018-11-28 RX ADMIN — FENTANYL CITRATE 100 MCG: 50 INJECTION INTRAMUSCULAR; INTRAVENOUS at 08:57

## 2018-11-28 RX ADMIN — ONDANSETRON HYDROCHLORIDE 4 MG: 2 SOLUTION INTRAMUSCULAR; INTRAVENOUS at 09:51

## 2018-11-28 RX ADMIN — HYDROCODONE BITARTRATE AND ACETAMINOPHEN 20 ML: 7.5; 325 SOLUTION ORAL at 12:24

## 2018-11-28 RX ADMIN — LIDOCAINE HYDROCHLORIDE 0.5 ML: 10 INJECTION, SOLUTION EPIDURAL; INFILTRATION; INTRACAUDAL; PERINEURAL at 07:15

## 2018-11-28 RX ADMIN — ENOXAPARIN SODIUM 40 MG: 40 INJECTION SUBCUTANEOUS at 07:59

## 2018-11-28 RX ADMIN — METOCLOPRAMIDE 5 MG: 5 INJECTION, SOLUTION INTRAMUSCULAR; INTRAVENOUS at 18:50

## 2018-11-28 RX ADMIN — ROCURONIUM BROMIDE 5 MG: 10 INJECTION INTRAVENOUS at 08:57

## 2018-11-28 RX ADMIN — FENTANYL CITRATE 100 MCG: 50 INJECTION INTRAMUSCULAR; INTRAVENOUS at 09:15

## 2018-11-28 RX ADMIN — LIDOCAINE HYDROCHLORIDE 80 MG: 20 INJECTION, SOLUTION INFILTRATION; PERINEURAL at 08:57

## 2018-11-28 RX ADMIN — CLINDAMYCIN PHOSPHATE 900 MG: 18 INJECTION, SOLUTION INTRAVENOUS at 08:57

## 2018-11-28 RX ADMIN — SODIUM CHLORIDE, PRESERVATIVE FREE 3 ML: 5 INJECTION INTRAVENOUS at 21:19

## 2018-11-28 RX ADMIN — MIDAZOLAM HYDROCHLORIDE 2 MG: 1 INJECTION, SOLUTION INTRAMUSCULAR; INTRAVENOUS at 08:13

## 2018-11-28 RX ADMIN — SUGAMMADEX 200 MG: 100 INJECTION, SOLUTION INTRAVENOUS at 09:58

## 2018-11-28 RX ADMIN — SODIUM CHLORIDE, POTASSIUM CHLORIDE, SODIUM LACTATE AND CALCIUM CHLORIDE 30 ML/HR: 600; 310; 30; 20 INJECTION, SOLUTION INTRAVENOUS at 07:15

## 2018-11-28 RX ADMIN — METOCLOPRAMIDE 5 MG: 5 INJECTION, SOLUTION INTRAMUSCULAR; INTRAVENOUS at 12:24

## 2018-11-28 RX ADMIN — METRONIDAZOLE 500 MG: 500 SOLUTION INTRAVENOUS at 08:05

## 2018-11-28 RX ADMIN — SCOPALAMINE 1 PATCH: 1 PATCH, EXTENDED RELEASE TRANSDERMAL at 07:58

## 2018-11-28 RX ADMIN — KETOROLAC TROMETHAMINE 30 MG: 30 INJECTION, SOLUTION INTRAMUSCULAR at 10:06

## 2018-11-28 RX ADMIN — SODIUM CHLORIDE, POTASSIUM CHLORIDE, SODIUM LACTATE AND CALCIUM CHLORIDE 140 ML/HR: 600; 310; 30; 20 INJECTION, SOLUTION INTRAVENOUS at 18:49

## 2018-11-28 RX ADMIN — APREPITANT 80 MG: 80 CAPSULE ORAL at 07:30

## 2018-11-28 RX ADMIN — FAMOTIDINE 20 MG: 10 INJECTION INTRAVENOUS at 12:24

## 2018-11-28 RX ADMIN — ONDANSETRON HYDROCHLORIDE 4 MG: 2 INJECTION INTRAMUSCULAR; INTRAVENOUS at 12:24

## 2018-11-28 RX ADMIN — SODIUM CHLORIDE, POTASSIUM CHLORIDE, SODIUM LACTATE AND CALCIUM CHLORIDE 1000 ML: 600; 310; 30; 20 INJECTION, SOLUTION INTRAVENOUS at 07:22

## 2018-11-28 RX ADMIN — FENTANYL CITRATE 50 MCG: 50 INJECTION INTRAMUSCULAR; INTRAVENOUS at 09:59

## 2018-11-28 RX ADMIN — GABAPENTIN 250 MG: 250 SOLUTION ORAL at 06:34

## 2018-11-28 NOTE — ANESTHESIA POSTPROCEDURE EVALUATION
"Patient: Cathi Krishnan    Procedure Summary     Date:  11/28/18 Room / Location:   [facility] OR  /  [facility]    Anesthesia Start:  0848 Anesthesia Stop:  1007    Procedure:  GASTRIC SLEEVE LAPAROSCOPIC (N/A Abdomen) Diagnosis:       Obesity, Class III, BMI 40-49.9 (morbid obesity) (CMS/[facility])      Gastroesophageal reflux disease, esophagitis presence not specified      (Obesity, Class III, BMI 40-49.9 (morbid obesity) (CMS/[facility]) [E66.01])      (Gastroesophageal reflux disease, esophagitis presence not specified [K21.9])    Surgeon:  Julio C Rodriguez MD Provider:  Kaleb Rodriguez CRNA    Anesthesia Type:  general ASA Status:  3          Anesthesia Type: general  Last vitals  BP   125/71 (11/28/18 1217)   Temp   97.3 °F (36.3 °C) (11/28/18 1217)   Pulse   69 (11/28/18 1217)   Resp   16 (11/28/18 1217)     SpO2   94 % (11/28/18 1217)     Post Anesthesia Care and Evaluation    Patient location during evaluation: PACU  Patient participation: complete - patient participated  Level of consciousness: awake and alert  Pain management: adequate  Airway patency: patent  Anesthetic complications: No anesthetic complications    Cardiovascular status: acceptable  Respiratory status: acceptable  Hydration status: acceptable    Comments: Blood pressure 125/71, pulse 69, temperature 97.3 °F (36.3 °C), temperature source Axillary, resp. rate 16, height 157 cm (61.81\"), weight 107 kg (236 lb 1.8 oz), last menstrual period 10/31/2018, SpO2 94 %.    Pt discharged from PACU based on kishan score >8      "

## 2018-11-28 NOTE — ANESTHESIA PREPROCEDURE EVALUATION
Anesthesia Evaluation     Patient summary reviewed and Nursing notes reviewed   no history of anesthetic complications:  NPO Solid Status: > 8 hours  NPO Liquid Status: < 2 hours           Airway   Mallampati: I  TM distance: >3 FB  Neck ROM: full  Dental    (+) upper dentures and poor dentition    Pulmonary - negative pulmonary ROS   Cardiovascular - negative cardio ROS    ECG reviewed        Neuro/Psych  (+) psychiatric history Anxiety and Depression,     GI/Hepatic/Renal/Endo    (+) morbid obesity, GERD,      Musculoskeletal     (+) back pain,   Abdominal    Substance History      OB/GYN          Other   (+) arthritis                     Anesthesia Plan    ASA 3     general     intravenous induction   Anesthetic plan, all risks, benefits, and alternatives have been provided, discussed and informed consent has been obtained with: patient.

## 2018-11-28 NOTE — ANESTHESIA PROCEDURE NOTES
ANESTHESIA INTUBATION  Urgency: elective    Date/Time: 11/28/2018 8:59 AM  Airway not difficult    General Information and Staff    Patient location during procedure: OR  CRNA: Kaleb Rodrgiuez CRNA    Indications and Patient Condition  Indications for airway management: airway protection    Preoxygenated: yes  Mask difficulty assessment: 1 - vent by mask    Final Airway Details  Final airway type: endotracheal airway      Successful airway: ETT  Cuffed: yes   Successful intubation technique: direct laryngoscopy  Facilitating devices/methods: intubating stylet  Blade: Brenda  Blade size: 4  ETT size (mm): 7.5  Cormack-Lehane Classification: grade I - full view of glottis  Placement verified by: chest auscultation and capnometry   Measured from: lips  ETT to lips (cm): 21  Number of attempts at approach: 1

## 2018-11-29 VITALS
BODY MASS INDEX: 43.45 KG/M2 | HEART RATE: 67 BPM | SYSTOLIC BLOOD PRESSURE: 103 MMHG | OXYGEN SATURATION: 94 % | DIASTOLIC BLOOD PRESSURE: 62 MMHG | RESPIRATION RATE: 17 BRPM | HEIGHT: 62 IN | WEIGHT: 236.11 LBS | TEMPERATURE: 97.6 F

## 2018-11-29 PROBLEM — Z98.84 STATUS POST LAPAROSCOPIC SLEEVE GASTRECTOMY: Status: ACTIVE | Noted: 2018-11-28

## 2018-11-29 PROCEDURE — 94799 UNLISTED PULMONARY SVC/PX: CPT

## 2018-11-29 PROCEDURE — 25010000002 ENOXAPARIN PER 10 MG: Performed by: SURGERY

## 2018-11-29 PROCEDURE — 25010000002 ONDANSETRON PER 1 MG: Performed by: SURGERY

## 2018-11-29 PROCEDURE — 94760 N-INVAS EAR/PLS OXIMETRY 1: CPT

## 2018-11-29 PROCEDURE — 25010000002 METOCLOPRAMIDE PER 10 MG: Performed by: SURGERY

## 2018-11-29 RX ORDER — ONDANSETRON 4 MG/1
4 TABLET, ORALLY DISINTEGRATING ORAL EVERY 8 HOURS PRN
Qty: 30 TABLET | Refills: 1 | Status: SHIPPED | OUTPATIENT
Start: 2018-11-29 | End: 2019-06-03

## 2018-11-29 RX ORDER — SIMETHICONE 80 MG
40 TABLET,CHEWABLE ORAL EVERY 6 HOURS PRN
Qty: 30 TABLET | Refills: 1 | Status: SHIPPED | OUTPATIENT
Start: 2018-11-29

## 2018-11-29 RX ADMIN — METOCLOPRAMIDE 5 MG: 5 INJECTION, SOLUTION INTRAMUSCULAR; INTRAVENOUS at 06:09

## 2018-11-29 RX ADMIN — ONDANSETRON HYDROCHLORIDE 4 MG: 2 INJECTION INTRAMUSCULAR; INTRAVENOUS at 00:13

## 2018-11-29 RX ADMIN — ENOXAPARIN SODIUM 40 MG: 40 INJECTION SUBCUTANEOUS at 08:44

## 2018-11-29 RX ADMIN — HYDROCODONE BITARTRATE AND ACETAMINOPHEN 10 ML: 7.5; 325 SOLUTION ORAL at 06:09

## 2018-11-29 RX ADMIN — SIMETHICONE 40 MG: 80 TABLET, CHEWABLE ORAL at 09:38

## 2018-11-29 RX ADMIN — FAMOTIDINE 20 MG: 10 INJECTION INTRAVENOUS at 08:44

## 2018-11-29 RX ADMIN — HYDROCODONE BITARTRATE AND ACETAMINOPHEN 10 ML: 7.5; 325 SOLUTION ORAL at 10:10

## 2018-11-29 RX ADMIN — METOCLOPRAMIDE 5 MG: 5 INJECTION, SOLUTION INTRAMUSCULAR; INTRAVENOUS at 00:13

## 2018-11-29 RX ADMIN — HYDROCODONE BITARTRATE AND ACETAMINOPHEN 20 ML: 7.5; 325 SOLUTION ORAL at 00:02

## 2018-11-29 RX ADMIN — ONDANSETRON HYDROCHLORIDE 4 MG: 2 INJECTION INTRAMUSCULAR; INTRAVENOUS at 06:09

## 2018-11-29 RX ADMIN — CLINDAMYCIN IN 5 PERCENT DEXTROSE 900 MG: 18 INJECTION, SOLUTION INTRAVENOUS at 00:02

## 2018-11-29 RX ADMIN — DULOXETINE HYDROCHLORIDE 60 MG: 30 CAPSULE, DELAYED RELEASE ORAL at 08:44

## 2018-11-30 ENCOUNTER — RESULTS ENCOUNTER (OUTPATIENT)
Dept: BARIATRICS/WEIGHT MGMT | Facility: CLINIC | Age: 38
End: 2018-11-30

## 2018-11-30 ENCOUNTER — TELEPHONE (OUTPATIENT)
Dept: BARIATRICS/WEIGHT MGMT | Facility: CLINIC | Age: 38
End: 2018-11-30

## 2018-11-30 DIAGNOSIS — E66.01 OBESITY, CLASS III, BMI 40-49.9 (MORBID OBESITY) (HCC): ICD-10-CM

## 2018-11-30 NOTE — TELEPHONE ENCOUNTER
PH: [phone]    Patient doing:       PROCEDURE INFORMATION:   Date of Procedure:  Follow up appointment:    1. Are you tolerating liquids? yes  Reason:     2. How many ounces of liquid are you getting per day? 32    3. Are you ambulating well? Yes no problems    4. Do you have nausea or vomiting? no    5. How are your wounds? Yes they look good    6. Do you have any concerns?  none      [unfilled]  1:12 PM

## 2018-12-03 ENCOUNTER — TELEPHONE (OUTPATIENT)
Dept: BARIATRICS/WEIGHT MGMT | Facility: CLINIC | Age: 38
End: 2018-12-03

## 2018-12-03 NOTE — TELEPHONE ENCOUNTER
Patient was informed that we had to cancel her appt for today due to unforseen circumstances and we would reschedule it as soon as possible.

## 2018-12-04 LAB
CYTO UR: NORMAL
LAB AP CASE REPORT: NORMAL
PATH REPORT.FINAL DX SPEC: NORMAL
PATH REPORT.GROSS SPEC: NORMAL

## 2018-12-05 ENCOUNTER — OFFICE VISIT (OUTPATIENT)
Dept: BARIATRICS/WEIGHT MGMT | Facility: CLINIC | Age: 38
End: 2018-12-05

## 2018-12-05 VITALS
HEIGHT: 62 IN | TEMPERATURE: 98 F | WEIGHT: 229 LBS | BODY MASS INDEX: 42.14 KG/M2 | OXYGEN SATURATION: 100 % | HEART RATE: 90 BPM | DIASTOLIC BLOOD PRESSURE: 76 MMHG | SYSTOLIC BLOOD PRESSURE: 123 MMHG

## 2018-12-05 DIAGNOSIS — Z98.84 STATUS POST LAPAROSCOPIC SLEEVE GASTRECTOMY: Primary | ICD-10-CM

## 2018-12-05 DIAGNOSIS — E66.01 OBESITY, CLASS III, BMI 40-49.9 (MORBID OBESITY) (HCC): ICD-10-CM

## 2018-12-05 PROCEDURE — 99024 POSTOP FOLLOW-UP VISIT: CPT | Performed by: NURSE PRACTITIONER

## 2018-12-05 NOTE — PATIENT INSTRUCTIONS
Patient is doing well overall.  Goals: continue positive lifestyle changes with diet and exercise.   Continue multiple vitamin, calcium, and B12 supplements as advised.   Handouts provided on post op expectations, 5 1/2 cup meals per day, and support group meeting dates/times.   Patient will follow up for one month appt and will call the office if needs anything prior to that appointment.

## 2018-12-05 NOTE — PROGRESS NOTES
"Subjective   Cathi Krishnan is a 38 y.o. female.     History of Present Illness   Cathi Krishnan is post sleeve gastrectomy bariatric surgery. This is her one week follow-up. Patient is able to get 32 ounces of water in per day. Patient is eating 4 meals per day and approximately 1/2 cup in volume. Patient is able to get 60 grams of protein in per day. Patient is exercising by walking for 15 minutes daily. Patient does not have any complaints of reflux, dysphagia, N/V, or abdominal pain.   Vitals:    12/05/18 0842   BP: 123/76   BP Location: Left arm   Patient Position: Sitting   Cuff Size: Adult   Pulse: 90   Temp: 98 °F (36.7 °C)   SpO2: 100%   Weight: 104 kg (229 lb)   Height: 157.5 cm (62\")         The following portions of the patient's history were reviewed and updated as appropriate: allergies, current medications, past family history, past medical history, past social history, past surgical history and problem list.    Review of Systems   Constitutional: Negative for activity change, appetite change and fatigue.   HENT: Negative.    Eyes: Negative.    Respiratory: Negative.  Negative for apnea, cough, chest tightness and shortness of breath.    Cardiovascular: Negative.  Negative for chest pain, palpitations and leg swelling.   Gastrointestinal: Negative.  Negative for abdominal pain, anal bleeding, constipation, nausea and vomiting.   Endocrine: Negative.    Genitourinary: Positive for frequency.   Musculoskeletal: Positive for arthralgias and back pain.   Skin: Negative.    Allergic/Immunologic: Negative.    Neurological: Negative.  Negative for seizures and syncope.   Hematological: Negative.  Does not bruise/bleed easily.   Psychiatric/Behavioral: Positive for dysphoric mood. Negative for self-injury, sleep disturbance and suicidal ideas. The patient is nervous/anxious.        Objective   Physical Exam   Constitutional: She is oriented to person, place, and time. Vital signs are normal. She appears " well-developed and well-nourished. She is cooperative. No distress.   HENT:   Head: Normocephalic and atraumatic.   Nose: Nose normal.   Mouth/Throat: Oropharynx is clear and moist. No oropharyngeal exudate or tonsillar abscesses.   Eyes: Conjunctivae, EOM and lids are normal. Pupils are equal, round, and reactive to light. Right eye exhibits no discharge. Left eye exhibits no discharge.   Neck: Trachea normal. Neck supple. No JVD present. Carotid bruit is not present. No neck rigidity. No tracheal deviation present. No thyromegaly present.   Cardiovascular: Normal rate, regular rhythm, S1 normal, S2 normal and normal heart sounds.   Pulmonary/Chest: Effort normal and breath sounds normal. No stridor. No respiratory distress. She has no wheezes. She has no rales.   Abdominal: Soft. Bowel sounds are normal. She exhibits no distension. There is no tenderness.   Obese; post op incisions healing well, no signs of infection noted, edges well approximated; abdominal binder in place   Musculoskeletal: She exhibits no edema.        Right shoulder: She exhibits normal strength.   Lymphadenopathy:     She has no cervical adenopathy.   Neurological: She is alert and oriented to person, place, and time. She has normal strength. No cranial nerve deficit.   Skin: Skin is warm, dry and intact. No rash noted.   Psychiatric: She has a normal mood and affect. Her speech is normal and behavior is normal.   Alert and oriented x 3   Vitals reviewed.      Assessment/Plan   Cathi was seen today for post-op and obesity.    Diagnoses and all orders for this visit:    Status post laparoscopic sleeve gastrectomy    Obesity, Class III, BMI 40-49.9 (morbid obesity) (CMS/HCC)              Patient is doing well overall.  Goals: continue positive lifestyle changes with diet and exercise.   Continue multiple vitamin, calcium, and B12 supplements as advised.   Handouts provided on post op expectations, 5 1/2 cup meals per day, and support group  meeting dates/times.   Patient will follow up for one month appt and will call the office if needs anything prior to that appointment.

## 2018-12-07 ENCOUNTER — OFFICE VISIT (OUTPATIENT)
Dept: PSYCHIATRY | Age: 38
End: 2018-12-07
Payer: COMMERCIAL

## 2018-12-07 VITALS
DIASTOLIC BLOOD PRESSURE: 79 MMHG | HEIGHT: 62 IN | SYSTOLIC BLOOD PRESSURE: 121 MMHG | HEART RATE: 90 BPM | BODY MASS INDEX: 41.96 KG/M2 | WEIGHT: 228 LBS | OXYGEN SATURATION: 96 %

## 2018-12-07 DIAGNOSIS — F41.1 GAD (GENERALIZED ANXIETY DISORDER): Primary | ICD-10-CM

## 2018-12-07 PROCEDURE — 90832 PSYTX W PT 30 MINUTES: CPT | Performed by: COUNSELOR

## 2018-12-07 NOTE — PROGRESS NOTES
TOBACCO:   reports that she has never smoked. She has never used smokeless tobacco.  ETOH:   has no alcohol history on file. Family History:   No family history on file. Diagnosis:    FIDELIA  No past medical history on file. Other psychosocial and environmental problems    Plan:  1. Continue medication management  2. CBT to target depression and anxiety  3.  Discuss behavioral activation and activity list    Pt interventions:  Provided education, Discussed self-care (sleep, nutrition, rewarding activities, social support, exercise), Motivational Interviewing to determine importance and readiness for change, Discussed use of imagery, distractions, relaxation, mood management, communication training, questioning unhelpful thinking, problem-solving, and behavioral activation to manage pain, Supportive techniques, Emphasized self-care as important for managing overall health and CBT to target depression and anxiety      [facility]

## 2018-12-12 ENCOUNTER — TELEPHONE (OUTPATIENT)
Dept: BARIATRICS/WEIGHT MGMT | Facility: CLINIC | Age: 38
End: 2018-12-12

## 2019-01-03 ENCOUNTER — OFFICE VISIT (OUTPATIENT)
Dept: BARIATRICS/WEIGHT MGMT | Facility: CLINIC | Age: 39
End: 2019-01-03

## 2019-01-03 VITALS
SYSTOLIC BLOOD PRESSURE: 135 MMHG | WEIGHT: 224.6 LBS | HEIGHT: 62 IN | DIASTOLIC BLOOD PRESSURE: 75 MMHG | BODY MASS INDEX: 41.33 KG/M2 | TEMPERATURE: 97.5 F | HEART RATE: 79 BPM

## 2019-01-03 DIAGNOSIS — E66.01 OBESITY, CLASS III, BMI 40-49.9 (MORBID OBESITY) (HCC): Primary | ICD-10-CM

## 2019-01-03 PROCEDURE — 99024 POSTOP FOLLOW-UP VISIT: CPT | Performed by: SURGERY

## 2019-01-03 NOTE — PROGRESS NOTES
"Subjective   Cathi Krishnan is a 38 y.o. female.     Cathi is post op 1 month from her sleeve gastrectomy procedure.  She is currently on her regular diet.  She states that she is doing well with regards to her progress.  She states her heartburn has improved, and denies constipation.  She states she eats 4 meals a day with portion sizes of half a cup.  She consumes 64 ounces of water and 30 g of protein daily.  She exercises in the form of walking for 10 minutes however she has recently injured her left knee and is being evaluated for this by her primary care provider.    Review Of Systems:  General ROS: negative  Respiratory ROS: no cough, shortness of breath, or wheezing  Cardiovascular ROS: no chest pain or dyspnea on exertion  Gastrointestinal ROS: no abdominal pain, change in bowel habits, or black or bloody stools    The following portions of the patient's history were reviewed and updated as appropriate: allergies, current medications, past medical history, past surgical history and problem list.    Objective   /75 (BP Location: Left arm, Patient Position: Sitting, Cuff Size: Adult)   Pulse 79   Temp 97.5 °F (36.4 °C)   Ht 157.5 cm (62\")   Wt 102 kg (224 lb 9.6 oz)   BMI 41.08 kg/m²     General Appearance:  awake, alert, oriented, in no acute distress  Lungs:  Normal expansion.  Clear to auscultation.  No rales, rhonchi, or wheezing.  Heart:  Heart sounds are normal.  Regular rate and rhythm without murmur, gallop or rub.  Abdomen:  Soft, non-tender, normal bowel sounds; no bruits, organomegaly or masses.  Abnormal shape: obese  Extremities: Extremities warm to touch, pink, with no edema.  Wounds: clean, dry, intact    Assessment/Plan     Encounter Diagnoses   Name Primary?   • Obesity, Class III, BMI 40-49.9 (morbid obesity) (CMS/HCC) Yes       Cathi Krishnan and LILY discussed the importance of changing behavior for consistent and successful weight loss.  We first reviewed again the " definition of a meal which is defined as portion sizes less than a half a cup and those portions incorporating a protein.  Specifically the protein should fill half of that volume.  I also explained that she should attempt to consume 4 meals as defined above daily and to avoid snacking or grazing.  She should also be mindful of adequate hydration by consuming at least 64 oz of water daily and incorporation of a regular exercise regimen.   I discussed the importance of taking her multivitamins as directed.  She is to return to our office in 2 months or as needed.    01/03/19  9:54 AM  Patient Care Team:  Shlomo Lira MD as PCP - General (Family Medicine)  Julio C Rodriguez MD FACS

## 2019-01-16 PROBLEM — S83.241A ACUTE MEDIAL MENISCUS TEAR, RIGHT, INITIAL ENCOUNTER: Status: ACTIVE | Noted: 2019-01-16

## 2019-01-17 ENCOUNTER — ANESTHESIA (OUTPATIENT)
Dept: OPERATING ROOM | Age: 39
End: 2019-01-17
Payer: COMMERCIAL

## 2019-01-17 ENCOUNTER — HOSPITAL ENCOUNTER (OUTPATIENT)
Age: 39
Setting detail: OUTPATIENT SURGERY
Discharge: HOME | End: 2019-01-17
Attending: ORTHOPAEDIC SURGERY | Admitting: ORTHOPAEDIC SURGERY
Payer: COMMERCIAL

## 2019-01-17 ENCOUNTER — ANESTHESIA EVENT (OUTPATIENT)
Dept: OPERATING ROOM | Age: 39
End: 2019-01-17
Payer: COMMERCIAL

## 2019-01-17 VITALS
RESPIRATION RATE: 14 BRPM | WEIGHT: 220 LBS | TEMPERATURE: 98.2 F | OXYGEN SATURATION: 100 % | BODY MASS INDEX: 40.48 KG/M2 | HEART RATE: 52 BPM | DIASTOLIC BLOOD PRESSURE: 73 MMHG | SYSTOLIC BLOOD PRESSURE: 112 MMHG | HEIGHT: 62 IN

## 2019-01-17 VITALS
DIASTOLIC BLOOD PRESSURE: 68 MMHG | SYSTOLIC BLOOD PRESSURE: 109 MMHG | RESPIRATION RATE: 3 BRPM | OXYGEN SATURATION: 100 %

## 2019-01-17 DIAGNOSIS — S83.241A ACUTE MEDIAL MENISCUS TEAR, RIGHT, INITIAL ENCOUNTER: Primary | ICD-10-CM

## 2019-01-17 LAB — HCG(URINE) PREGNANCY TEST: NEGATIVE

## 2019-01-17 PROCEDURE — 3700000000 HC ANESTHESIA ATTENDED CARE: Performed by: ORTHOPAEDIC SURGERY

## 2019-01-17 PROCEDURE — 6360000002 HC RX W HCPCS: Performed by: NURSE ANESTHETIST, CERTIFIED REGISTERED

## 2019-01-17 PROCEDURE — 2500000003 HC RX 250 WO HCPCS: Performed by: ORTHOPAEDIC SURGERY

## 2019-01-17 PROCEDURE — 84703 CHORIONIC GONADOTROPIN ASSAY: CPT

## 2019-01-17 PROCEDURE — 7100000000 HC PACU RECOVERY - FIRST 15 MIN: Performed by: ORTHOPAEDIC SURGERY

## 2019-01-17 PROCEDURE — 7100000011 HC PHASE II RECOVERY - ADDTL 15 MIN: Performed by: ORTHOPAEDIC SURGERY

## 2019-01-17 PROCEDURE — 3700000001 HC ADD 15 MINUTES (ANESTHESIA): Performed by: ORTHOPAEDIC SURGERY

## 2019-01-17 PROCEDURE — 2500000003 HC RX 250 WO HCPCS: Performed by: NURSE ANESTHETIST, CERTIFIED REGISTERED

## 2019-01-17 PROCEDURE — 7100000010 HC PHASE II RECOVERY - FIRST 15 MIN: Performed by: ORTHOPAEDIC SURGERY

## 2019-01-17 PROCEDURE — 3600000004 HC SURGERY LEVEL 4 BASE: Performed by: ORTHOPAEDIC SURGERY

## 2019-01-17 PROCEDURE — 6360000002 HC RX W HCPCS: Performed by: ORTHOPAEDIC SURGERY

## 2019-01-17 PROCEDURE — 3600000014 HC SURGERY LEVEL 4 ADDTL 15MIN: Performed by: ORTHOPAEDIC SURGERY

## 2019-01-17 PROCEDURE — 7100000001 HC PACU RECOVERY - ADDTL 15 MIN: Performed by: ORTHOPAEDIC SURGERY

## 2019-01-17 PROCEDURE — 2580000003 HC RX 258: Performed by: ORTHOPAEDIC SURGERY

## 2019-01-17 PROCEDURE — 2709999900 HC NON-CHARGEABLE SUPPLY: Performed by: ORTHOPAEDIC SURGERY

## 2019-01-17 PROCEDURE — 2720000010 HC SURG SUPPLY STERILE: Performed by: ORTHOPAEDIC SURGERY

## 2019-01-17 PROCEDURE — 2500000003 HC RX 250 WO HCPCS: Performed by: ANESTHESIOLOGY

## 2019-01-17 RX ORDER — MIDAZOLAM HYDROCHLORIDE 1 MG/ML
2 INJECTION INTRAMUSCULAR; INTRAVENOUS
Status: DISCONTINUED | OUTPATIENT
Start: 2019-01-17 | End: 2019-01-17 | Stop reason: HOSPADM

## 2019-01-17 RX ORDER — SODIUM CHLORIDE, SODIUM LACTATE, POTASSIUM CHLORIDE, CALCIUM CHLORIDE 600; 310; 30; 20 MG/100ML; MG/100ML; MG/100ML; MG/100ML
INJECTION, SOLUTION INTRAVENOUS CONTINUOUS
Status: DISCONTINUED | OUTPATIENT
Start: 2019-01-17 | End: 2019-01-17 | Stop reason: HOSPADM

## 2019-01-17 RX ORDER — PROPOFOL 10 MG/ML
INJECTION, EMULSION INTRAVENOUS PRN
Status: DISCONTINUED | OUTPATIENT
Start: 2019-01-17 | End: 2019-01-17 | Stop reason: SDUPTHER

## 2019-01-17 RX ORDER — PROMETHAZINE HYDROCHLORIDE 25 MG/ML
6.25 INJECTION, SOLUTION INTRAMUSCULAR; INTRAVENOUS
Status: DISCONTINUED | OUTPATIENT
Start: 2019-01-17 | End: 2019-01-17 | Stop reason: HOSPADM

## 2019-01-17 RX ORDER — FENTANYL CITRATE 50 UG/ML
INJECTION, SOLUTION INTRAMUSCULAR; INTRAVENOUS PRN
Status: DISCONTINUED | OUTPATIENT
Start: 2019-01-17 | End: 2019-01-17 | Stop reason: SDUPTHER

## 2019-01-17 RX ORDER — FENTANYL CITRATE 50 UG/ML
25 INJECTION, SOLUTION INTRAMUSCULAR; INTRAVENOUS
Status: DISCONTINUED | OUTPATIENT
Start: 2019-01-17 | End: 2019-01-17 | Stop reason: HOSPADM

## 2019-01-17 RX ORDER — LIDOCAINE HYDROCHLORIDE 10 MG/ML
1 INJECTION, SOLUTION EPIDURAL; INFILTRATION; INTRACAUDAL; PERINEURAL
Status: COMPLETED | OUTPATIENT
Start: 2019-01-17 | End: 2019-01-17

## 2019-01-17 RX ORDER — OXYCODONE HYDROCHLORIDE AND ACETAMINOPHEN 5; 325 MG/1; MG/1
1-2 TABLET ORAL
Qty: 35 TABLET | Refills: 0 | Status: SHIPPED | OUTPATIENT
Start: 2019-01-17 | End: 2019-01-21

## 2019-01-17 RX ORDER — DIPHENHYDRAMINE HYDROCHLORIDE 50 MG/ML
12.5 INJECTION INTRAMUSCULAR; INTRAVENOUS
Status: DISCONTINUED | OUTPATIENT
Start: 2019-01-17 | End: 2019-01-17 | Stop reason: HOSPADM

## 2019-01-17 RX ORDER — SODIUM CHLORIDE 0.9 % (FLUSH) 0.9 %
10 SYRINGE (ML) INJECTION EVERY 12 HOURS SCHEDULED
Status: DISCONTINUED | OUTPATIENT
Start: 2019-01-17 | End: 2019-01-17 | Stop reason: HOSPADM

## 2019-01-17 RX ORDER — BUPIVACAINE HYDROCHLORIDE 5 MG/ML
INJECTION, SOLUTION PERINEURAL PRN
Status: DISCONTINUED | OUTPATIENT
Start: 2019-01-17 | End: 2019-01-17 | Stop reason: HOSPADM

## 2019-01-17 RX ORDER — FENTANYL CITRATE 50 UG/ML
50 INJECTION, SOLUTION INTRAMUSCULAR; INTRAVENOUS
Status: DISCONTINUED | OUTPATIENT
Start: 2019-01-17 | End: 2019-01-17 | Stop reason: HOSPADM

## 2019-01-17 RX ORDER — MORPHINE SULFATE 2 MG/ML
2 INJECTION, SOLUTION INTRAMUSCULAR; INTRAVENOUS EVERY 5 MIN PRN
Status: DISCONTINUED | OUTPATIENT
Start: 2019-01-17 | End: 2019-01-17 | Stop reason: HOSPADM

## 2019-01-17 RX ORDER — PANTOPRAZOLE SODIUM 40 MG/1
40 TABLET, DELAYED RELEASE ORAL DAILY
COMMUNITY

## 2019-01-17 RX ORDER — ONDANSETRON 2 MG/ML
INJECTION INTRAMUSCULAR; INTRAVENOUS PRN
Status: DISCONTINUED | OUTPATIENT
Start: 2019-01-17 | End: 2019-01-17 | Stop reason: SDUPTHER

## 2019-01-17 RX ORDER — MORPHINE SULFATE 2 MG/ML
4 INJECTION, SOLUTION INTRAMUSCULAR; INTRAVENOUS EVERY 5 MIN PRN
Status: DISCONTINUED | OUTPATIENT
Start: 2019-01-17 | End: 2019-01-17 | Stop reason: HOSPADM

## 2019-01-17 RX ORDER — LIDOCAINE HYDROCHLORIDE 10 MG/ML
INJECTION, SOLUTION INFILTRATION; PERINEURAL PRN
Status: DISCONTINUED | OUTPATIENT
Start: 2019-01-17 | End: 2019-01-17 | Stop reason: HOSPADM

## 2019-01-17 RX ORDER — HYDRALAZINE HYDROCHLORIDE 20 MG/ML
5 INJECTION INTRAMUSCULAR; INTRAVENOUS EVERY 10 MIN PRN
Status: DISCONTINUED | OUTPATIENT
Start: 2019-01-17 | End: 2019-01-17 | Stop reason: HOSPADM

## 2019-01-17 RX ORDER — MIDAZOLAM HYDROCHLORIDE 1 MG/ML
INJECTION INTRAMUSCULAR; INTRAVENOUS PRN
Status: DISCONTINUED | OUTPATIENT
Start: 2019-01-17 | End: 2019-01-17 | Stop reason: SDUPTHER

## 2019-01-17 RX ORDER — ENALAPRILAT 2.5 MG/2ML
1.25 INJECTION INTRAVENOUS
Status: DISCONTINUED | OUTPATIENT
Start: 2019-01-17 | End: 2019-01-17 | Stop reason: HOSPADM

## 2019-01-17 RX ORDER — SODIUM CHLORIDE 0.9 % (FLUSH) 0.9 %
10 SYRINGE (ML) INJECTION PRN
Status: DISCONTINUED | OUTPATIENT
Start: 2019-01-17 | End: 2019-01-17 | Stop reason: HOSPADM

## 2019-01-17 RX ORDER — METOCLOPRAMIDE HYDROCHLORIDE 5 MG/ML
10 INJECTION INTRAMUSCULAR; INTRAVENOUS
Status: DISCONTINUED | OUTPATIENT
Start: 2019-01-17 | End: 2019-01-17 | Stop reason: HOSPADM

## 2019-01-17 RX ORDER — ONDANSETRON 4 MG/1
4 TABLET, FILM COATED ORAL EVERY 8 HOURS PRN
Qty: 10 TABLET | Refills: 0 | Status: SHIPPED | OUTPATIENT
Start: 2019-01-17

## 2019-01-17 RX ORDER — SODIUM CHLORIDE 9 MG/ML
INJECTION, SOLUTION INTRAVENOUS CONTINUOUS
Status: DISCONTINUED | OUTPATIENT
Start: 2019-01-17 | End: 2019-01-17 | Stop reason: HOSPADM

## 2019-01-17 RX ORDER — KETOROLAC TROMETHAMINE 30 MG/ML
INJECTION, SOLUTION INTRAMUSCULAR; INTRAVENOUS PRN
Status: DISCONTINUED | OUTPATIENT
Start: 2019-01-17 | End: 2019-01-17 | Stop reason: HOSPADM

## 2019-01-17 RX ORDER — LABETALOL HYDROCHLORIDE 5 MG/ML
5 INJECTION, SOLUTION INTRAVENOUS EVERY 10 MIN PRN
Status: DISCONTINUED | OUTPATIENT
Start: 2019-01-17 | End: 2019-01-17 | Stop reason: HOSPADM

## 2019-01-17 RX ORDER — MEPERIDINE HYDROCHLORIDE 50 MG/ML
12.5 INJECTION INTRAMUSCULAR; INTRAVENOUS; SUBCUTANEOUS EVERY 5 MIN PRN
Status: DISCONTINUED | OUTPATIENT
Start: 2019-01-17 | End: 2019-01-17 | Stop reason: HOSPADM

## 2019-01-17 RX ORDER — LIDOCAINE HYDROCHLORIDE 10 MG/ML
INJECTION, SOLUTION EPIDURAL; INFILTRATION; INTRACAUDAL; PERINEURAL PRN
Status: DISCONTINUED | OUTPATIENT
Start: 2019-01-17 | End: 2019-01-17 | Stop reason: SDUPTHER

## 2019-01-17 RX ORDER — OXYCODONE HYDROCHLORIDE AND ACETAMINOPHEN 5; 325 MG/1; MG/1
1 TABLET ORAL PRN
Status: DISCONTINUED | OUTPATIENT
Start: 2019-01-17 | End: 2019-01-17 | Stop reason: HOSPADM

## 2019-01-17 RX ORDER — OXYCODONE HYDROCHLORIDE AND ACETAMINOPHEN 5; 325 MG/1; MG/1
2 TABLET ORAL PRN
Status: DISCONTINUED | OUTPATIENT
Start: 2019-01-17 | End: 2019-01-17 | Stop reason: HOSPADM

## 2019-01-17 RX ADMIN — FENTANYL CITRATE 100 MCG: 50 INJECTION INTRAMUSCULAR; INTRAVENOUS at 12:18

## 2019-01-17 RX ADMIN — ONDANSETRON HYDROCHLORIDE 4 MG: 2 INJECTION, SOLUTION INTRAMUSCULAR; INTRAVENOUS at 12:52

## 2019-01-17 RX ADMIN — LIDOCAINE HYDROCHLORIDE 50 MG: 10 INJECTION, SOLUTION EPIDURAL; INFILTRATION; INTRACAUDAL; PERINEURAL at 12:20

## 2019-01-17 RX ADMIN — SODIUM CHLORIDE, SODIUM LACTATE, POTASSIUM CHLORIDE, AND CALCIUM CHLORIDE: 600; 310; 30; 20 INJECTION, SOLUTION INTRAVENOUS at 12:51

## 2019-01-17 RX ADMIN — HYDROMORPHONE HYDROCHLORIDE 0.5 MG: 1 INJECTION, SOLUTION INTRAMUSCULAR; INTRAVENOUS; SUBCUTANEOUS at 13:42

## 2019-01-17 RX ADMIN — Medication 2 G: at 12:21

## 2019-01-17 RX ADMIN — HYDROMORPHONE HYDROCHLORIDE 0.5 MG: 1 INJECTION, SOLUTION INTRAMUSCULAR; INTRAVENOUS; SUBCUTANEOUS at 13:50

## 2019-01-17 RX ADMIN — FENTANYL CITRATE 50 MCG: 50 INJECTION INTRAMUSCULAR; INTRAVENOUS at 12:34

## 2019-01-17 RX ADMIN — LIDOCAINE HYDROCHLORIDE 1 ML: 10 INJECTION, SOLUTION EPIDURAL; INFILTRATION; INTRACAUDAL; PERINEURAL at 11:43

## 2019-01-17 RX ADMIN — MIDAZOLAM 2 MG: 1 INJECTION INTRAMUSCULAR; INTRAVENOUS at 12:13

## 2019-01-17 RX ADMIN — PROPOFOL 200 MG: 10 INJECTION, EMULSION INTRAVENOUS at 12:20

## 2019-01-17 RX ADMIN — SODIUM CHLORIDE, SODIUM LACTATE, POTASSIUM CHLORIDE, AND CALCIUM CHLORIDE: 600; 310; 30; 20 INJECTION, SOLUTION INTRAVENOUS at 11:42

## 2019-01-17 RX ADMIN — FENTANYL CITRATE 100 MCG: 50 INJECTION INTRAMUSCULAR; INTRAVENOUS at 12:36

## 2019-01-17 ASSESSMENT — PAIN DESCRIPTION - DESCRIPTORS: DESCRIPTORS: BURNING

## 2019-01-17 ASSESSMENT — PAIN DESCRIPTION - FREQUENCY: FREQUENCY: CONTINUOUS

## 2019-01-17 ASSESSMENT — PAIN DESCRIPTION - ONSET: ONSET: OTHER (COMMENT)

## 2019-01-17 ASSESSMENT — PAIN SCALES - GENERAL
PAINLEVEL_OUTOF10: 6
PAINLEVEL_OUTOF10: 8
PAINLEVEL_OUTOF10: 0
PAINLEVEL_OUTOF10: 7

## 2019-01-17 ASSESSMENT — PAIN DESCRIPTION - PAIN TYPE
TYPE: SURGICAL PAIN
TYPE: SURGICAL PAIN

## 2019-01-17 ASSESSMENT — PAIN DESCRIPTION - LOCATION: LOCATION: KNEE

## 2019-01-17 ASSESSMENT — PAIN - FUNCTIONAL ASSESSMENT: PAIN_FUNCTIONAL_ASSESSMENT: 0-10

## 2019-01-17 ASSESSMENT — PAIN DESCRIPTION - ORIENTATION: ORIENTATION: LEFT

## 2019-01-25 ENCOUNTER — OFFICE VISIT (OUTPATIENT)
Dept: PSYCHIATRY | Age: 39
End: 2019-01-25
Payer: COMMERCIAL

## 2019-01-25 VITALS
HEART RATE: 88 BPM | HEIGHT: 62 IN | OXYGEN SATURATION: 98 % | BODY MASS INDEX: 39.56 KG/M2 | WEIGHT: 215 LBS | SYSTOLIC BLOOD PRESSURE: 130 MMHG | DIASTOLIC BLOOD PRESSURE: 93 MMHG

## 2019-01-25 DIAGNOSIS — F33.1 MODERATE RECURRENT MAJOR DEPRESSION (HCC): ICD-10-CM

## 2019-01-25 DIAGNOSIS — F41.9 ANXIETY: ICD-10-CM

## 2019-01-25 PROCEDURE — 99213 OFFICE O/P EST LOW 20 MIN: CPT | Performed by: CLINICAL NURSE SPECIALIST

## 2019-01-25 RX ORDER — PROPRANOLOL HYDROCHLORIDE 20 MG/1
20 TABLET ORAL 3 TIMES DAILY PRN
Qty: 90 TABLET | Refills: 2 | Status: SHIPPED | OUTPATIENT
Start: 2019-01-25 | End: 2019-04-25 | Stop reason: SDUPTHER

## 2019-01-25 RX ORDER — DULOXETIN HYDROCHLORIDE 60 MG/1
60 CAPSULE, DELAYED RELEASE ORAL 2 TIMES DAILY
Qty: 60 CAPSULE | Refills: 2 | Status: SHIPPED | OUTPATIENT
Start: 2019-01-25 | End: 2019-04-25 | Stop reason: SDUPTHER

## 2019-02-05 ENCOUNTER — TRANSCRIBE ORDERS (OUTPATIENT)
Dept: PHYSICAL THERAPY | Facility: HOSPITAL | Age: 39
End: 2019-02-05

## 2019-02-05 ENCOUNTER — HOSPITAL ENCOUNTER (OUTPATIENT)
Dept: PHYSICAL THERAPY | Facility: HOSPITAL | Age: 39
Setting detail: THERAPIES SERIES
Discharge: HOME | End: 2019-02-05

## 2019-02-05 DIAGNOSIS — S83.242A ACUTE MEDIAL MENISCUS TEAR OF LEFT KNEE, INITIAL ENCOUNTER: Primary | ICD-10-CM

## 2019-02-05 DIAGNOSIS — Z98.890 S/P LEFT KNEE ARTHROSCOPY: ICD-10-CM

## 2019-02-05 PROCEDURE — 97161 PT EVAL LOW COMPLEX 20 MIN: CPT | Performed by: PHYSICAL THERAPIST

## 2019-02-05 PROCEDURE — 97110 THERAPEUTIC EXERCISES: CPT | Performed by: PHYSICAL THERAPIST

## 2019-02-06 NOTE — THERAPY EVALUATION
"    Outpatient Physical Therapy Ortho Initial Evaluation  [facility]     Patient Name: Cathi Krishnan  : 1980  MRN: [id number]  Today's Date: 2019      Visit Date: 2019     Subjective Improvement: N/A      Attendance:   Approved:   30 visits        MD follow up:   3/1/19         date:    19       Patient Active Problem List   Diagnosis   • Obesity, Class III, BMI 40-49.9 (morbid obesity) (CMS/[facility])   • GERD (gastroesophageal reflux disease)   • Gastroesophageal reflux disease   • H. pylori infection   • Status post laparoscopic sleeve gastrectomy        Past Medical History:   Diagnosis Date   • Ankle swelling    • Anxiety    • Arthritis    • Back pain    • Constipation    • Depression    • Excessive thirst    • GERD (gastroesophageal reflux disease)    • Heartburn    • History of attempted suicide     age 13 years old    • Leg cramps     with walking   • Loss of bladder control leaking urine   • Pain     Shoulder, neck, knee,back     • Rash     in skin folds    • Rheumatoid arthritis (CMS/HCC)     \"starting\" was mild case per Dr. Pelaez   • Varicose veins of both lower extremities    • Wears glasses         Past Surgical History:   Procedure Laterality Date   • CHOLECYSTECTOMY      lap   • ENDOSCOPY     • ENDOSCOPY N/A 2018    Procedure: ESOPHAGOGASTRODUODENOSCOPY WITH ANESTHESIA;  Surgeon: Julio C Rodriguez MD;  Location: [facility] ENDOSCOPY;  Service: General   • ESSURE TUBAL LIGATION       &     • GASTRIC SLEEVE LAPAROSCOPIC N/A 2018    Procedure: GASTRIC SLEEVE LAPAROSCOPIC;  Surgeon: Julio C Rodriguez MD;  Location: [facility] OR;  Service: Bariatric   • HERNIA REPAIR Left     inguinal; without mesh    • TEETH EXTRACTION     • TONSILLECTOMY       Allergies   Allergen Reactions   • Asa [Aspirin] Shortness Of Breath and Swelling   • Cortisone Shortness Of Breath and Swelling   • Penicillins Hives and Shortness Of Breath     \"All Cillins\"   • Prednisone " Shortness Of Breath and Swelling   • Zantac [Ranitidine Hcl] Shortness Of Breath and Swelling   • Ibuprofen Hives       Current Outpatient Medications:   •  calcium citrate-vitamin d (CALCITRATE) 315-250 MG-UNIT tablet tablet, Take  by mouth Daily., Disp: , Rfl:   •  docusate sodium (COLACE) 100 MG capsule, Take 100 mg by mouth Daily., Disp: , Rfl:   •  DULoxetine (CYMBALTA) 60 MG capsule, Take 60 mg by mouth 2 (Two) Times a Day., Disp: , Rfl:   •  ferrous sulfate 325 (65 FE) MG tablet, Take 325 mg by mouth Daily With Breakfast., Disp: , Rfl:   •  Multiple Vitamins-Minerals (MULTIVITAMIN ADULT PO), Take  by mouth., Disp: , Rfl:   •  pantoprazole (PROTONIX) 40 MG EC tablet, Take 1 tablet by mouth Daily., Disp: 30 tablet, Rfl: 1  •  propranolol (INDERAL) 20 MG tablet, Take 20 mg by mouth 3 (Three) Times a Day As Needed (panic and anxiety)., Disp: , Rfl:   •  vitamin D (ERGOCALCIFEROL) 74340 UNITS capsule capsule, Take 50,000 Units by mouth Every 7 (Seven) Days. Tuesday, Disp: , Rfl:   - B12  - Tramadol 50 mg; 4x/day      Visit Dx:     ICD-10-CM ICD-9-CM   1. Acute medial meniscus tear of left knee, initial encounter S83.242A 836.0   2. S/P left knee arthroscopy Z98.890 V45.89       Patient History     Row Name 02/05/19 1300             History    Chief Complaint  Pain;Muscle weakness;Tightness;Difficulty Walking  -KG      Type of Pain  Knee pain  -KG      Date Current Problem(s) Began  01/25/19  -KG      Brief Description of Current Complaint  Pt presents s/p L knee meniscectomy on 1/25/19. Pt states she's not sure how she injured her knee prior to the surgery, had been bothering her for a couple months and getting worse. Pt lives with her  in a single level home. Has 4 steps to enter home. Pt reports has been using a cane sometimes in the house but doesn't use it much.  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  -KG         Pain     Pain Location  Knee  -KG      Pain at Present  7  -KG      Pain at Best  0  -KG      Pain at Worst  10  -KG      Pain Frequency  Constant/continuous;Several days a week  -KG      Pain Description  Aching;Sharp  -KG      What Performance Factors Make the Current Problem(s) WORSE?  Bending knee, walking for short periods, standing short periods, managing stairs  -KG      What Performance Factors Make the Current Problem(s) BETTER?  Ice  -KG      Pain Comments  Increased pain with increased weightbearing activities  -KG      Tolerance Time- Standing  Short periods  -KG      Tolerance Time- Sitting  Short periods  -KG      Tolerance Time- Walking  Short periods  -KG      Tolerance Time- Lying  Difficulty finding a comfortable position.  -KG      Is your sleep disturbed?  Yes  -KG      Difficulties at work?  N/A  -KG      Difficulties with ADL's?  Independent but difficulty getting sock/shoe on  -KG      Difficulties with recreational activities?  Unable to walk for exercise at this time.  -KG        User Key  (r) = Recorded By, (t) = Taken By, (c) = Cosigned By    Initials Name Provider Type    KG Marjorie Toscano, PT Physical Therapist          PT Ortho     Row Name 02/05/19 1300       Subjective Comments    Subjective Comments  Refer to therapy pt history for details.  -KG       Precautions and Contraindications    Precautions/Limitations  no known precautions/limitations  -KG       Subjective Pain    Able to rate subjective pain?  yes  -KG    Pre-Treatment Pain Level  7  -KG    Post-Treatment Pain Level  7  -KG    Subjective Pain Comment  \"sore\" post treatment  -KG       Posture/Observations    Posture/Observations Comments  No acute distress. Pt ambulates with no AD this date, antalgic gait LLE with decreased stance time " present. Mild edema present at L knee compared to R knee. Incisions have healed well at this time.  -KG       Special Tests/Palpation    Special Tests/Palpation  Knee  -KG       Knee Palpation    Knee Palpation?  Yes  -KG    Patella  Left:;Tender Superiomedial  -KG    Medial Joint Line  Left:;Tender  -KG    Quads  Left:;Tender  -KG    Semimembranosis  Left:;Tender  -KG    Semitendinosis  Left:;Tender;Guarded/taut  -KG       Patellar Accessory Motions    Patellar Accessory Motions Tested?  Yes Mild crepitus noted  -KG    Superior glide  Left:;Hypomobile  -KG    Inferior glide  Left:;Hypomobile  -KG    Medial glide  Left:;Hypomobile  -KG    Lateral glide  Left:;Hypomobile  -KG       General ROM    RT Lower Ext  Rt Knee Extension/Flexion  -KG    LT Lower Ext  Lt Knee Extension/Flexion  -KG       Right Lower Ext    Rt Knee Extension/Flexion AROM  5-0-138  -KG       Left Lower Ext    Lt Knee Extension/Flexion AROM  4-0- 97; 103 AA  -KG       MMT (Manual Muscle Testing)    Rt Lower Ext  Rt Hip Flexion;Rt Hip ABduction;Rt Hip ADduction;Rt Knee Extension;Rt Knee Flexion  -KG    Lt Lower Ext  Lt Hip Flexion;Lt Hip ABduction;Lt Hip ADduction;Lt Knee Extension;Lt Knee Flexion  -KG       MMT Right Lower Ext    Rt Hip Flexion MMT, Gross Movement  (5/5) normal  -KG    Rt Hip ABduction MMT, Gross Movement  (5/5) normal  -KG    Rt Hip ADduction MMT, Gross Movement  (5/5) normal  -KG    Rt Knee Extension MMT, Gross Movement  (5/5) normal  -KG    Rt Knee Flexion MMT, Gross Movement  (5/5) normal  -KG       MMT Left Lower Ext    Lt Hip Flexion MMT, Gross Movement  (4-/5) good minus  -KG    Lt Hip ABduction MMT, Gross Movement  (4-/5) good minus  -KG    Lt Hip ADduction MMT, Gross Movement  (4/5) good  -KG    Lt Knee Extension MMT, Gross Movement  (4-/5) good minus  -KG    Lt Knee Flexion MMT, Gross Movement  (4-/5) good minus  -KG    Lt Lower Extremity Comments   No quad lag with active SLR this date when cued for proper contraction   "-KG       Sensation    Sensation WNL?  WNL  -KG    Light Touch  No apparent deficits  -KG       Flexibility    Flexibility Tested?  Lower Extremity  -KG       Lower Extremity Flexibility    Hamstrings  Left:;Mildly limited  -KG    Quadriceps  Left:;Moderately limited  -KG    Gastrocnemius  Left:;Mildly limited  -KG       Girth    Girth Measured?  Right Lower Extremity;Left Lower Extremity  -KG       Balance Skills Training    SLS  R SLS 24\"; L SLS deferred; unable  -KG       Gait/Stairs Assessment/Training    Comment (Gait/Stairs)  Manages 5 steps x3 with step to pattern utilizing handrails. Educated on descending leading with non-op RLE.  -KG      User Key  (r) = Recorded By, (t) = Taken By, (c) = Cosigned By    Initials Name Provider Type    Marjorie Barnes, PT Physical Therapist                      Therapy Education  Education Details: POC education. Anatomy education related to condition. HEP: QS, SAQ, SLR flex, AA heel slides, gastroc stretch, LS hamstring stretch, patella mobilizations. Educated on using cane to avoid limping and decreasing pain, maria fernanda if going to be ambulating for longer distances.  Given: HEP, Symptoms/condition management, Pain management, Posture/body mechanics, Mobility training, Edema management  Program: New  How Provided: Verbal, Demonstration, Written  Provided to: Patient  Level of Understanding: Teach back education performed, Verbalized, Demonstrated     PT OP Goals     Row Name 02/05/19 1300          PT Short Term Goals    STG Date to Achieve  02/26/19  -KG     STG 1  Demonstrate independence/compliance with HEP  -KG     STG 1 Progress  New  -KG     STG 2  Tolerate 45 minute treatment session without increased pain  -KG     STG 2 Progress  New  -KG     STG 3  Demonstrate L knee flex AROM to 120 deg or greater  -KG     STG 3 Progress  New  -KG     STG 4  Ambulate independently with no AD, non-antalgic gait present  -KG     STG 4 Progress  New  -KG        Long Term Goals    LTG " "Date to Achieve  03/19/19  -KG     LTG 1  Subjectively report 60% improvement or greater  -KG     LTG 1 Progress  New  -KG     LTG 2  Improve LEFS score to 45/80 or greater  -KG     LTG 2 Progress  New  -KG     LTG 3  Demonstrate L knee flex/ext MMT to 4+/5  -KG     LTG 3 Progress  New  -KG     LTG 4  Demonstrate L hip flex/abd MMT to 4+/5  -KG     LTG 4 Progress  New  -KG     LTG 5  Perform R SLS for 20\" without LOB, no increased pain  -KG     LTG 5 Progress  New  -KG     LTG 6  Ascend/descend 5 steps x 3, reciprocal pattern, no compensation noted  -KG     LTG 6 Progress  New  -KG     LTG 7  Return to walking for 15 min daily/3x week without increased pain  -KG     LTG 7 Progress  New  -KG        Time Calculation    PT Goal Re-Cert Due Date  02/26/19  -KG       User Key  (r) = Recorded By, (t) = Taken By, (c) = Cosigned By    Initials Name Provider Type    KG Marjorie Toscano, PT Physical Therapist          PT Assessment/Plan     Row Name 02/05/19 1300          PT Assessment    Functional Limitations  Decreased safety during functional activities;Impaired gait;Impaired locomotion;Limitation in home management;Limitations in community activities;Performance in leisure activities;Performance in self-care ADL  -KG     Impairments  Balance;Gait;Impaired flexibility;Impaired muscle endurance;Muscle strength;Pain;Range of motion  -KG     Assessment Comments  Pt is a 38 y.o. female presenting s/p L medial meniscectomy on 1/25/19. Pt demonstrates an overall decrease in L knee ROM, strength, & stability which is limiting performance of functional mobility at this time. Once educated/cued on proper quad contraction, pt able to perform SLR with no quad lag but does fatigue eccentrically. Pt will benefit from skilled PT services to address these deficits for improvements in overall functional strength/enurance, LLE stability, gait mechanics, and overall tolerance to daily functional activities.  -KG     Please refer to paper " survey for additional self-reported information  Yes  -KG     Rehab Potential  Good  -KG     Patient/caregiver participated in establishment of treatment plan and goals  Yes  -KG     Patient would benefit from skilled therapy intervention  Yes  -KG        PT Plan    PT Frequency  2x/week;3x/week  -KG     Predicted Duration of Therapy Intervention (Therapy Eval)  4-6 weeks  -KG     Planned CPT's?  PT EVAL LOW COMPLEXITY: 17636;PT RE-EVAL: 21807;PT THER PROC EA 15 MIN: 71057;PT THER ACT EA 15 MIN: 29316;PT MANUAL THERAPY EA 15 MIN: 58322;PT NEUROMUSC RE-EDUCATION EA 15 MIN: 51658;PT SELF CARE/HOME MGMT/TRAIN EA 15: 34720;PT ELECTRICAL STIM UNATTEND: ;PT THER SUPP EA 15 MIN  -KG     Physical Therapy Interventions (Optional Details)  balance training;gait training;home exercise program;joint mobilization;manual therapy techniques;modalities;neuromuscular re-education;patient/family education;ROM (Range of Motion);strengthening;stretching  -KG     PT Plan Comments  Focus on overall quad/knee stability & strengthening, knee ROM, LE stretching, patella mobilizations, manual prn, balance/proprioception, gait training, modalities prn for pain.  -KG       User Key  (r) = Recorded By, (t) = Taken By, (c) = Cosigned By    Initials Name Provider Type    Marjorie Barnes, PT Physical Therapist          Modalities     Row Name 02/05/19 1300             Ice    Patient reports will apply ice at home to involved area  Yes Given ice bag to go  -KG        User Key  (r) = Recorded By, (t) = Taken By, (c) = Cosigned By    Initials Name Provider Type    Marjorie Barnes, PT Physical Therapist        Exercises     Row Name 02/05/19 1300             Precautions    Existing Precautions/Restrictions  no known precautions/restrictions  -KG         Subjective Comments    Subjective Comments  Refer to therapy pt history for details.  -KG         Subjective Pain    Able to rate subjective pain?  yes  -KG      Pre-Treatment Pain Level  " 7  -KG      Post-Treatment Pain Level  7  -KG      Subjective Pain Comment  \"sore\" post treatment  -KG         Aquatics    Aquatics performed?  No  -KG         Exercise 1    Exercise Name 1  Quad sets  -KG      Cueing 1  Verbal  -KG      Sets 1  2  -KG      Reps 1  10  -KG      Time 1  5\" hold  -KG         Exercise 2    Exercise Name 2  SAQ  -KG      Cueing 2  Verbal  -KG      Sets 2  2  -KG      Reps 2  10  -KG         Exercise 3    Exercise Name 3  Supine SLR flex  -KG      Cueing 3  Verbal  -KG      Sets 3  1  -KG      Reps 3  10  -KG         Exercise 4    Exercise Name 4  AA heel slides  -KG      Cueing 4  Verbal  -KG      Sets 4  1  -KG      Reps 4  15  -KG         Exercise 5    Exercise Name 5  Gastroc stretch with strap  -KG      Cueing 5  Verbal  -KG      Reps 5  2  -KG      Time 5  30\" hold  -KG         Exercise 6    Exercise Name 6  Long sitting hamstring stretch  -KG      Cueing 6  Verbal  -KG      Reps 6  2  -KG      Time 6  30\" hold  -KG         Exercise 7    Exercise Name 7  Stair negotiation  -KG      Cueing 7  Verbal  -KG      Sets 7  1  -KG      Reps 7  5  -KG      Additional Comments  Training steps; step to pattern  -KG        User Key  (r) = Recorded By, (t) = Taken By, (c) = Cosigned By    Initials Name Provider Type    Marjorie Barnes, PT Physical Therapist           Manual Rx (last 36 hours)      Manual Treatments     Row Name 02/05/19 1300             Manual Rx 1    Manual Rx 1 Location  L patella; medial hamstring  -KG      Manual Rx 1 Type  Patella mobilizations; STM/MFR  -KG      Manual Rx 1 Duration  5 min  -KG        User Key  (r) = Recorded By, (t) = Taken By, (c) = Cosigned By    Initials Name Provider Type    Marjorie Barnes, PT Physical Therapist                      Outcome Measure Options: Lower Extremity Functional Scale (LEFS)  Lower Extremity Functional Index  Any of your usual work, housework or school activities: Moderate difficulty  Your usual hobbies, recreational " or sporting activities: Extreme difficulty or unable to perform activity  Getting into or out of the bath: Quite a bit of difficulty  Walking between rooms: Quite a bit of difficulty  Putting on your shoes or socks: Moderate difficulty  Squatting: Extreme difficulty or unable to perform activity  Lifting an object, like a bag of groceries from the floor: No difficulty  Performing light activities around your home: Moderate difficulty  Performing heavy activities around your home: Extreme difficulty or unable to perform activity  Getting into or out of a car: Extreme difficulty or unable to perform activity  Walking 2 blocks: Extreme difficulty or unable to perform activity  Walking a mile: Extreme difficulty or unable to perform activity  Going up or down 10 stairs (about 1 flight of stairs): Extreme difficulty or unable to perform activity  Standing for 1 hour: Extreme difficulty or unable to perform activity  Sitting for 1 hour: Extreme difficulty or unable to perform activity  Running on even ground: Extreme difficulty or unable to perform activity  Running on uneven ground: Extreme difficulty or unable to perform activity  Making sharp turns while running fast: Extreme difficulty or unable to perform activity  Hopping: Extreme difficulty or unable to perform activity  Rolling over in bed: Extreme difficulty or unable to perform activity  Total: 12      Time Calculation:     Therapy Suggested Charges     Code   Minutes Charges    None             Start Time: 1344  Stop Time: 1430  Time Calculation (min): 46 min  Total Timed Code Minutes- PT: 25 minute(s)     Therapy Charges for Today     Code Description Service Date Service Provider Modifiers Qty    04249327001 HC PT EVAL LOW COMPLEXITY 1 2/5/2019 Marjorie Toscano, PT GP 1    00196626205 HC PT THER PROC EA 15 MIN 2/5/2019 Marjorie Toscano, PT GP 2          PT G-Codes  Outcome Measure Options: Lower Extremity Functional Scale (LEFS)  Total: 12         Marjorie CAMARILLO  [place], PT  2/5/2019

## 2019-02-07 ENCOUNTER — HOSPITAL ENCOUNTER (OUTPATIENT)
Dept: PHYSICAL THERAPY | Facility: HOSPITAL | Age: 39
Setting detail: THERAPIES SERIES
Discharge: HOME | End: 2019-02-07

## 2019-02-07 DIAGNOSIS — S83.242A ACUTE MEDIAL MENISCUS TEAR OF LEFT KNEE, INITIAL ENCOUNTER: Primary | ICD-10-CM

## 2019-02-07 DIAGNOSIS — Z98.890 S/P LEFT KNEE ARTHROSCOPY: ICD-10-CM

## 2019-02-07 PROCEDURE — 97110 THERAPEUTIC EXERCISES: CPT

## 2019-02-07 NOTE — THERAPY TREATMENT NOTE
"    Outpatient Physical Therapy Ortho Treatment Note  [facility]     Patient Name: Cathi Krishnan  : 1980  MRN: [id number]  Today's Date: 2019      Visit Date: 2019  Subjective Improvement: N/A      Attendance:   Approved:   30 visits        MD follow up:   3/1/19         date:    19      Visit Dx:    ICD-10-CM ICD-9-CM   1. Acute medial meniscus tear of left knee, initial encounter S83.242A 836.0   2. S/P left knee arthroscopy Z98.890 V45.89       Patient Active Problem List   Diagnosis   • Obesity, Class III, BMI 40-49.9 (morbid obesity) (CMS/[facility])   • GERD (gastroesophageal reflux disease)   • Gastroesophageal reflux disease   • H. pylori infection   • Status post laparoscopic sleeve gastrectomy        Past Medical History:   Diagnosis Date   • Ankle swelling    • Anxiety    • Arthritis    • Back pain    • Constipation    • Depression    • Excessive thirst    • GERD (gastroesophageal reflux disease)    • Heartburn    • History of attempted suicide     age 13 years old    • Leg cramps     with walking   • Loss of bladder control leaking urine   • Pain     Shoulder, neck, knee,back     • Rash     in skin folds    • Rheumatoid arthritis (CMS/[facility])     \"starting\" was mild case per Dr. Pelaez   • Varicose veins of both lower extremities    • Wears glasses         Past Surgical History:   Procedure Laterality Date   • CHOLECYSTECTOMY      lap   • ENDOSCOPY     • ENDOSCOPY N/A 2018    Procedure: ESOPHAGOGASTRODUODENOSCOPY WITH ANESTHESIA;  Surgeon: Julio C Rodriguez MD;  Location: [facility] ENDOSCOPY;  Service: General   • ESSURE TUBAL LIGATION       &     • GASTRIC SLEEVE LAPAROSCOPIC N/A 2018    Procedure: GASTRIC SLEEVE LAPAROSCOPIC;  Surgeon: Julio C Rodriguez MD;  Location: [facility] OR;  Service: Bariatric   • HERNIA REPAIR Left     inguinal; without mesh    • TEETH EXTRACTION     • TONSILLECTOMY         PT Ortho     Row Name 19 1300       " "Subjective Comments    Subjective Comments  Doing HEP; knee sore.  -PM       Precautions and Contraindications    Precautions/Limitations  no known precautions/limitations  -PM       Subjective Pain    Able to rate subjective pain?  yes  -PM    Pre-Treatment Pain Level  6  -PM    Post-Treatment Pain Level  3  -PM       Left Lower Ext    Lt Knee Extension/Flexion AROM  0-106 AROM; AA flex 112  -PM    Row Name 02/05/19 1300       Subjective Comments    Subjective Comments  Refer to therapy pt history for details.  -KG       Precautions and Contraindications    Precautions/Limitations  no known precautions/limitations  -KG       Subjective Pain    Able to rate subjective pain?  yes  -KG    Pre-Treatment Pain Level  7  -KG    Post-Treatment Pain Level  7  -KG    Subjective Pain Comment  \"sore\" post treatment  -KG       Posture/Observations    Posture/Observations Comments  No acute distress. Pt ambulates with no AD this date, antalgic gait LLE with decreased stance time present. Mild edema present at L knee compared to R knee. Incisions have healed well at this time.  -KG       Special Tests/Palpation    Special Tests/Palpation  Knee  -KG       Knee Palpation    Knee Palpation?  Yes  -KG    Patella  Left:;Tender Superiomedial  -KG    Medial Joint Line  Left:;Tender  -KG    Quads  Left:;Tender  -KG    Semimembranosis  Left:;Tender  -KG    Semitendinosis  Left:;Tender;Guarded/taut  -KG       Patellar Accessory Motions    Patellar Accessory Motions Tested?  Yes Mild crepitus noted  -KG    Superior glide  Left:;Hypomobile  -KG    Inferior glide  Left:;Hypomobile  -KG    Medial glide  Left:;Hypomobile  -KG    Lateral glide  Left:;Hypomobile  -KG       General ROM    RT Lower Ext  Rt Knee Extension/Flexion  -KG    LT Lower Ext  Lt Knee Extension/Flexion  -KG       Right Lower Ext    Rt Knee Extension/Flexion AROM  5-0-138  -KG       Left Lower Ext    Lt Knee Extension/Flexion AROM  4-0- 97; 103 AA  -KG       MMT (Manual " "Muscle Testing)    Rt Lower Ext  Rt Hip Flexion;Rt Hip ABduction;Rt Hip ADduction;Rt Knee Extension;Rt Knee Flexion  -KG    Lt Lower Ext  Lt Hip Flexion;Lt Hip ABduction;Lt Hip ADduction;Lt Knee Extension;Lt Knee Flexion  -KG       MMT Right Lower Ext    Rt Hip Flexion MMT, Gross Movement  (5/5) normal  -KG    Rt Hip ABduction MMT, Gross Movement  (5/5) normal  -KG    Rt Hip ADduction MMT, Gross Movement  (5/5) normal  -KG    Rt Knee Extension MMT, Gross Movement  (5/5) normal  -KG    Rt Knee Flexion MMT, Gross Movement  (5/5) normal  -KG       MMT Left Lower Ext    Lt Hip Flexion MMT, Gross Movement  (4-/5) good minus  -KG    Lt Hip ABduction MMT, Gross Movement  (4-/5) good minus  -KG    Lt Hip ADduction MMT, Gross Movement  (4/5) good  -KG    Lt Knee Extension MMT, Gross Movement  (4-/5) good minus  -KG    Lt Knee Flexion MMT, Gross Movement  (4-/5) good minus  -KG    Lt Lower Extremity Comments   No quad lag with active SLR this date when cued for proper contraction  -KG       Sensation    Sensation WNL?  WNL  -KG    Light Touch  No apparent deficits  -KG       Flexibility    Flexibility Tested?  Lower Extremity  -KG       Lower Extremity Flexibility    Hamstrings  Left:;Mildly limited  -KG    Quadriceps  Left:;Moderately limited  -KG    Gastrocnemius  Left:;Mildly limited  -KG       Girth    Girth Measured?  Right Lower Extremity;Left Lower Extremity  -KG       Balance Skills Training    SLS  R SLS 24\"; L SLS deferred; unable  -KG       Gait/Stairs Assessment/Training    Comment (Gait/Stairs)  Manages 5 steps x3 with step to pattern utilizing handrails. Educated on descending leading with non-op RLE.  -KG      User Key  (r) = Recorded By, (t) = Taken By, (c) = Cosigned By    Initials Name Provider Type    Ai Bravo, PTA Physical Therapy Assistant    KG Marjorie Toscano, PT Physical Therapist                      PT Assessment/Plan     Row Name 02/07/19 1300          PT Assessment    Assessment " "Comments  Incr ROM; mary anne advancements well. Good effort.  -PM        PT Plan    PT Frequency  2x/week;3x/week  -PM     Predicted Duration of Therapy Intervention (Therapy Eval)  4-6 wks  -PM     PT Plan Comments  Tband TKE; possible MS  -PM       User Key  (r) = Recorded By, (t) = Taken By, (c) = Cosigned By    Initials Name Provider Type    PM Ai Mauro PTA Physical Therapy Assistant          Modalities     Row Name 02/07/19 1300             Ice    Patient reports will apply ice at home to involved area  Yes ice to go  -PM        User Key  (r) = Recorded By, (t) = Taken By, (c) = Cosigned By    Initials Name Provider Type    PM Ai Mauro PTA Physical Therapy Assistant          Exercises     Row Name 02/07/19 1300             Precautions    Existing Precautions/Restrictions  no known precautions/restrictions  -PM         Subjective Comments    Subjective Comments  Doing HEP; knee sore.  -PM         Subjective Pain    Able to rate subjective pain?  yes  -PM      Pre-Treatment Pain Level  6  -PM      Post-Treatment Pain Level  3  -PM         Aquatics    Aquatics performed?  No  -PM         Exercise 1    Exercise Name 1  Quad sets  -PM      Cueing 1  Verbal  -PM      Sets 1  2  -PM      Reps 1  10  -PM      Time 1  5\" hold  -PM         Exercise 2    Exercise Name 2  SAQ  -PM      Cueing 2  Verbal  -PM      Sets 2  2  -PM      Reps 2  10  -PM         Exercise 3    Exercise Name 3  Supine SLR flex  -PM      Cueing 3  Verbal  -PM      Sets 3  2  -PM      Reps 3  10  -PM         Exercise 4    Exercise Name 4  AA heel slides  -PM      Cueing 4  Verbal  -PM      Sets 4  2  -PM      Reps 4  10  -PM         Exercise 5    Exercise Name 5  Gastroc stretch with strap  -PM      Cueing 5  Verbal  -PM      Reps 5  2  -PM      Time 5  30\" hold  -PM         Exercise 6    Exercise Name 6  Long sitting hamstring stretch  -PM      Cueing 6  Verbal  -PM      Reps 6  2  -PM      Time 6  30\" hold  -PM         Exercise 7 " "   Exercise Name 7  SL hip abd SLR  -PM      Cueing 7  Verbal  -PM      Sets 7  2  -PM      Reps 7  10  -PM         Exercise 8    Exercise Name 8  Pro II  -PM      Time 8  10  -PM        User Key  (r) = Recorded By, (t) = Taken By, (c) = Cosigned By    Initials Name Provider Type    PM Ai Mauro PTA Physical Therapy Assistant                        Manual Rx (last 36 hours)      Manual Treatments     Row Name 02/07/19 1300             Manual Rx 1    Manual Rx 1 Location  L knee patella and medial ant knee where sore  -PM      Manual Rx 1 Type  Patella mobilizations; STM/MFR  -PM      Manual Rx 1 Duration  5 min  -PM        User Key  (r) = Recorded By, (t) = Taken By, (c) = Cosigned By    Initials Name Provider Type    PM Ai Mauro PTA Physical Therapy Assistant          PT OP Goals     Row Name 02/07/19 1300          PT Short Term Goals    STG Date to Achieve  02/26/19  -PM     STG 1  Demonstrate independence/compliance with HEP  -PM     STG 1 Progress  Ongoing  -PM     STG 2  Tolerate 45 minute treatment session without increased pain  -PM     STG 2 Progress  Ongoing  -PM     STG 3  Demonstrate L knee flex AROM to 120 deg or greater  -PM     STG 3 Progress  Ongoing  -PM     STG 4  Ambulate independently with no AD, non-antalgic gait present  -PM     STG 4 Progress  Ongoing  -PM        Long Term Goals    LTG Date to Achieve  03/19/19  -PM     LTG 1  Subjectively report 60% improvement or greater  -PM     LTG 1 Progress  Ongoing  -PM     LTG 2  Improve LEFS score to 45/80 or greater  -PM     LTG 2 Progress  Ongoing  -PM     LTG 3  Demonstrate L knee flex/ext MMT to 4+/5  -PM     LTG 3 Progress  Ongoing  -PM     LTG 4  Demonstrate L hip flex/abd MMT to 4+/5  -PM     LTG 4 Progress  Ongoing  -PM     LTG 5  Perform R SLS for 20\" without LOB, no increased pain  -PM     LTG 5 Progress  Ongoing  -PM     LTG 6  Ascend/descend 5 steps x 3, reciprocal pattern, no compensation noted  -PM     LTG 6 " Progress  Ongoing  -PM     LTG 7  Return to walking for 15 min daily/3x week without increased pain  -PM     LTG 7 Progress  Ongoing  -PM        Time Calculation    PT Goal Re-Cert Due Date  02/26/19  -PM       User Key  (r) = Recorded By, (t) = Taken By, (c) = Cosigned By    Initials Name Provider Type    PM Ai Mauro PTA Physical Therapy Assistant          Therapy Education  Education Details: SL abd SLR  Given: HEP, Symptoms/condition management, Pain management, Posture/body mechanics, Mobility training, Edema management  Program: Reinforced, Progressed  How Provided: Verbal, Demonstration, Written  Provided to: Patient  Level of Understanding: Teach back education performed, Verbalized, Demonstrated              Time Calculation:   Start Time: 1300  Stop Time: 1345  Time Calculation (min): 45 min  Total Timed Code Minutes- PT: 45 minute(s)  Therapy Suggested Charges     Code   Minutes Charges    None           Therapy Charges for Today     Code Description Service Date Service Provider Modifiers Qty    63610168349 HC PT THER PROC EA 15 MIN 2/7/2019 Ai Mauro PTA GP 3                    Ai Mauro PTA  2/7/2019

## 2019-02-13 ENCOUNTER — HOSPITAL ENCOUNTER (OUTPATIENT)
Dept: PHYSICAL THERAPY | Facility: HOSPITAL | Age: 39
Setting detail: THERAPIES SERIES
Discharge: HOME | End: 2019-02-13

## 2019-02-13 DIAGNOSIS — Z98.890 S/P LEFT KNEE ARTHROSCOPY: ICD-10-CM

## 2019-02-13 DIAGNOSIS — S83.242A ACUTE MEDIAL MENISCUS TEAR OF LEFT KNEE, INITIAL ENCOUNTER: Primary | ICD-10-CM

## 2019-02-13 PROCEDURE — 97110 THERAPEUTIC EXERCISES: CPT

## 2019-02-13 NOTE — THERAPY TREATMENT NOTE
"    Outpatient Physical Therapy Ortho Treatment Note  [facility]     Patient Name: Cathi Krishnan  : 1980  MRN: [id number]  Today's Date: 2019      Visit Date: 2019  Subjective Improvement: N/A      Attendance: 3/3  Approved:   30 visits        MD follow up:   3/1/19         date:    19      Visit Dx:    ICD-10-CM ICD-9-CM   1. Acute medial meniscus tear of left knee, initial encounter S83.242A 836.0   2. S/P left knee arthroscopy Z98.890 V45.89       Patient Active Problem List   Diagnosis   • Obesity, Class III, BMI 40-49.9 (morbid obesity) (CMS/[facility])   • GERD (gastroesophageal reflux disease)   • Gastroesophageal reflux disease   • H. pylori infection   • Status post laparoscopic sleeve gastrectomy        Past Medical History:   Diagnosis Date   • Ankle swelling    • Anxiety    • Arthritis    • Back pain    • Constipation    • Depression    • Excessive thirst    • GERD (gastroesophageal reflux disease)    • Heartburn    • History of attempted suicide     age 13 years old    • Leg cramps     with walking   • Loss of bladder control leaking urine   • Pain     Shoulder, neck, knee,back     • Rash     in skin folds    • Rheumatoid arthritis (CMS/[facility])     \"starting\" was mild case per Dr. Pelaez   • Varicose veins of both lower extremities    • Wears glasses         Past Surgical History:   Procedure Laterality Date   • CHOLECYSTECTOMY      lap   • ENDOSCOPY     • ENDOSCOPY N/A 2018    Procedure: ESOPHAGOGASTRODUODENOSCOPY WITH ANESTHESIA;  Surgeon: Julio C Rodriguez MD;  Location:  [facility] ENDOSCOPY;  Service: General   • ESSURE TUBAL LIGATION       &     • GASTRIC SLEEVE LAPAROSCOPIC N/A 2018    Procedure: GASTRIC SLEEVE LAPAROSCOPIC;  Surgeon: Julio C Rodriguez MD;  Location: [facility] OR;  Service: Bariatric   • HERNIA REPAIR Left     inguinal; without mesh    • TEETH EXTRACTION     • TONSILLECTOMY                         PT Assessment/Plan     Row Name " "02/13/19 1000          PT Assessment    Assessment Comments  Good mary anne and effort with ther ex. Continued need for improved knee ROM and strength. Pt making good progress towards goals.   -JW        PT Plan    PT Frequency  2x/week;3x/week  -JW     Predicted Duration of Therapy Intervention (Therapy Eval)  4-6 weeks  -JW     PT Plan Comments  Cont PT per POC  -JW       User Key  (r) = Recorded By, (t) = Taken By, (c) = Cosigned By    Initials Name Provider Type    Giovanni Teran PTA Physical Therapy Assistant          Modalities     Row Name 02/13/19 0900             Ice    Patient reports will apply ice at home to involved area  Yes  -JW        User Key  (r) = Recorded By, (t) = Taken By, (c) = Cosigned By    Initials Name Provider Type    Giovanni Teran PTA Physical Therapy Assistant          Exercises     Row Name 02/13/19 0900             Precautions    Existing Precautions/Restrictions  no known precautions/restrictions  -JW         Subjective Comments    Subjective Comments  Pt c/o mod knee pain.   -JW         Subjective Pain    Able to rate subjective pain?  yes  -JW      Pre-Treatment Pain Level  6  -JW      Post-Treatment Pain Level  4  -JW         Exercise 1    Exercise Name 1  Pro II  -JW      Time 1  10'  -JW      Additional Comments  L3  -JW         Exercise 2    Exercise Name 2  Incline bd S  -JW      Time 2  1'  -JW         Exercise 3    Exercise Name 3  HS S  -JW      Sets 3  2  -JW      Time 3  30\"  -JW         Exercise 4    Exercise Name 4  Prone knee flex S  -JW      Sets 4  2  -JW      Time 4  30\"  -JW         Exercise 5    Exercise Name 5  Prone HS curls  -JW      Sets 5  2  -JW      Reps 5  10  -JW         Exercise 6    Exercise Name 6  SLR  -JW      Sets 6  2  -JW      Reps 6  20  -JW         Exercise 7    Exercise Name 7  Seated HS curls  -JW      Sets 7  2  -JW      Reps 7  10  -JW      Additional Comments  red tb  -JW         Exercise 8    Exercise Name 8  ST hip abd  -JW      " "Sets 8  2  -JW      Reps 8  20, 15  -JW         Exercise 9    Exercise Name 9  CR  -JW      Reps 9  30  -JW         Exercise 10    Exercise Name 10  MS  -JW      Sets 10  2  -JW      Reps 10  10  -JW        User Key  (r) = Recorded By, (t) = Taken By, (c) = Cosigned By    Initials Name Provider Type    Giovanni Teran PTA Physical Therapy Assistant                         PT OP Goals     Row Name 02/13/19 1000          PT Short Term Goals    STG Date to Achieve  02/26/19  -JW     STG 1  Demonstrate independence/compliance with HEP  -JW     STG 1 Progress  Ongoing  -JW     STG 2  Tolerate 45 minute treatment session without increased pain  -JW     STG 2 Progress  Ongoing  -JW     STG 3  Demonstrate L knee flex AROM to 120 deg or greater  -JW     STG 3 Progress  Ongoing  -JW     STG 4  Ambulate independently with no AD, non-antalgic gait present  -JW     STG 4 Progress  Ongoing  -JW        Long Term Goals    LTG Date to Achieve  03/19/19  -JW     LTG 1  Subjectively report 60% improvement or greater  -JW     LTG 1 Progress  Ongoing  -JW     LTG 2  Improve LEFS score to 45/80 or greater  -JW     LTG 2 Progress  Ongoing  -JW     LTG 3  Demonstrate L knee flex/ext MMT to 4+/5  -JW     LTG 3 Progress  Ongoing  -JW     LTG 4  Demonstrate L hip flex/abd MMT to 4+/5  -JW     LTG 4 Progress  Ongoing  -JW     LTG 5  Perform R SLS for 20\" without LOB, no increased pain  -JW     LTG 5 Progress  Ongoing  -JW     LTG 6  Ascend/descend 5 steps x 3, reciprocal pattern, no compensation noted  -JW     LTG 6 Progress  Ongoing  -JW     LTG 7  Return to walking for 15 min daily/3x week without increased pain  -JW     LTG 7 Progress  Ongoing  -JW       User Key  (r) = Recorded By, (t) = Taken By, (c) = Cosigned By    Initials Name Provider Type    Giovanni Teran PTA Physical Therapy Assistant                         Time Calculation:   Start Time: 0930  Stop Time: 1015  Time Calculation (min): 45 min  Total Timed Code Minutes- " PT: 45 minute(s)  Therapy Suggested Charges     Code   Minutes Charges    None           Therapy Charges for Today     Code Description Service Date Service Provider Modifiers Qty    12879136005 HC PT THER PROC EA 15 MIN 2/13/2019 Giovanni Santos, PTA GP 3                    Giovanni Santos, RANDY  2/13/2019

## 2019-02-15 ENCOUNTER — APPOINTMENT (OUTPATIENT)
Dept: PHYSICAL THERAPY | Facility: HOSPITAL | Age: 39
End: 2019-02-15

## 2019-02-20 ENCOUNTER — APPOINTMENT (OUTPATIENT)
Dept: PHYSICAL THERAPY | Facility: HOSPITAL | Age: 39
End: 2019-02-20

## 2019-02-21 ENCOUNTER — APPOINTMENT (OUTPATIENT)
Dept: PHYSICAL THERAPY | Facility: HOSPITAL | Age: 39
End: 2019-02-21

## 2019-02-22 ENCOUNTER — APPOINTMENT (OUTPATIENT)
Dept: PHYSICAL THERAPY | Facility: HOSPITAL | Age: 39
End: 2019-02-22

## 2019-02-26 ENCOUNTER — TELEPHONE (OUTPATIENT)
Dept: BARIATRICS/WEIGHT MGMT | Facility: CLINIC | Age: 39
End: 2019-02-26

## 2019-02-26 NOTE — TELEPHONE ENCOUNTER
S/P BA Surg and NSAIDS       Patient called stated that she has had a torn meniscus so her Ortho is wanting to put her on Mobic or Duexis.      S/P Gastric Sleeve on 11-28-19     She also is needing a refill on her Protonix 40mg Qd [facility].       Called spoke with Dr Rodriguez  Short term only and try to discontinue as quickly as possible as that it can cause Ulcers.  Recommend to not take medication on a empty stomach but with her 1/2 cup of Protein or Vegetables.  Do not add in anything like crackers to take with her medication.   She will need to take her Protonix with there NSAID.  Ok to call in Protonix 40mg qd with one refill to [facility].        Patient notified of his above instructions and prescription call in to [facility].  Spoke with Mello/Pharmacist.

## 2019-03-04 ENCOUNTER — OFFICE VISIT (OUTPATIENT)
Dept: BARIATRICS/WEIGHT MGMT | Facility: CLINIC | Age: 39
End: 2019-03-04

## 2019-03-04 ENCOUNTER — LAB (OUTPATIENT)
Dept: LAB | Facility: HOSPITAL | Age: 39
End: 2019-03-04

## 2019-03-04 VITALS
HEIGHT: 62 IN | HEART RATE: 100 BPM | SYSTOLIC BLOOD PRESSURE: 132 MMHG | BODY MASS INDEX: 40.3 KG/M2 | OXYGEN SATURATION: 100 % | DIASTOLIC BLOOD PRESSURE: 71 MMHG | TEMPERATURE: 97.6 F | WEIGHT: 219 LBS

## 2019-03-04 DIAGNOSIS — E66.01 OBESITY, CLASS III, BMI 40-49.9 (MORBID OBESITY) (HCC): ICD-10-CM

## 2019-03-04 DIAGNOSIS — Z98.84 STATUS POST LAPAROSCOPIC SLEEVE GASTRECTOMY: Primary | ICD-10-CM

## 2019-03-04 DIAGNOSIS — Z98.84 STATUS POST LAPAROSCOPIC SLEEVE GASTRECTOMY: ICD-10-CM

## 2019-03-04 LAB
25(OH)D3 SERPL-MCNC: 59 NG/ML (ref 30–100)
CALCIUM SPEC-SCNC: 9.4 MG/DL (ref 8.4–10.4)
FOLATE SERPL-MCNC: >20 NG/ML (ref 4.78–24.2)
IRON 24H UR-MRATE: 56 MCG/DL (ref 42–180)
VIT B12 BLD-MCNC: 740 PG/ML (ref 239–931)

## 2019-03-04 PROCEDURE — 99213 OFFICE O/P EST LOW 20 MIN: CPT | Performed by: NURSE PRACTITIONER

## 2019-03-04 PROCEDURE — 82607 VITAMIN B-12: CPT | Performed by: NURSE PRACTITIONER

## 2019-03-04 PROCEDURE — 82306 VITAMIN D 25 HYDROXY: CPT | Performed by: NURSE PRACTITIONER

## 2019-03-04 PROCEDURE — 82310 ASSAY OF CALCIUM: CPT | Performed by: NURSE PRACTITIONER

## 2019-03-04 PROCEDURE — 84590 ASSAY OF VITAMIN A: CPT | Performed by: NURSE PRACTITIONER

## 2019-03-04 PROCEDURE — 84425 ASSAY OF VITAMIN B-1: CPT | Performed by: NURSE PRACTITIONER

## 2019-03-04 PROCEDURE — 82746 ASSAY OF FOLIC ACID SERUM: CPT | Performed by: NURSE PRACTITIONER

## 2019-03-04 PROCEDURE — 83540 ASSAY OF IRON: CPT | Performed by: NURSE PRACTITIONER

## 2019-03-04 PROCEDURE — 36415 COLL VENOUS BLD VENIPUNCTURE: CPT

## 2019-03-04 PROCEDURE — 84446 ASSAY OF VITAMIN E: CPT | Performed by: NURSE PRACTITIONER

## 2019-03-04 NOTE — PROGRESS NOTES
"CC: 3 month follow up gastric sleeve    History:  Cathi Krishnan is a 38 y.o. female who presents today for evaluation of the above problems.      Patient is post sleeve gastrectomy bariatric surgery. This is the 3 month follow-up.   She is able to get 64 ounces of water in per day and is eating 3-5 meals per day. Each meal is appoximately 1/2 cup in volume.   She is using measuring cups to measure volume of food.  Patient is eating breakfast.  She is unsure how many grams of protein per day, but reports that 1/4 cup of each meal is protein.   Patient is taking dietary supplements. Supplements taken include: calcium, vitamin D, iron, multivitamin  She is exercising by walking 40 minutes every day.   She is not  drinking any soda.    Patient does  have complaints of heartburn.  This is not everyday and she does take Protonix for this.  If she has breakthrough symptoms she will occasionally take a Tums as well.  She also reports that she is occasionally taking Mobic for her arthralgias.  She does note that she tries not to take this if she can help it.      ROS:  Review of Systems   Gastrointestinal:        Heartburn   Musculoskeletal: Positive for arthralgias and back pain.   Psychiatric/Behavioral: Positive for dysphoric mood. The patient is nervous/anxious.        Allergies   Allergen Reactions   • Asa [Aspirin] Shortness Of Breath and Swelling   • Cortisone Shortness Of Breath and Swelling   • Penicillins Hives and Shortness Of Breath     \"All Cillins\"   • Prednisone Shortness Of Breath and Swelling   • Zantac [Ranitidine Hcl] Shortness Of Breath and Swelling   • Ibuprofen Hives   • Nsaids Unknown (See Comments)     Bariatric Surgery     Past Medical History:   Diagnosis Date   • Ankle swelling    • Anxiety    • Arthritis    • Back pain    • Constipation    • Depression    • Excessive thirst    • GERD (gastroesophageal reflux disease)    • Heartburn    • History of attempted suicide     age 13 years old    • " "Leg cramps     with walking   • Loss of bladder control leaking urine   • Pain     Shoulder, neck, knee,back     • Rash     in skin folds    • Rheumatoid arthritis (CMS/HCC)     \"starting\" was mild case per Dr. Pelaez   • Varicose veins of both lower extremities    • Wears glasses      Past Surgical History:   Procedure Laterality Date   • CHOLECYSTECTOMY  1999    lap   • ENDOSCOPY     • ENDOSCOPY N/A 7/31/2018    Procedure: ESOPHAGOGASTRODUODENOSCOPY WITH ANESTHESIA;  Surgeon: Julio C Rodriguez MD;  Location:  [facility];  Service: General   • ESSURE TUBAL LIGATION      2016 & 2017    • GASTRIC SLEEVE LAPAROSCOPIC N/A 11/28/2018    Procedure: GASTRIC SLEEVE LAPAROSCOPIC;  Surgeon: Julio C Rodriguez MD;  Location:  [facility] OR;  Service: Bariatric   • HERNIA REPAIR Left     inguinal; without mesh    • TEETH EXTRACTION     • TONSILLECTOMY       Family History   Problem Relation Age of Onset   • Hypertension Father    • Breast cancer Mother    • Ovarian cancer Mother    • Hypertension Mother    • Diabetes Mother    • Stroke Mother    • Coronary artery disease Mother    • Deep vein thrombosis Mother    • Obesity Mother    • Heart disease Mother    • Sleep apnea Mother    • Lung cancer Paternal Grandfather    • Hypertension Brother    • Obesity Brother    • Diabetes Brother    • Heart attack Brother    • Heart disease Brother    • Heart attack Maternal Grandmother    • Obesity Maternal Grandmother    • Obesity Brother       reports that she quit smoking about 9 years ago. Her smoking use included cigarettes. She has a 10.00 pack-year smoking history. she has never used smokeless tobacco. She reports that she does not drink alcohol or use drugs.      Current Outpatient Medications:   •  calcium citrate-vitamin d (CALCITRATE) 315-250 MG-UNIT tablet tablet, Take  by mouth Daily., Disp: , Rfl:   •  docusate sodium (COLACE) 100 MG capsule, Take 100 mg by mouth Daily., Disp: , Rfl:   •  DULoxetine (CYMBALTA) 60 MG capsule, " "Take 60 mg by mouth 2 (Two) Times a Day., Disp: , Rfl:   •  ferrous sulfate 325 (65 FE) MG tablet, Take 325 mg by mouth Daily With Breakfast., Disp: , Rfl:   •  Multiple Vitamins-Minerals (MULTIVITAMIN ADULT PO), Take  by mouth., Disp: , Rfl:   •  pantoprazole (PROTONIX) 40 MG EC tablet, Take 1 tablet by mouth Daily., Disp: 30 tablet, Rfl: 1  •  propranolol (INDERAL) 20 MG tablet, Take 20 mg by mouth 3 (Three) Times a Day As Needed (panic and anxiety)., Disp: , Rfl:   •  pseudoephedrine (SUDAFED) 30 MG tablet, Take 30 mg by mouth Daily As Needed for Congestion., Disp: , Rfl:   •  vitamin D (ERGOCALCIFEROL) 09458 UNITS capsule capsule, Take 50,000 Units by mouth Every 7 (Seven) Days. Tuesday, Disp: , Rfl:   •  norethindrone-ethinyl estradiol FE (MICROGESTIN FE 1/20) 1-20 MG-MCG per tablet, Take 1 tablet by mouth Daily., Disp: 28 tablet, Rfl: 3  •  ondansetron ODT (ZOFRAN ODT) 4 MG disintegrating tablet, Take 1 tablet by mouth Every 8 (Eight) Hours As Needed for Nausea or Vomiting., Disp: 30 tablet, Rfl: 1  •  simethicone (GAS-X) 80 MG chewable tablet, Chew 0.5 tablets Every 6 (Six) Hours As Needed for Flatulence (belching)., Disp: 30 tablet, Rfl: 1    OBJECTIVE:  /71 (BP Location: Right arm, Patient Position: Sitting, Cuff Size: Adult)   Pulse 100   Temp 97.6 °F (36.4 °C)   Ht 157.5 cm (62\")   Wt 99.3 kg (219 lb)   SpO2 100%   BMI 40.06 kg/m²    Physical Exam   Constitutional: She is oriented to person, place, and time. Vital signs are normal. She appears well-developed and well-nourished.   Cardiovascular: Normal rate.   Pulmonary/Chest: Effort normal.   Neurological: She is alert and oriented to person, place, and time.   Psychiatric: She has a normal mood and affect. Her behavior is normal.   Vitals reviewed.      Assessment/Plan    Cathi was seen today for post-op.    Diagnoses and all orders for this visit:    Status post laparoscopic sleeve gastrectomy  -     Vitamin A & E; Future  -     Vitamin " B12; Future  -     Iron; Future  -     Calcium; Future  -     Vitamin D 25 Hydroxy; Future  -     Folate; Future  -     Vitamin B1, Whole Blood; Future    Obesity, Class III, BMI 40-49.9 (morbid obesity) (CMS/[facility])  -     Vitamin A & E; Future  -     Vitamin B12; Future  -     Iron; Future  -     Calcium; Future  -     Vitamin D 25 Hydroxy; Future  -     Folate; Future  -     Vitamin B1, Whole Blood; Future    Patient is doing well overall. BMI is currently 40.1 with goal of getting BMI to 29 or less.  Lab work will be drawn today. Results will be called once everything is completed.    Goals: continue positive lifestyle changes with diet and exercise.   Continue supplements as advised.   Patient will follow up in 3 months and will call the office if needs anything prior to that appointment.   She is advised that she should involve avoid taking any NSAID medications due to increased risk of ulcers.    An After Visit Summary was printed and given to the patient at discharge.  Return in about 3 months (around 6/4/2019).         Tootie Canchola, ARGENIS  3/4/2019

## 2019-03-06 LAB
A-TOCOPHEROL VIT E SERPL-MCNC: 12.8 MG/L (ref 5.9–19.4)
GAMMA TOCOPHEROL SERPL-MCNC: 1 MG/L (ref 0.7–4.9)
VIT A SERPL-MCNC: 33.5 UG/DL (ref 18.9–57.3)
VIT B1 BLD-SCNC: 198.2 NMOL/L (ref 66.5–200)

## 2019-03-11 ENCOUNTER — DOCUMENTATION (OUTPATIENT)
Dept: PHYSICAL THERAPY | Facility: HOSPITAL | Age: 39
End: 2019-03-11

## 2019-03-11 DIAGNOSIS — Z98.890 S/P LEFT KNEE ARTHROSCOPY: ICD-10-CM

## 2019-03-11 DIAGNOSIS — S83.242A ACUTE MEDIAL MENISCUS TEAR OF LEFT KNEE, INITIAL ENCOUNTER: Primary | ICD-10-CM

## 2019-03-11 NOTE — THERAPY DISCHARGE NOTE
"     Outpatient Physical Therapy Discharge Summary         Patient Name: Cathi Krishnan  : 1980  MRN: [id number]    Today's Date: 3/11/2019    Visit Dx:    ICD-10-CM ICD-9-CM   1. Acute medial meniscus tear of left knee, initial encounter S83.242A 836.0   2. S/P left knee arthroscopy Z98.890 V45.89       PT OP Goals     Row Name 19 1300          PT Short Term Goals    STG Date to Achieve  19  -KG     STG 1  Demonstrate independence/compliance with HEP  -KG     STG 1 Progress  Met  -KG     STG 2  Tolerate 45 minute treatment session without increased pain  -KG     STG 2 Progress  Met  -KG     STG 3  Demonstrate L knee flex AROM to 120 deg or greater  -KG     STG 3 Progress  Not Met  -KG     STG 4  Ambulate independently with no AD, non-antalgic gait present  -KG     STG 4 Progress  Not Met  -KG        Long Term Goals    LTG Date to Achieve  19  -KG     LTG 1  Subjectively report 60% improvement or greater  -KG     LTG 1 Progress  Not Met  -KG     LTG 2  Improve LEFS score to 45/80 or greater  -KG     LTG 2 Progress  Not Met  -KG     LTG 3  Demonstrate L knee flex/ext MMT to 4+/5  -KG     LTG 3 Progress  Not Met  -KG     LTG 4  Demonstrate L hip flex/abd MMT to 4+/5  -KG     LTG 4 Progress  Not Met  -KG     LTG 5  Perform R SLS for 20\" without LOB, no increased pain  -KG     LTG 5 Progress  Not Met  -KG     LTG 6  Ascend/descend 5 steps x 3, reciprocal pattern, no compensation noted  -KG     LTG 6 Progress  Not Met  -KG     LTG 7  Return to walking for 15 min daily/3x week without increased pain  -KG     LTG 7 Progress  Not Met  -KG       User Key  (r) = Recorded By, (t) = Taken By, (c) = Cosigned By    Initials Name Provider Type    Marjorie Barnes, PT Physical Therapist          OP PT Discharge Summary  Date of Discharge: 02/15/18  Reason for Discharge: Patient/Caregiver request, other (comment)(Pt called and reported that she would like to stop PT at this time, did not give the "  a reason for wanting to be discharged.)  Outcomes Achieved: Unable to make functional progress toward goals at this time  Discharge Destination: Home with home program      Time Calculation:        Therapy Suggested Charges     Code   Minutes Charges    None                       Marjorie Toscano, PT  3/11/2019

## 2019-03-22 ENCOUNTER — RESULTS ENCOUNTER (OUTPATIENT)
Dept: BARIATRICS/WEIGHT MGMT | Facility: CLINIC | Age: 39
End: 2019-03-22

## 2019-03-22 DIAGNOSIS — E66.01 OBESITY, CLASS III, BMI 40-49.9 (MORBID OBESITY) (HCC): ICD-10-CM

## 2019-03-28 ENCOUNTER — TELEPHONE (OUTPATIENT)
Dept: BARIATRICS/WEIGHT MGMT | Facility: CLINIC | Age: 39
End: 2019-03-28

## 2019-03-28 NOTE — TELEPHONE ENCOUNTER
Left message that appointment on 05/30/2019 was moved to 06/03/2019 because Dr. Rodriguez is in surgery that morning. Reminder sent as well. Asked that patient call if she needs to reschedule.

## 2019-04-25 DIAGNOSIS — F41.9 ANXIETY: ICD-10-CM

## 2019-04-25 DIAGNOSIS — F33.1 MODERATE RECURRENT MAJOR DEPRESSION (HCC): ICD-10-CM

## 2019-04-25 RX ORDER — DULOXETIN HYDROCHLORIDE 60 MG/1
60 CAPSULE, DELAYED RELEASE ORAL 2 TIMES DAILY
Qty: 60 CAPSULE | Refills: 2 | Status: SHIPPED | OUTPATIENT
Start: 2019-04-25 | End: 2019-06-07

## 2019-04-25 RX ORDER — PROPRANOLOL HYDROCHLORIDE 20 MG/1
20 TABLET ORAL 3 TIMES DAILY PRN
Qty: 90 TABLET | Refills: 2 | Status: SHIPPED | OUTPATIENT
Start: 2019-04-25 | End: 2019-06-07 | Stop reason: ALTCHOICE

## 2019-04-25 NOTE — TELEPHONE ENCOUNTER
Called to confirm appointment with patient for 4-26-19. Patient stated that she really did not have the gas money and she was doing great on the medications and asked if she could just get refills and  needed to R/S. Last OV:     01.25.19   MONICA Abarca  Next OV:     06.14.19   BERNY Peters      Requested Prescriptions     Pending Prescriptions Disp Refills    DULoxetine (CYMBALTA) 60 MG extended release capsule 60 capsule 2     Sig: Take 1 capsule by mouth 2 times daily For mood and anxiety    propranolol (INDERAL) 20 MG tablet 90 tablet 2     Sig: Take 1 tablet by mouth 3 times daily as needed (for anxiety, for panic)       4/25/2019 10:04 AM   Progress Note        Carl Guaman 1980  Psychotherapy Time Spent: 15 min      Psychotherapy Topics: health    PCP IS: Blaire Bee        Subjective:  Patient is a 45 diagnosed with depression and presents today for follow-up. Last seen in clinic on *10/22 and prior records were reviewed. History obtained via chart review and patient    CHIEF COMPLAINT:    Today patient is limping, had left knee surgery Jan 17. Recovering. Noted she gets tired, but is ready to get back to her routine. Has post-op visit next Friday. Been doing OK. \"I've been denied disability, they said there's nothing wrong with me. \" Reports MRI did not show the back problems, so it was not considered. Also that depression was not a problem for her because she can be around people and there are jobs that would not require she be around people. This is her second denial.  Not sure if she will appeal.     Had bariatric surgery 2 months ago. Is doing well post-op. Has gone to first post-op visit. Is portioning meals. \"Feeling good because I am losing weight. \" Hopes to walk more as knee heals. Looking forward to spring so she can be more active.  At present enjoys reading, working on craft projects              SUBSTANCE USE/ABUSE:   Tobacco: denied   Alcohol: denied   Marijuana: intact , remote memory intact ,   good fund of knowledge, average  intelligence level. Speech:  normal  Language: intact  Conversation no evidence of delusions  Behavior:  Cooperative and Good eye contact  Mood: \"it comes and goes. I guess it's normal.\" gets down when she can't do stuff, due to knee  Affect: congruent with mood  Thought Content: no evidence of overt psychosis or delusional thought  Thought Process: linear, goal directed and coherent  Judgement Insight regarding illness[de-identified]  normal and appropriate        Assesment: Major depression, recurrent    Anxiety unspecified        Plan: Continue duloxetine 60 mg po bid for mood and anxiety   Continue propranolol 20 mg po tid prn for panic/anxiety        The risks, benefits, side effects, indications, contraindications, and adverse effects of the medications have been discussed. Yes. The pt has verbalized understanding and has capacity to give informed consent. The Tyrel Isaura report has been reviewed according to [facility] regulations. Controlled substance Treatment Plan:  Not applicable  Supportive therapy offered. The patient has been advised to call with any problems.       Follow up: 3 months/prn        Louis Alvares MA

## 2019-05-03 RX ORDER — PANTOPRAZOLE SODIUM 40 MG/1
40 TABLET, DELAYED RELEASE ORAL DAILY
Qty: 30 TABLET | Refills: 1 | Status: SHIPPED | OUTPATIENT
Start: 2019-05-03

## 2019-05-03 NOTE — TELEPHONE ENCOUNTER
Called and spoke with the patient     Original prescription was given by her PCP.  Dr Rodriguez just refilled after her EGD.  She is going to contact her PCP and follow up with them.       Dr Rodriguez notified

## 2019-05-06 ENCOUNTER — TELEPHONE (OUTPATIENT)
Dept: BARIATRICS/WEIGHT MGMT | Facility: CLINIC | Age: 39
End: 2019-05-06

## 2019-05-06 NOTE — TELEPHONE ENCOUNTER
Regarding: RE: Non-Urgent Medical Question  Contact: [phone]  ----- Message from Echograph, Generic sent at 5/4/2019 11:49 PM EDT -----      I am taking all my vitamins and I even up my protein but nothing is helping my hair is still falling out. Should I go to my rather doctor to see why?   ----- Message -----  From: YUE MCDOWELL  Sent: 4/18/19 9:09 AM  To: Cathi Krishnan  Subject: RE: Non-Urgent Medical Question    Make sure you're taking your vitamins and that your protein intake is up to the level recommended. Hair growth goes in cycles.     ----- Message -----  From: Cathi Krishnan  Sent: 4/17/2019   2:12 PM  To: [facility] Clinical Pool  Subject: Non-Urgent Medical Question                      My hair is falling out more then usual is this normal.          Called left a message for the patient to return a call to the office     Patient called back-addressed her concern  Patient questioned how many grams of protein she is getting in a days time.     Patient stated that the is eating 5 chicken fingers per day along with a Protein shake that has 30 grams.      Recommend that the patient check the label to see exactly how much protein is in her chicken fingers.  Patient should make sure during this process that she is getting at least 65 grams of protein, taking her multivitamin.  Hair grows in cycles and it is normal for S/P BA Surgery patients hair to thin.     Oked per patient

## 2019-06-03 ENCOUNTER — OFFICE VISIT (OUTPATIENT)
Dept: BARIATRICS/WEIGHT MGMT | Facility: CLINIC | Age: 39
End: 2019-06-03

## 2019-06-03 VITALS
BODY MASS INDEX: 40.7 KG/M2 | HEIGHT: 62 IN | OXYGEN SATURATION: 97 % | DIASTOLIC BLOOD PRESSURE: 66 MMHG | SYSTOLIC BLOOD PRESSURE: 105 MMHG | HEART RATE: 71 BPM | WEIGHT: 221.2 LBS | TEMPERATURE: 97.8 F

## 2019-06-03 DIAGNOSIS — Z98.84 STATUS POST LAPAROSCOPIC SLEEVE GASTRECTOMY: Primary | ICD-10-CM

## 2019-06-03 DIAGNOSIS — E66.01 OBESITY, CLASS III, BMI 40-49.9 (MORBID OBESITY) (HCC): ICD-10-CM

## 2019-06-03 PROCEDURE — 99213 OFFICE O/P EST LOW 20 MIN: CPT | Performed by: SURGERY

## 2019-06-03 NOTE — PROGRESS NOTES
"Subjective   Cathi Krishnan is a 38 y.o. female.     Cathi is post op 6 months from her sleeve gastrectomy procedure.  She is currently on her regular diet.  She states she is consuming 5 meals a day with portion sizes of no more than a half a cup.  She consumes at least 64 ounces of water and 60 g of protein daily.  She also exercises every day for 30 minutes in the form of walking.  She states that she is also utilizing beings as a form of protein.    Review Of Systems:  General ROS: positive for  -hair loss  Respiratory ROS: no cough, shortness of breath, or wheezing  Cardiovascular ROS: no chest pain or dyspnea on exertion  Gastrointestinal ROS: positive for - heartburn    The following portions of the patient's history were reviewed and updated as appropriate: allergies, current medications, past medical history, past surgical history and problem list.    Objective   /66 (BP Location: Right arm, Patient Position: Sitting, Cuff Size: Adult)   Pulse 71   Temp 97.8 °F (36.6 °C)   Ht 157.5 cm (62\")   Wt 100 kg (221 lb 3.2 oz)   SpO2 97%   BMI 40.46 kg/m²     General Appearance:  awake, alert, oriented, in no acute distress  Abdomen:  Soft, non-tender, normal bowel sounds; no bruits, organomegaly or masses.  Abnormal shape: obese  Wounds: clean, dry, intact    Assessment/Plan     Encounter Diagnoses   Name Primary?   • Status post laparoscopic sleeve gastrectomy Yes   • Obesity, Class III, BMI 40-49.9 (morbid obesity) (CMS/[facility])        Cathi Krishnan and I discussed the importance of changing behavior for consistent and successful weight loss.  We first reviewed again the definition of a meal which is defined as portion sizes less than a half a cup and those portions incorporating a protein.  Specifically the protein should fill half of that volume.  I also explained that she should attempt to consume 4 meals as defined above daily and to avoid snacking or grazing.  She should also be mindful of " adequate hydration by consuming at least 64 oz of water daily and incorporation of a regular exercise regimen.  With regards to her hair loss I recommended continue with her multivitamins, be consistent with her eating regimen as well as protein intake.  And I have explained that weight loss surgery as the loss as a side effect  I have cautioned the patient on the increased amount of being in her diet.  I explained that beings can provide a higher source of carbohydrates if she is not aware of the total amounts and the types of beans.  I discussed the importance of taking her multivitamins as directed.  She is to return to our office 6 months or as needed.    06/03/19  10:35 AM  Patient Care Team:  Shlomo Lira MD as PCP - General (Family Medicine)  Julio C Rodriguez MD FACS

## 2019-06-07 ENCOUNTER — OFFICE VISIT (OUTPATIENT)
Dept: PSYCHIATRY | Age: 39
End: 2019-06-07
Payer: COMMERCIAL

## 2019-06-07 VITALS
DIASTOLIC BLOOD PRESSURE: 76 MMHG | SYSTOLIC BLOOD PRESSURE: 116 MMHG | BODY MASS INDEX: 40.3 KG/M2 | HEART RATE: 77 BPM | HEIGHT: 62 IN | OXYGEN SATURATION: 99 % | WEIGHT: 219 LBS

## 2019-06-07 DIAGNOSIS — F33.1 MODERATE RECURRENT MAJOR DEPRESSION (HCC): Primary | ICD-10-CM

## 2019-06-07 DIAGNOSIS — F99 INSOMNIA DUE TO OTHER MENTAL DISORDER: ICD-10-CM

## 2019-06-07 DIAGNOSIS — F51.05 INSOMNIA DUE TO OTHER MENTAL DISORDER: ICD-10-CM

## 2019-06-07 DIAGNOSIS — F41.1 GENERALIZED ANXIETY DISORDER: ICD-10-CM

## 2019-06-07 PROCEDURE — 99214 OFFICE O/P EST MOD 30 MIN: CPT | Performed by: NURSE PRACTITIONER

## 2019-06-07 RX ORDER — BUSPIRONE HYDROCHLORIDE 10 MG/1
10 TABLET ORAL 3 TIMES DAILY
Qty: 90 TABLET | Refills: 3 | Status: SHIPPED | OUTPATIENT
Start: 2019-06-07 | End: 2019-07-07

## 2019-06-07 RX ORDER — DOXEPIN HYDROCHLORIDE 10 MG/1
10 CAPSULE ORAL NIGHTLY
Qty: 30 CAPSULE | Refills: 3 | Status: SHIPPED | OUTPATIENT
Start: 2019-06-07 | End: 2020-06-01 | Stop reason: SDUPTHER

## 2019-06-07 RX ORDER — DULOXETIN HYDROCHLORIDE 60 MG/1
60 CAPSULE, DELAYED RELEASE ORAL DAILY
Qty: 30 CAPSULE | Refills: 2
Start: 2019-06-07 | End: 2019-09-06 | Stop reason: SDUPTHER

## 2019-06-07 RX ORDER — DULOXETIN HYDROCHLORIDE 30 MG/1
30 CAPSULE, DELAYED RELEASE ORAL DAILY
Qty: 90 CAPSULE | Refills: 1 | Status: SHIPPED | OUTPATIENT
Start: 2019-06-07 | End: 2020-02-04 | Stop reason: SDUPTHER

## 2019-06-07 SDOH — HEALTH STABILITY: MENTAL HEALTH: HOW OFTEN DO YOU HAVE A DRINK CONTAINING ALCOHOL?: NEVER

## 2019-06-07 NOTE — PROGRESS NOTES
6/7/2019 11:35 AM   Progress Note    IN:  1040  OUT: [facility] 1980      Chief Complaint   Patient presents with    Insomnia         Subjective:  Patient is a 45 y.o. female diagnosed with MDD and presents today for follow-up. Last seen in clinic on 1/25/19 with Jaelyn Lizarraga and prior records were reviewed. Today patient states, \"I'm doing good. \" She thinks that her medications are doing well but she would like for sleep. She says she is restless at night. Patient reports side effects as follows: none. No evidence of EPS, no cogwheeling or abnormal motor movements. Absent  suicidal ideation. Reports compliance with medications as good .      PRIOR MED TRIALS  Propranolol (current)  Cymbalta (current)      Current Substance Use:  See history    BP: /76 (Site: Right Upper Arm, Position: Sitting, Cuff Size: Medium Adult)   Pulse 77   Ht 5' 2\" (1.575 m)   Wt 219 lb (99.3 kg)   SpO2 99%   BMI 40.06 kg/m²       Review of Systems - 14 point review:  Negative except for: anxiety, depression, insomnia, chronic back and knee pain, acid reflux    Constitutional: (fevers, chills, night sweats, wt loss/gain, change in appetite, fatigue, somnolence)    HEENT: (ear pain or discharge, hearing loss, ear ringing, sinus pressure, nosebleed, nasal discharge, sore throat, oral sores, tooth pain, bleeding gums, hoarse voice, neck pain)      Cardiovascular: (HTN, chest pain, elevated cholesterol/lipids, palpitations, leg swelling, leg pain with walking)    Respiratory: (cough, wheezing, snoring, SOB with activity (dyspnea), SOB while lying flat (orthopnea), awakening with severe SOB (paroxysmal nocturnal dyspnea))    Gastrointestinal: (NVD, constipation, abdominal pain, bright red stools, black tarry stools, stool incontinence)     Genitourinary:  (pelvic pain, burning or frequency of urination, urinary urgency, blood in urine incomplete bladder emptying, urinary incontinence, STD; MEN: testicular Provider, MD   vitamin D (ERGOCALCIFEROL) 77334 units CAPS capsule Take 5,000 Units by mouth once a week 6/19/18  Yes Historical Provider, MD   ferrous sulfate 325 (65 Fe) MG tablet Take 325 mg by mouth daily   Yes Historical Provider, MD   traMADol (ULTRAM) 50 MG tablet Take 50 mg by mouth 4 times daily. . 1/14/17  Yes Historical Provider, MD         MSE:  Patient is  A & O x 4. Appearance:  well-appearing appropriately dressed for season and age. Cognition:  Recent memory intact , remote memory intact , good fund of knowledge, average  intelligence level. Speech:  normal  Language: Naming: intact; Word Finding: intact  Conversation no evidence of delusions  Behavior:  Cooperative and Good eye contact  Mood: euthymic  Affect: congruent with mood  Thought Content: no evidence of overt psychosis, delusional thought or suicidal /homicidal ideation or plan  Thought Process: linear, goal directed and coherent  Associations: logical connections  Attention Span and concentration: Normal   Judgement Insight:  normal and appropriate  Gait and Station:normal gait and station   Sleep: avg 6 hrs   Appetite: ok      No results found for: NA, K, CL, CO2, BUN, CREATININE, GLUCOSE, CALCIUM, PROT, LABALBU, BILITOT, ALKPHOS, AST, ALT, LABGLOM, GFRAA, AGRATIO, GLOB  No results found for: NA, K, CL, CO2, BUN, CREATININE, GLUCOSE, CALCIUM   No results found for: CHOL  No results found for: TRIG  No results found for: HDL  No results found for: LDLCHOLESTEROL, LDLCALC  No results found for: LABVLDL, VLDL  No results found for: CHOLHDLRATIO  No results found for: LABA1C  No results found for: EAG  No results found for: TSHFT4, TSH  No results found for: VITD25    Assessments Administered:    PHQ9: 15, moderately severe  HAM-A: 27, severe    Assessment:   1. Moderate recurrent major depression ([facility] [address]    2. Generalized anxiety disorder    3.  Insomnia due to other mental disorder        Plan:  Continue:  Cymbalta, 60mg, daily in the anxiety. She wanted help with sleep. Will start Doxepin. Will follow up in 3 months. 10.Over 50% of the total visit time of   25  minutes was spent on counseling and/or coordination of care of  MDD/Insomnia.                                     Psychotherapy Topics: mood/medication effectiveness       Caleb Bolivar PMHNP-BC

## 2019-06-07 NOTE — PATIENT INSTRUCTIONS
Plan:  Continue:  Cymbalta, 60mg, daily in the morning    Add:  Cymbalta, 30mg, daily with the evening meal    Start:  Buspirone, 10mg, three times daily  Doxepin, 10mg, nightly for sleep    Stop: Propranolol    Follow up: Return in about 3 months (around 9/7/2019).

## 2019-09-05 ENCOUNTER — TELEPHONE (OUTPATIENT)
Dept: PSYCHIATRY | Age: 39
End: 2019-09-05

## 2019-09-05 DIAGNOSIS — F33.1 MODERATE RECURRENT MAJOR DEPRESSION (HCC): ICD-10-CM

## 2019-09-05 DIAGNOSIS — F41.1 GENERALIZED ANXIETY DISORDER: ICD-10-CM

## 2019-09-06 ENCOUNTER — TELEPHONE (OUTPATIENT)
Dept: PSYCHIATRY | Age: 39
End: 2019-09-06

## 2019-09-06 RX ORDER — DULOXETIN HYDROCHLORIDE 60 MG/1
60 CAPSULE, DELAYED RELEASE ORAL DAILY
Qty: 30 CAPSULE | Refills: 2 | Status: SHIPPED | OUTPATIENT
Start: 2019-09-06 | End: 2020-02-04 | Stop reason: SDUPTHER

## 2019-10-28 ENCOUNTER — OFFICE VISIT (OUTPATIENT)
Dept: PSYCHIATRY | Age: 39
End: 2019-10-28
Payer: COMMERCIAL

## 2019-10-28 VITALS
HEIGHT: 62 IN | DIASTOLIC BLOOD PRESSURE: 72 MMHG | HEART RATE: 78 BPM | BODY MASS INDEX: 41.96 KG/M2 | SYSTOLIC BLOOD PRESSURE: 116 MMHG | OXYGEN SATURATION: 98 % | TEMPERATURE: 98.3 F | WEIGHT: 228 LBS

## 2019-10-28 DIAGNOSIS — F41.1 GAD (GENERALIZED ANXIETY DISORDER): ICD-10-CM

## 2019-10-28 DIAGNOSIS — F99 INSOMNIA DUE TO OTHER MENTAL DISORDER: ICD-10-CM

## 2019-10-28 DIAGNOSIS — F51.05 INSOMNIA DUE TO OTHER MENTAL DISORDER: ICD-10-CM

## 2019-10-28 DIAGNOSIS — F33.0 MILD EPISODE OF RECURRENT MAJOR DEPRESSIVE DISORDER (HCC): Primary | ICD-10-CM

## 2019-10-28 PROCEDURE — 99214 OFFICE O/P EST MOD 30 MIN: CPT | Performed by: NURSE PRACTITIONER

## 2019-10-28 RX ORDER — BUSPIRONE HYDROCHLORIDE 15 MG/1
15 TABLET ORAL 3 TIMES DAILY
Qty: 90 TABLET | Refills: 3 | Status: SHIPPED | OUTPATIENT
Start: 2019-10-28 | End: 2020-09-28

## 2019-11-19 ENCOUNTER — TELEPHONE (OUTPATIENT)
Dept: BARIATRICS/WEIGHT MGMT | Facility: CLINIC | Age: 39
End: 2019-11-19

## 2019-12-16 ENCOUNTER — OFFICE VISIT (OUTPATIENT)
Dept: BARIATRICS/WEIGHT MGMT | Facility: CLINIC | Age: 39
End: 2019-12-16

## 2019-12-16 VITALS
OXYGEN SATURATION: 98 % | HEIGHT: 62 IN | SYSTOLIC BLOOD PRESSURE: 129 MMHG | TEMPERATURE: 97.5 F | HEART RATE: 74 BPM | BODY MASS INDEX: 42.58 KG/M2 | WEIGHT: 231.4 LBS | DIASTOLIC BLOOD PRESSURE: 83 MMHG

## 2019-12-16 DIAGNOSIS — Z98.84 STATUS POST LAPAROSCOPIC SLEEVE GASTRECTOMY: ICD-10-CM

## 2019-12-16 DIAGNOSIS — E66.01 CLASS 3 SEVERE OBESITY DUE TO EXCESS CALORIES WITH SERIOUS COMORBIDITY AND BODY MASS INDEX (BMI) OF 40.0 TO 44.9 IN ADULT (HCC): Primary | ICD-10-CM

## 2019-12-16 DIAGNOSIS — K21.9 GASTROESOPHAGEAL REFLUX DISEASE, ESOPHAGITIS PRESENCE NOT SPECIFIED: ICD-10-CM

## 2019-12-16 PROCEDURE — 99214 OFFICE O/P EST MOD 30 MIN: CPT | Performed by: NURSE PRACTITIONER

## 2019-12-16 RX ORDER — HYDROCODONE BITARTRATE AND ACETAMINOPHEN 5; 325 MG/1; MG/1
1 TABLET ORAL EVERY 6 HOURS PRN
COMMUNITY

## 2019-12-16 RX ORDER — BUSPIRONE HYDROCHLORIDE 15 MG/1
15 TABLET ORAL 3 TIMES DAILY
COMMUNITY

## 2019-12-16 NOTE — PROGRESS NOTES
"   Patient Care Team:  Shlomo Lira MD as PCP - General (Family Medicine)    Reason for Visit:  S/P Sleeve Gastrectomy    Subjective      Cathi Krishnan is post sleeve gastrectomy bariatric surgery. This is a 1 year follow-up. Patient is able to get 32 ounces of water in per day. Patient is eating 5 meals per day, approximately 1/2 cup in volume, which she states she does not measure. Patient is able to get 20 grams of protein in per day. Patient is taking a multiple vitamin, calcium with vitamin D supplement, and vitamin B12 supplement as advised. Patient is exercising by walking everyday for 20 minutes. Patient does not have any complaints of dysphagia, N/V, or abdominal pain. She does state heartburn, which is control with medication. Has gained 10 lbs since last visit.    Vitals:    12/16/19 0919   BP: 129/83   BP Location: Right arm   Patient Position: Sitting   Cuff Size: Adult   Pulse: 74   Temp: 97.5 °F (36.4 °C)   SpO2: 98%   Weight: 105 kg (231 lb 6.4 oz)   Height: 157.5 cm (62\")         Review of Systems  Negative except the below listed  Psychological ROS: positive for - anxiety and depression  Gastrointestinal ROS: positive for - heartburn  Genito-Urinary ROS: positive for - urinary frequency/urgency  Musculoskeletal ROS: positive for - pain in back, neck, and knee    History  Past Medical History:   Diagnosis Date   • Ankle swelling    • Anxiety    • Arthritis    • Back pain    • Constipation    • Depression    • Excessive thirst    • GERD (gastroesophageal reflux disease)    • Heartburn    • History of attempted suicide     age 13 years old    • Leg cramps     with walking   • Loss of bladder control leaking urine   • Pain     Shoulder, neck, knee,back     • Rash     in skin folds    • Rheumatoid arthritis (CMS/HCC)     \"starting\" was mild case per Dr. Pelaez   • Varicose veins of both lower extremities    • Wears glasses      Past Surgical History:   Procedure Laterality Date   • " CHOLECYSTECTOMY      lap   • ENDOSCOPY     • ENDOSCOPY N/A 2018    Procedure: ESOPHAGOGASTRODUODENOSCOPY WITH ANESTHESIA;  Surgeon: Julio C Rodriguez MD;  Location:  [facility];  Service: General   • ESSURE TUBAL LIGATION       &     • GASTRIC SLEEVE LAPAROSCOPIC N/A 2018    Procedure: GASTRIC SLEEVE LAPAROSCOPIC;  Surgeon: Julio C Rodriguez MD;  Location: [facility] OR;  Service: Bariatric   • HERNIA REPAIR Left     inguinal; without mesh    • TEETH EXTRACTION     • TONSILLECTOMY       Family History   Problem Relation Age of Onset   • Hypertension Father    • Breast cancer Mother    • Ovarian cancer Mother    • Hypertension Mother    • Diabetes Mother    • Stroke Mother    • Coronary artery disease Mother    • Deep vein thrombosis Mother    • Obesity Mother    • Heart disease Mother    • Sleep apnea Mother    • Lung cancer Paternal Grandfather    • Hypertension Brother    • Obesity Brother    • Diabetes Brother    • Heart attack Brother    • Heart disease Brother    • Heart attack Maternal Grandmother    • Obesity Maternal Grandmother    • Obesity Brother      Social History     Tobacco Use   • Smoking status: Former Smoker     Packs/day: 1.00     Years: 10.00     Pack years: 10.00     Types: Cigarettes     Last attempt to quit: 2010     Years since quittin.9   • Smokeless tobacco: Never Used   Substance Use Topics   • Alcohol use: No   • Drug use: No       (Not in a hospital admission)  Allergies:  Asa [aspirin]; Cortisone; Penicillins; Prednisone; Zantac [ranitidine hcl]; Ibuprofen; and Nsaids      Current Outpatient Medications:   •  BIOTIN PO, Take  by mouth., Disp: , Rfl:   •  busPIRone (BUSPAR) 15 MG tablet, Take 15 mg by mouth 3 (Three) Times a Day., Disp: , Rfl:   •  calcium citrate-vitamin d (CALCITRATE) 315-250 MG-UNIT tablet tablet, Take  by mouth Daily., Disp: , Rfl:   •  Cyanocobalamin (VITAMIN B 12 PO), Take  by mouth., Disp: , Rfl:   •  docusate sodium (COLACE) 100 MG  capsule, Take 90 mg by mouth Daily., Disp: , Rfl:   •  DULoxetine (CYMBALTA) 60 MG capsule, Take 60 mg by mouth 2 (Two) Times a Day., Disp: , Rfl:   •  ferrous sulfate 325 (65 FE) MG tablet, Take 325 mg by mouth Daily With Breakfast., Disp: , Rfl:   •  HYDROcodone-acetaminophen (NORCO) 5-325 MG per tablet, Take 1 tablet by mouth Every 6 (Six) Hours As Needed., Disp: , Rfl:   •  Multiple Vitamins-Minerals (MULTIVITAMIN ADULT PO), Take  by mouth., Disp: , Rfl:   •  pantoprazole (PROTONIX) 40 MG EC tablet, Take 1 tablet by mouth Daily., Disp: 30 tablet, Rfl: 1  •  pseudoephedrine (SUDAFED) 30 MG tablet, Take 30 mg by mouth Daily As Needed for Congestion., Disp: , Rfl:   •  vitamin D (ERGOCALCIFEROL) 82227 UNITS capsule capsule, Take 50,000 Units by mouth Every 7 (Seven) Days. Tuesday, Disp: , Rfl:   •  propranolol (INDERAL) 20 MG tablet, Take 20 mg by mouth 3 (Three) Times a Day As Needed (panic and anxiety)., Disp: , Rfl:   •  simethicone (GAS-X) 80 MG chewable tablet, Chew 0.5 tablets Every 6 (Six) Hours As Needed for Flatulence (belching)., Disp: 30 tablet, Rfl: 1    Objective     Vital Signs  Temp:  [97.5 °F (36.4 °C)] 97.5 °F (36.4 °C)  Heart Rate:  [74] 74  BP: (129)/(83) 129/83  Body mass index is 42.32 kg/m².      12/16/19 0919   Weight: 105 kg (231 lb 6.4 oz)       Physical Exam:  HEENT: extra ocular movement intact  Respiratory: appears well, vitals normal, no respiratory distress, acyanotic, normal RR, chest clear, no wheezing, crepitations, rhonchi, normal symmetric air entry  Cardiovascular: Regular rate and rhythm, S1, S2 normal, no murmur, click, rub or gallop  GI: Soft, non-tender, normal bowel sounds; no bruits, organomegaly or masses.  Abnormal shape: obese  Musculoskeletal: inspection - no abnormality, range of motion normal  Neurologic: alert, oriented, normal speech, no focal findings or movement disorder noted       Results Review:   None        Assessment/Plan   Encounter Diagnoses   Name  Primary?   • Class 3 severe obesity due to excess calories with serious comorbidity and body mass index (BMI) of 40.0 to 44.9 in adult (CMS/[facility]) Yes   • Gastroesophageal reflux disease, esophagitis presence not specified    • Status post laparoscopic sleeve gastrectomy          1. Cathi Krishnan and I discussed the importance of changing behavior for consistent and successful weight loss long term.  We first reviewed again the definition of a meal which is defined as portion sizes less than a half a cup and those portions incorporating a protein.  Specifically the protein should fill half of that volume.  I also explained that she should attempt to consume 4-5 meals as defined above daily and to avoid snacking or grazing.  She should also continue to be mindful of adequate hydration by consuming at least 64 oz of water daily, without eating and drinking simultaneously, and continue to incorporate a regular exercise regimen. I discussed the importance of taking her multivitamins as directed. She was given 1 year education handout. She will annual labs drawn at her yearly appointment.    Goals: increase water and protein intake, return to measuring every meal, as defined above, and return to following s/p gastric sleeve lifestyle and dietary modifications recommended. Patient was encouraged to call with questions, concerns, or obstacles as they may arise prior to next appointment. Support group attendance encouraged and schedule provided.    2. Current comorbid condition of GERD associated with her morbid obesity is reported to be stable on her current treatment regimen and medications. We anticipate the comorbid condition to improve as we address her morbid obesity.     She is to return to our office 1 year or as needed.    ARGENIS Chaves    12/16/19  9:43 AM  Patient Care Team:  Shlomo Lira MD as PCP - General (Family Medicine)

## 2020-02-04 ENCOUNTER — OFFICE VISIT (OUTPATIENT)
Dept: PSYCHIATRY | Age: 40
End: 2020-02-04
Payer: COMMERCIAL

## 2020-02-04 VITALS
WEIGHT: 230 LBS | BODY MASS INDEX: 42.33 KG/M2 | HEIGHT: 62 IN | SYSTOLIC BLOOD PRESSURE: 118 MMHG | HEART RATE: 74 BPM | DIASTOLIC BLOOD PRESSURE: 82 MMHG

## 2020-02-04 PROCEDURE — 99213 OFFICE O/P EST LOW 20 MIN: CPT | Performed by: NURSE PRACTITIONER

## 2020-02-04 RX ORDER — DULOXETIN HYDROCHLORIDE 30 MG/1
30 CAPSULE, DELAYED RELEASE ORAL DAILY
Qty: 90 CAPSULE | Refills: 1 | Status: SHIPPED | OUTPATIENT
Start: 2020-02-04 | End: 2020-07-10 | Stop reason: SDUPTHER

## 2020-02-04 RX ORDER — HYDROCODONE BITARTRATE AND ACETAMINOPHEN 5; 325 MG/1; MG/1
1 TABLET ORAL 2 TIMES DAILY
COMMUNITY

## 2020-02-04 RX ORDER — DULOXETIN HYDROCHLORIDE 60 MG/1
60 CAPSULE, DELAYED RELEASE ORAL DAILY
Qty: 90 CAPSULE | Refills: 1 | Status: SHIPPED | OUTPATIENT
Start: 2020-02-04 | End: 2020-07-10

## 2020-02-04 NOTE — PATIENT INSTRUCTIONS
Plan:  Continue:  Cymbalta, 60mg, daily in the morning  Cymbalta, 30mg, daily with the evening meal  Doxepin, 10mg, nightly as needed for sleep  Buspirone, 15mg, three times daily     Follow up: Return in about 4 months (around 6/4/2020).

## 2020-02-04 NOTE — PROGRESS NOTES
frequency of urination, urinary urgency, blood in urine incomplete bladder emptying, urinary incontinence, STD; MEN: testicular pain or swelling, erectile dysfunction; WOMEN: LMP, heavy menstrual bleeding (menorrhagia), irregular periods, postmenopausal bleeding, menstrual pain (dymenorrhea, vaginal discharge)    Musculoskeletal: (bone pain/fracture, joint pain or swelling, musle pain)    Integumentary: (rashes, acne, non-healing sores, itching, breast lumps, breast pain, nipple discharge, hair loss)    Neurologic: (HA, muscle weakness, paresthesias (numbness, coldness, crawling or prickling), memory loss, seizure, dizziness)    Psychiatric:  (anxiety, sadness, irritability/anger, insomnia, suicidality)    Endocrine: (heat or cold intolerance, excessive thirst (polydipsia), excessive hunger (polyphagia))    Immune/Allergic: (hives, seasonal or environmental allergies, HIV exposure)    Hematologic/Lymphatic: (lymph node enlargement, easy bleeding or bruising)    History obtained via chart review and patient    PCP is Nish Bhakta       Current Meds:    Prior to Admission medications    Medication Sig Start Date End Date Taking? Authorizing Provider   DULoxetine (CYMBALTA) 60 MG extended release capsule Take 1 capsule by mouth daily In the morning 2/4/20  Yes ANDRIY Martell NP   DULoxetine (CYMBALTA) 30 MG extended release capsule Take 1 capsule by mouth daily With the evening meal 2/4/20  Yes ANDRIY Martell NP   HYDROcodone-acetaminophen (NORCO) 5-325 MG per tablet Take 1 tablet by mouth 2 times daily.    Yes Historical Provider, MD   busPIRone (BUSPAR) 15 MG tablet Take 15 mg by mouth 3 times daily 10/28/19   ANDRIY Martell NP   doxepin (SINEQUAN) 10 MG capsule Take 1 capsule by mouth nightly 6/7/19   ANDRIY Martell NP   pantoprazole (PROTONIX) 40 MG tablet Take 40 mg by mouth daily    Historical Provider, MD   ondansetron (ZOFRAN) 4 MG tablet Take 1 tablet by mouth every 8 hours as needed for Nausea or Vomiting 19   Rudolph Lai MD   calcium-vitamin D (OSCAL) 250-125 MG-UNIT per tablet Take 1 tablet by mouth daily    Historical Provider, MD   DOCUSATE SODIUM PO Take 100 mg by mouth daily    Historical Provider, MD   vitamin D (ERGOCALCIFEROL) 17387 units CAPS capsule Take 5,000 Units by mouth once a week 18   Historical Provider, MD   ferrous sulfate 325 (65 Fe) MG tablet Take 325 mg by mouth daily    Historical Provider, MD     Social History     Socioeconomic History    Marital status:      Spouse name: None    Number of children: None    Years of education: None    Highest education level: None   Occupational History    None   Social Needs    Financial resource strain: None    Food insecurity:     Worry: None     Inability: None    Transportation needs:     Medical: None     Non-medical: None   Tobacco Use    Smoking status: Former Smoker     Last attempt to quit:      Years since quittin.0    Smokeless tobacco: Never Used   Substance and Sexual Activity    Alcohol use: Never     Frequency: Never    Drug use: Never    Sexual activity: None   Lifestyle    Physical activity:     Days per week: None     Minutes per session: None    Stress: None   Relationships    Social connections:     Talks on phone: None     Gets together: None     Attends Buddhist service: None     Active member of club or organization: None     Attends meetings of clubs or organizations: None     Relationship status: None    Intimate partner violence:     Fear of current or ex partner: None     Emotionally abused: None     Physically abused: None     Forced sexual activity: None   Other Topics Concern    None   Social History Narrative    SLEEP STUDY: Yes, home study, , uses a BPAP       MSE:  Patient is  A & O x 4. Appearance:  well-appearing appropriately dressed for season and age.   Cognition:  Recent memory intact , remote memory intact , good fund of knowledge,

## 2020-06-01 ENCOUNTER — VIRTUAL VISIT (OUTPATIENT)
Dept: PSYCHIATRY | Age: 40
End: 2020-06-01
Payer: COMMERCIAL

## 2020-06-01 PROCEDURE — 99442 PR PHYS/QHP TELEPHONE EVALUATION 11-20 MIN: CPT | Performed by: NURSE PRACTITIONER

## 2020-06-01 RX ORDER — DOXEPIN HYDROCHLORIDE 10 MG/1
10 CAPSULE ORAL NIGHTLY
Qty: 90 CAPSULE | Refills: 1 | Status: SHIPPED | OUTPATIENT
Start: 2020-06-01 | End: 2020-12-22 | Stop reason: SDUPTHER

## 2020-06-01 NOTE — PROGRESS NOTES
Meds:    Prior to Admission medications    Medication Sig Start Date End Date Taking? Authorizing Provider   doxepin (SINEQUAN) 10 MG capsule Take 1 capsule by mouth nightly 20  Yes ANDRIY Ramos NP   DULoxetine (CYMBALTA) 60 MG extended release capsule Take 1 capsule by mouth daily In the morning 20   ANDRIY Ramos NP   DULoxetine (CYMBALTA) 30 MG extended release capsule Take 1 capsule by mouth daily With the evening meal 20   ANDRIY Ramos NP   HYDROcodone-acetaminophen (NORCO) 5-325 MG per tablet Take 1 tablet by mouth 2 times daily.     Historical Provider, MD   busPIRone (BUSPAR) 15 MG tablet Take 15 mg by mouth 3 times daily 10/28/19   ANDRIY Ramos NP   pantoprazole (PROTONIX) 40 MG tablet Take 40 mg by mouth daily    Historical Provider, MD   ondansetron (ZOFRAN) 4 MG tablet Take 1 tablet by mouth every 8 hours as needed for Nausea or Vomiting 19   Esthela Tenorio MD   calcium-vitamin D (OSCAL) 250-125 MG-UNIT per tablet Take 1 tablet by mouth daily    Historical Provider, MD   DOCUSATE SODIUM PO Take 100 mg by mouth daily    Historical Provider, MD   vitamin D (ERGOCALCIFEROL) 12946 units CAPS capsule Take 5,000 Units by mouth once a week 18   Historical Provider, MD   ferrous sulfate 325 (65 Fe) MG tablet Take 325 mg by mouth daily    Historical Provider, MD     Social History     Socioeconomic History    Marital status:      Spouse name: None    Number of children: None    Years of education: None    Highest education level: None   Occupational History    None   Social Needs    Financial resource strain: None    Food insecurity     Worry: None     Inability: None    Transportation needs     Medical: None     Non-medical: None   Tobacco Use    Smoking status: Former Smoker     Last attempt to quit: 2009     Years since quittin.4    Smokeless tobacco: Never Used   Substance and Sexual Activity    Alcohol use: Never     Frequency: 4 months. 10.Over 50% of the total visit time of 20  minutes was spent on counseling and/or coordination of care of:                        1. Mild episode of recurrent major depressive disorder ([facility] [address]    2. Generalized anxiety disorder with panic attacks    3.  Insomnia due to other mental disorder                      Psychotherapy Topics: mood/medication effectiveness       Thomas Michelle PMHNP-BC

## 2020-06-01 NOTE — PATIENT INSTRUCTIONS
conditions. It is not certain whether melatonin is effective in treating any medical condition. Medicinal use of this product has not been approved by the FDA. Melatonin should not be used in place of medication prescribed for you by your doctor. Melatonin is often sold as an herbal supplement. There are no regulated manufacturing standards in place for many herbal compounds and some marketed supplements have been found to be contaminated with toxic metals or other drugs. Herbal/health supplements should be purchased from a reliable source to minimize the risk of contamination. Melatonin may also be used for purposes not listed in this product guide. What should I discuss with my health care provider before taking melatonin? You should not use melatonin if you are allergic to it. Before using melatonin, talk to your healthcare provider. You may not be able to use melatonin if you have certain medical conditions, especially:  · diabetes;  · depression;  · a bleeding or blood clotting disorder such as hemophilia;  · high or low blood pressure;  · epilepsy or other seizure disorder; or  · if you are using any medicine to prevent organ transplant rejection. Ask a doctor before using this product if you are pregnant or breastfeeding. High doses of melatonin may affect ovulation, making it difficult for you to get pregnant. Do not give any herbal/health supplement to a child without medical advice. How should I take melatonin? When considering the use of herbal supplements, seek the advice of your doctor. You may also consider consulting a practitioner who is trained in the use of herbal/health supplements. If you choose to use melatonin, use it as directed on the package or as directed by your doctor, pharmacist, or other healthcare provider. Do not use more of this product than is recommended on the label. Use the lowest dose of melatonin when you first start taking this product.   Take melatonin at bedtime, or when you are getting ready for sleep. If you use this product to treat jet lag, take the melatonin at bedtime on the day you arrive at your destination and keep using this product for 2 to 5 days. If you take this product to treat other conditions not related to sleep, follow your healthcare provider's instructions about when and how to take melatonin. Do not crush, chew, or break an extended-release tablet. Swallow it whole. Do not swallow the orally disintegrating tablet whole. Allow it to dissolve in your mouth without chewing. If desired, you may drink liquid to help swallow the dissolved tablet. Measure liquid medicine carefully. Use the dosing syringe provided, or use a medicine dose-measuring device (not a kitchen spoon). Call your doctor if the condition you are treating with melatonin does not improve, or if it gets worse while using this product. Store at room temperature away from moisture and heat. What happens if I miss a dose? Since melatonin is used when needed, you may not be on a dosing schedule. Skip any missed dose if it's almost time for your next dose. Do not use two doses at one time. What happens if I overdose? Seek emergency medical attention or call the Poison Help line at 1-197.689.6301. What should I avoid while taking melatonin? Melatonin may impair your thinking or reactions. Avoid driving or operating machinery for a least 4 hours after taking melatonin. This product may also affect your sleep-wake cycle for several days if you are traveling through many different time zones. Avoid using melatonin with other herbal/health supplements. Melatonin and many other herbal products can increase your risk of bleeding, seizures, or low blood pressure. Using certain products together can increase these risks. Avoid coffee, tea, cola, energy drinks, or other products that contain caffeine. What are the possible side effects of melatonin?   Get emergency medical help if interactions are listed here. Where can I get more information? Your doctor, pharmacist, or health care provider may have more information about melatonin. Remember, keep this and all other medicines out of the reach of children, never share your medicines with others, and use this medication only for the indication prescribed. Every effort has been made to ensure that the information provided by [facility] [address] is accurate, up-to-date, and complete, but no guarantee is made to that effect. Drug information contained herein may be time sensitive. [facility] information has been compiled for use by healthcare practitioners and consumers in the [place] and therefore [facility] does not warrant that uses outside of the [place] are appropriate, unless specifically indicated otherwise. [facility]'s drug information does not endorse drugs, diagnose patients or recommend therapy. [facility]'s drug information is an informational resource designed to assist licensed healthcare practitioners in caring for their patients and/or to serve consumers viewing this service as a supplement to, and not a substitute for, the expertise, skill, knowledge and judgment of healthcare practitioners. The absence of a warning for a given drug or drug combination in no way should be construed to indicate that the drug or drug combination is safe, effective or appropriate for any given patient. [facility] does not assume any responsibility for any aspect of healthcare administered with the aid of information [facility] provides. The information contained herein is not intended to cover all possible uses, directions, precautions, warnings, drug interactions, allergic reactions, or adverse effects. If you have questions about the drugs you are taking, check with your doctor, nurse or pharmacist.  Copyright 9916-6011 [address] [place] [address] Dr GARY. Version: 3.06. Revision date: 4/17/2019. Care instructions adapted under license by [facility] ([facility]).  If

## 2020-07-10 RX ORDER — DULOXETIN HYDROCHLORIDE 30 MG/1
30 CAPSULE, DELAYED RELEASE ORAL DAILY
Qty: 90 CAPSULE | Refills: 1 | Status: SHIPPED | OUTPATIENT
Start: 2020-07-10 | End: 2020-12-22 | Stop reason: SDUPTHER

## 2020-07-10 RX ORDER — DULOXETIN HYDROCHLORIDE 60 MG/1
CAPSULE, DELAYED RELEASE ORAL
Qty: 90 CAPSULE | Refills: 1 | Status: SHIPPED | OUTPATIENT
Start: 2020-07-10 | End: 2020-12-22 | Stop reason: SDUPTHER

## 2020-07-10 NOTE — TELEPHONE ENCOUNTER
Pharmacy requesting refill request for patient Radha Hoffman      Last office visit : 6/01/2020   Next office visit : 9/24/2020     Requested Prescriptions     Pending Prescriptions Disp Refills    DULoxetine (CYMBALTA) 60 MG extended release capsule [Pharmacy Med Name: DULOXETINE HCL 60MG CPEP] 90 capsule 1     Sig: TAKE ONE CAPSULE BY MOUTH EVERY DAY IN THE MORNING            Alicia Bowen  Virtual Visit     6/1/2020  [address] [facility]   ANDRIY Chappell - NP   Nurse Practitioner Psychiatric/Mental Health   Mild episode of recurrent major depressive disorder ([facility] [address] +2 more   Dx   Follow-up , Anxiety , Depression , Panic Attack , Insomnia   Reason for Visit    Progress Notes              Scan on 6/1/2020 9:03 AM by Alicia Bowen: [email or web address] on 6/1/2020 9:03 AM by Alicia Bowen: [email or web address]    Progress Notes     Expand All Collapse All  []Expand All by Default  Radha Hoffman is a 44 y.o. female evaluated via telephone on 6/1/2020.       Consent:  She and/or health care decision maker is aware that that she may receive a bill for this telephone service, depending on her insurance coverage, and has provided verbal consent to proceed: Yes        Documentation:  I communicated with the patient and/or health care decision maker about anxiety, depression.    Details of this discussion including any medical advice provided: see below        I affirm this is a Patient Initiated Episode with a Patient who has not had a related appointment within my department in the past 7 days or scheduled within the next 24 hours.     Patient identification was verified at the start of the visit: Yes     Total Time: minutes: 11-20 minutes     Note: not billable if this call serves to triage the patient into an appointment for the relevant concern        Jude Barrera   6/1/2020 9:03 AM                               Progress Note     IN:  0830  OUT: [address] 1980 average  intelligence level. Speech:  normal  Language: Naming: intact; Word Finding: intact  Conversation no evidence of delusions  Behavior:  Cooperative  Mood: euthymic  Affect: HALIMA  Thought Content: negative delusions, negative hallucinations, negative obsessions,  negativehomicidal and negative suicidal   Thought Process: linear, goal directed and coherent  Associations: logical connections  Attention Span and concentration: Normal   Judgement Insight:  normal and appropriate  Gait and Station: HALIMA   Sleep: difficulty falling asleep, reports that she doesn't fall asleep until around midnight and is up again around 5am.   Appetite: ok        No results found for: NA, K, CL, CO2, BUN, CREATININE, GLUCOSE, CALCIUM, PROT, LABALBU, BILITOT, ALKPHOS, AST, ALT, LABGLOM, GFRAA, AGRATIO, GLOB  No results found for: NA, K, CL, CO2, BUN, CREATININE, GLUCOSE, CALCIUM   No results found for: CHOL  No results found for: TRIG  No results found for: HDL  No results found for: LDLCHOLESTEROL, LDLCALC  No results found for: LABVLDL, VLDL  No results found for: CHOLHDLRATIO  No results found for: LABA1C  No results found for: EAG  No results found for: TSHFT4, TSH  No results found for: VITD25     Assessments Administered:     PHQ9: 9, mild  GAD7: 10, mild     Assessment:    1. [facility]    2. Generalized anxiety disorder with panic attacks    3. Insomnia due to other mental disorder          Plan:  Continue:  Cymbalta, 60mg, daily in the morning  Cymbalta, 30mg, daily with the evening meal  Doxepin, 10mg, nightly as needed for sleep  Buspirone, 15mg, three times daily      Start:  Melatonin, 3mg, nightly     Follow up: Return in about 4 months (around 10/1/2020).     1. The risks, benefits, side effects, indications, contraindications, and adverse effects of the medications have been discussed.  Yes.  2. The pt has verbalized understanding and has capacity to give informed (around 10/1/2020).    Patient Education        melatonin  Pronunciation:  ernestine yany TOE jesenia  Brand:  Advanced Sleep Melatonin, Dual Spectrum Melatonin, Melatonin Time Release, Nature's Bounty Dual Spectrum Melatonin  What is the most important information I should know about melatonin? Follow all directions on the product label and package. Tell each of your healthcare providers about all your medical conditions, allergies, and all medicines you use. What is melatonin? Melatonin is a manmade form of a hormone produced in the brain that helps regulate your sleep and wake cycle. Melatonin has been used in alternative medicine as a likely effective aid in treating insomnia (trouble falling asleep or staying asleep). Melatonin is also likely effective in treating sleep disorders in people who are blind. Melatonin is also possibly effective in treating jet lag, high blood pressure, tumors, low blood platelets (blood cells that help your blood to clot), insomnia caused by withdrawal from drug addiction, or anxiety caused by surgery. A topical form of melatonin applied to the skin is possibly effective in preventing sunburn. Melatonin has also been used to treat infertility, to improve sleep problems caused by shift work, or to enhance athletic performance. However, research has shown that melatonin may not be effective in treating these conditions. Other uses not proven with research have included treating depression, bipolar disorder, dementia, macular degeneration, attention deficit hyperactivity disorder, enlarged prostate, chronic fatigue syndrome, fibromyalgia, restless leg syndrome, stomach ulcers, irritable bowel syndrome, nicotine withdrawal, and many other conditions. It is not certain whether melatonin is effective in treating any medical condition. Medicinal use of this product has not been approved by the FDA.  Melatonin should not be used in place of medication prescribed for you by your other conditions not related to sleep, follow your healthcare provider's instructions about when and how to take melatonin. Do not crush, chew, or break an extended-release tablet. Swallow it whole. Do not swallow the orally disintegrating tablet whole. Allow it to dissolve in your mouth without chewing. If desired, you may drink liquid to help swallow the dissolved tablet. Measure liquid medicine carefully. Use the dosing syringe provided, or use a medicine dose-measuring device (not a kitchen spoon). Call your doctor if the condition you are treating with melatonin does not improve, or if it gets worse while using this product. Store at room temperature away from moisture and heat. What happens if I miss a dose? Since melatonin is used when needed, you may not be on a dosing schedule. Skip any missed dose if it's almost time for your next dose. Do not use two doses at one time. What happens if I overdose? Seek emergency medical attention or call the Poison Help line at 1-758.588.3595. What should I avoid while taking melatonin? Melatonin may impair your thinking or reactions. Avoid driving or operating machinery for a least 4 hours after taking melatonin. This product may also affect your sleep-wake cycle for several days if you are traveling through many different time zones. Avoid using melatonin with other herbal/health supplements. Melatonin and many other herbal products can increase your risk of bleeding, seizures, or low blood pressure. Using certain products together can increase these risks. Avoid coffee, tea, cola, energy drinks, or other products that contain caffeine. What are the possible side effects of melatonin? Get emergency medical help if you have signs of an allergic reaction: hives; difficult breathing; swelling of your face, lips, tongue, or throat.   Although not all side effects are known, melatonin is thought to be possibly safe when taken for a short period of time (up to 2 years in some people). Common side effects may include:  · daytime drowsiness;  · depressed mood, feeling irritable;  · stomach pain;  · headache; or  · dizziness. This is not a complete list of side effects and others may occur. Call your doctor for medical advice about side effects. You may report side effects to FDA at 4-007-FDA-4323. What other drugs will affect melatonin? Using melatonin with other drugs that make you drowsy can worsen this effect. Ask your doctor before using opioid medication, a sleeping pill, a muscle relaxer, medicine for anxiety or seizures, or herbal/health supplements may also cause drowsiness (tryptophan, [place] poppy, chamomile, gotu thierry, kava, [place]'s wort, skullcap, valerian, and others). Do not take melatonin without medical advice if you are using any of the following medications:  · an antibiotic;  · aspirin or acetaminophen (Tylenol);  · birth control pills;  · insulin or oral diabetes medicine;  · narcotic pain medicine;  · stomach medicine --lansoprazole (Prevacid), omeprazole (Prilosec), ondansetron (Zofran);  · ADHD medication- -methylphenidate, Adderall, Ritalin, and others;  · heart or blood pressure medicine --mexiletine, propranolol, verapamil;  · medicine to treat or prevent blood clots --clopidogrel (Plavix), warfarin (Coumadin, Jantoven);  · NSAIDs (nonsteroidal anti-inflammatory drugs) --ibuprofen (Advil, Motrin), naproxen (Aleve), celecoxib, diclofenac, indomethacin, meloxicam, and others; or  · steroid medicine --prednisone, and others. This list is not complete. Other drugs may affect melatonin, including prescription and over-the-counter medicines, vitamins, and herbal products. Not all possible drug interactions are listed here. Where can I get more information? Your doctor, pharmacist, or health care provider may have more information about melatonin.   Remember, keep this and all other medicines out of the reach of children, never share your medicines with others, and use this medication only for the indication prescribed. Every effort has been made to ensure that the information provided by [facility] [address] is accurate, up-to-date, and complete, but no guarantee is made to that effect. Drug information contained herein may be time sensitive. [facility] information has been compiled for use by healthcare practitioners and consumers in the [place] and therefore [facility] does not warrant that uses outside of the [place] are appropriate, unless specifically indicated otherwise. [facility]'s drug information does not endorse drugs, diagnose patients or recommend therapy. [facility]'s drug information is an informational resource designed to assist licensed healthcare practitioners in caring for their patients and/or to serve consumers viewing this service as a supplement to, and not a substitute for, the expertise, skill, knowledge and judgment of healthcare practitioners. The absence of a warning for a given drug or drug combination in no way should be construed to indicate that the drug or drug combination is safe, effective or appropriate for any given patient. [facility] does not assume any responsibility for any aspect of healthcare administered with the aid of information [facility] provides. The information contained herein is not intended to cover all possible uses, directions, precautions, warnings, drug interactions, allergic reactions, or adverse effects. If you have questions about the drugs you are taking, check with your doctor, nurse or pharmacist.  Copyright 7113-7359 [address] [place]. Version: 3.06. Revision date: 4/17/2019. Care instructions adapted under license by [facility] ([facility]).  If you have questions about a medical condition or this instruction, always ask your healthcare professional. [place] [address] any warranty or liability for your use of this information.              After Visit Summary (Printed

## 2020-09-28 RX ORDER — BUSPIRONE HYDROCHLORIDE 15 MG/1
TABLET ORAL
Qty: 90 TABLET | Refills: 3 | Status: SHIPPED | OUTPATIENT
Start: 2020-09-28 | End: 2020-12-22 | Stop reason: SDUPTHER

## 2020-09-28 NOTE — TELEPHONE ENCOUNTER
Pharmacy requesting refill request for patient Norman Holloway      Last office visit : 6/1/2020   Next office visit : 12/28/2020     Requested Prescriptions     Pending Prescriptions Disp Refills    busPIRone (BUSPAR) 15 MG tablet [Pharmacy Med Name: BUSPIRONE HCL 15MG TABS] 90 tablet 3     Sig: TAKE ONE TABLET BY MOUTH THREE TIMES A DAY            Mayra Webb  Virtual Visit     6/1/2020  [address] [facility]   Dank Lucas, APRN - NP   Nurse Practitioner Psychiatric/Mental Health   Mild episode of recurrent major depressive disorder ([facility] [address] +2 more   Dx   Follow-up , Anxiety , Depression , Panic Attack , Insomnia   Reason for Visit    Progress Notes              Scan on 6/1/2020 9:03 AM by Mayra Webb: [email or web address] on 6/1/2020 9:03 AM by Mayra Webb: [email or web address]    Progress Notes     Expand All Collapse All  []Expand All by Default  Norman Holloway is a 44 y.o. female evaluated via telephone on 6/1/2020.       Consent:  She and/or health care decision maker is aware that that she may receive a bill for this telephone service, depending on her insurance coverage, and has provided verbal consent to proceed: Yes        Documentation:  I communicated with the patient and/or health care decision maker about anxiety, depression.    Details of this discussion including any medical advice provided: see below        I affirm this is a Patient Initiated Episode with a Patient who has not had a related appointment within my department in the past 7 days or scheduled within the next 24 hours.     Patient identification was verified at the start of the visit: Yes     Total Time: minutes: 11-20 minutes     Note: not billable if this call serves to triage the patient into an appointment for the relevant concern        Dank Lucas   6/1/2020 9:03 AM                               Progress Note     IN:  [address]  OUT: [address] 1980                       Chief Complaint   Patient presents with    Follow-up    Anxiety    Depression    Panic Attack    Insomnia            Subjective:  Patient is a 44 y.o. female diagnosed with MDD and presents today for follow-up. Last seen in clinic on 2/4/20 and prior records were reviewed.     Today patient states, \"Yeah, I've been down a little bit. \" She reports that this is from staying home. She says that she had a panic attack last week. She says that it lasted for 10-15 min. She says that she is now trying to get out and walk. 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      Patient reports side effects as follows: none. No evidence of EPS, no cogwheeling or abnormal motor movements.     Absent  suicidal ideation.   Reports compliance with medications as good .      Current Substance Use:  See history     BP: There were no vitals taken for this visit.        Review of Systems - 14 point review:  Negative except being treated for: anxiety, depression, insomnia, chronic back and knee pain, acid reflux, sleep apnea (BPAP)        Constitutional: (fevers, chills, night sweats, wt loss/gain, change in appetite, fatigue, somnolence)     HEENT: (ear pain or discharge, hearing loss, ear ringing, sinus pressure, nosebleed, nasal discharge, sore throat, oral sores, tooth pain, bleeding gums, hoarse voice, neck pain)      Cardiovascular: (HTN, chest pain, elevated cholesterol/lipids, palpitations, leg swelling, leg pain with walking)     Respiratory: (cough, wheezing, snoring, SOB with activity (dyspnea), SOB while lying flat (orthopnea), awakening with severe SOB (paroxysmal nocturnal dyspnea))     Gastrointestinal: (NVD, constipation, abdominal pain, bright red stools, black tarry stools, stool incontinence)     Genitourinary:  (pelvic pain, burning or frequency of urination, urinary urgency, blood in urine incomplete bladder emptying, urinary incontinence, STD; MEN: testicular pain or swelling, erectile dysfunction; WOMEN: LMP, heavy menstrual bleeding (menorrhagia), irregular periods, postmenopausal bleeding, menstrual pain (dymenorrhea, vaginal discharge)     Musculoskeletal: (bone pain/fracture, joint pain or swelling, musle pain)     Integumentary: (rashes, acne, non-healing sores, itching, breast lumps, breast pain, nipple discharge, hair loss)     Neurologic: (HA, muscle weakness, paresthesias (numbness, coldness, crawling or prickling), memory loss, seizure, dizziness)     Psychiatric:  (anxiety, sadness, irritability/anger, insomnia, suicidality)     Endocrine: (heat or cold intolerance, excessive thirst (polydipsia), excessive hunger (polyphagia))     Immune/Allergic: (hives, seasonal or environmental allergies, HIV exposure)     Hematologic/Lymphatic: (lymph node enlargement, easy bleeding or bruising)     History obtained via chart review and patient     PCP is Diamond Toribio         Current Meds:     Home Medications           Prior to Admission medications    Medication Sig Start Date End Date Taking?  Authorizing Provider   doxepin (SINEQUAN) 10 MG capsule Take 1 capsule by mouth nightly 6/1/20   Yes ANDRIY Lindquist NP   DULoxetine (CYMBALTA) 60 MG extended release capsule Take 1 capsule by mouth daily In the morning 2/4/20     ANDRIY Lindquist NP   DULoxetine (CYMBALTA) 30 MG extended release capsule Take 1 capsule by mouth daily With the evening meal 2/4/20     ANDRIY Lindquist NP   HYDROcodone-acetaminophen (NORCO) 5-325 MG per tablet Take 1 tablet by mouth 2 times daily.       Historical Provider, MD   busPIRone (BUSPAR) 15 MG tablet Take 15 mg by mouth 3 times daily 10/28/19     ANDRIY Lindquist NP   pantoprazole (PROTONIX) 40 MG tablet Take 40 mg by mouth daily       Historical Provider, MD   ondansetron (ZOFRAN) 4 MG tablet Take 1 tablet by mouth every 8 hours as needed for Nausea or Vomiting 1/17/19     Madai Ravi MD calcium-vitamin D (OSCAL) 250-125 MG-UNIT per tablet Take 1 tablet by mouth daily       Historical Provider, MD   DOCUSATE SODIUM PO Take 100 mg by mouth daily       Historical Provider, MD   vitamin D (ERGOCALCIFEROL) 50895 units CAPS capsule Take 5,000 Units by mouth once a week 18     Historical Provider, MD   ferrous sulfate 325 (65 Fe) MG tablet Take 325 mg by mouth daily       Historical Provider, MD        Social History   Social History            Socioeconomic History    Marital status:        Spouse name: None    Number of children: None    Years of education: None    Highest education level: None   Occupational History    None   Social Needs    Financial resource strain: None    Food insecurity       Worry: None       Inability: None    Transportation needs       Medical: None       Non-medical: None   Tobacco Use    Smoking status: Former Smoker       Last attempt to quit:        Years since quittin.4    Smokeless tobacco: Never Used   Substance and Sexual Activity    Alcohol use: Never       Frequency: Never    Drug use: Never    Sexual activity: None   Lifestyle    Physical activity       Days per week: None       Minutes per session: None    Stress: None   Relationships    Social connections       Talks on phone: None       Gets together: None       Attends Evangelical service: None       Active member of club or organization: None       Attends meetings of clubs or organizations: None       Relationship status: None    Intimate partner violence       Fear of current or ex partner: None       Emotionally abused: None       Physically abused: None       Forced sexual activity: None   Other Topics Concern    None   Social History Narrative     SLEEP STUDY: Yes, home study, , uses a BPAP           MSE:  Patient is  A & O x 4. Appearance:  HALIMA  Cognition:  Recent memory intact , remote memory intact , good fund of knowledge, average  intelligence level. Speech:  normal  Language: Naming: intact; Word Finding: intact  Conversation no evidence of delusions  Behavior:  Cooperative  Mood: euthymic  Affect: HALIMA  Thought Content: negative delusions, negative hallucinations, negative obsessions,  negativehomicidal and negative suicidal   Thought Process: linear, goal directed and coherent  Associations: logical connections  Attention Span and concentration: Normal   Judgement Insight:  normal and appropriate  Gait and Station: HALIMA   Sleep: difficulty falling asleep, reports that she doesn't fall asleep until around midnight and is up again around 5am.   Appetite: ok        No results found for: NA, K, CL, CO2, BUN, CREATININE, GLUCOSE, CALCIUM, PROT, LABALBU, BILITOT, ALKPHOS, AST, ALT, LABGLOM, GFRAA, AGRATIO, GLOB  No results found for: NA, K, CL, CO2, BUN, CREATININE, GLUCOSE, CALCIUM   No results found for: CHOL  No results found for: TRIG  No results found for: HDL  No results found for: LDLCHOLESTEROL, LDLCALC  No results found for: LABVLDL, VLDL  No results found for: CHOLHDLRATIO  No results found for: LABA1C  No results found for: EAG  No results found for: TSHFT4, TSH  No results found for: VITD25     Assessments Administered:     PHQ9: 9, mild  GAD7: 10, mild     Assessment:    1. Mild episode of recurrent major depressive disorder ([facility] [address]    2. Generalized anxiety disorder with panic attacks    3. Insomnia due to other mental disorder          Plan:  Continue:  Cymbalta, 60mg, daily in the morning  Cymbalta, 30mg, daily with the evening meal  Doxepin, 10mg, nightly as needed for sleep  Buspirone, 15mg, three times daily      Start:  Melatonin, 3mg, nightly     Follow up: Return in about 4 months (around 10/1/2020).     1. The risks, benefits, side effects, indications, contraindications, and adverse effects of the medications have been discussed. Yes.  2. The pt has verbalized understanding and has capacity to give informed consent.   3. The Aroldo Tam report has been reviewed according to [facility] regulations. 4. Supportive therapy offered. 5. Follow up:    Return in about 4 months (around 10/1/2020). 6. The patient has been advised to call with any problems. 7. Controlled substance Treatment Plan: none. 8. The above listed medications have been continued, modifications in meds and other orders/labs as follows:                 Encounter Medications         Orders Placed This Encounter   Medications    doxepin (SINEQUAN) 10 MG capsule       Sig: Take 1 capsule by mouth nightly       Dispense:  90 capsule       Refill:  1                    No orders of the defined types were placed in this encounter.        9. Additional comments: She reports that her medications are still working. She says that her mood is mostly affected by having to stay home. She does not want to make any medication changes at this time. Did recommend that she try using Melatonin for sleep initiation. She says that she has trouble falling asleep, has anxious, racing thoughts that keep her awake. She says that she goes to sleep around midnight and wakes up again around 5am. She reports that she is using her Bipap. Will follow up in about 4 months.        10. Over 50% of the total visit time of 20  minutes was spent on counseling and/or coordination of care of:                         1. Mild episode of recurrent major depressive disorder ([facility] [address]    2. Generalized anxiety disorder with panic attacks    3. Insomnia due to other mental disorder                        Psychotherapy Topics: mood/medication effectiveness         Kacielorie Sheppard [facility]      Instructions         Return in about 4 months (around 10/1/2020). Plan:  Continue:  Cymbalta, 60mg, daily in the morning  Cymbalta, 30mg, daily with the evening meal  Doxepin, 10mg, nightly as needed for sleep  Buspirone, 15mg, three times daily      Start:  Melatonin, 3mg, nightly     Follow up: Return in about 4 months (around 10/1/2020).      Patient your healthcare provider's instructions about when and how to take melatonin. Do not crush, chew, or break an extended-release tablet. Swallow it whole. Do not swallow the orally disintegrating tablet whole. Allow it to dissolve in your mouth without chewing. If desired, you may drink liquid to help swallow the dissolved tablet. Measure liquid medicine carefully. Use the dosing syringe provided, or use a medicine dose-measuring device (not a kitchen spoon). Call your doctor if the condition you are treating with melatonin does not improve, or if it gets worse while using this product. Store at room temperature away from moisture and heat. What happens if I miss a dose? Since melatonin is used when needed, you may not be on a dosing schedule. Skip any missed dose if it's almost time for your next dose. Do not use two doses at one time. What happens if I overdose? Seek emergency medical attention or call the Poison Help line at 1-520.421.4570. What should I avoid while taking melatonin? Melatonin may impair your thinking or reactions. Avoid driving or operating machinery for a least 4 hours after taking melatonin. This product may also affect your sleep-wake cycle for several days if you are traveling through many different time zones. Avoid using melatonin with other herbal/health supplements. Melatonin and many other herbal products can increase your risk of bleeding, seizures, or low blood pressure. Using certain products together can increase these risks. Avoid coffee, tea, cola, energy drinks, or other products that contain caffeine. What are the possible side effects of melatonin? Get emergency medical help if you have signs of an allergic reaction: hives; difficult breathing; swelling of your face, lips, tongue, or throat. Although not all side effects are known, melatonin is thought to be possibly safe when taken for a short period of time (up to 2 years in some people).   Common side effects may include:  · daytime drowsiness;  · depressed mood, feeling irritable;  · stomach pain;  · headache; or  · dizziness. This is not a complete list of side effects and others may occur. Call your doctor for medical advice about side effects. You may report side effects to FDA at 6-711-FDA-8633. What other drugs will affect melatonin? Using melatonin with other drugs that make you drowsy can worsen this effect. Ask your doctor before using opioid medication, a sleeping pill, a muscle relaxer, medicine for anxiety or seizures, or herbal/health supplements may also cause drowsiness (tryptophan, [place] poppy, chamomile, gotu thierry, kava, Herminio's wort, skullcap, valerian, and others). Do not take melatonin without medical advice if you are using any of the following medications:  · an antibiotic;  · aspirin or acetaminophen (Tylenol);  · birth control pills;  · insulin or oral diabetes medicine;  · narcotic pain medicine;  · stomach medicine --lansoprazole (Prevacid), omeprazole (Prilosec), ondansetron (Zofran);  · ADHD medication- -methylphenidate, Adderall, Ritalin, and others;  · heart or blood pressure medicine --mexiletine, propranolol, verapamil;  · medicine to treat or prevent blood clots --clopidogrel (Plavix), warfarin (Coumadin, Jantoven);  · NSAIDs (nonsteroidal anti-inflammatory drugs) --ibuprofen (Advil, Motrin), naproxen (Aleve), celecoxib, diclofenac, indomethacin, meloxicam, and others; or  · steroid medicine --prednisone, and others. This list is not complete. Other drugs may affect melatonin, including prescription and over-the-counter medicines, vitamins, and herbal products. Not all possible drug interactions are listed here. Where can I get more information? Your doctor, pharmacist, or health care provider may have more information about melatonin.   Remember, keep this and all other medicines out of the reach of children, never share your medicines with others, and use this medication only for the indication prescribed. Every effort has been made to ensure that the information provided by [address] is accurate, up-to-date, and complete, but no guarantee is made to that effect. Drug information contained herein may be time sensitive. [facility] information has been compiled for use by healthcare practitioners and consumers in the [place] and therefore [facility] does not warrant that uses outside of the [place] are appropriate, unless specifically indicated otherwise. [facility]'s drug information does not endorse drugs, diagnose patients or recommend therapy. [facility]'s drug information is an informational resource designed to assist licensed healthcare practitioners in caring for their patients and/or to serve consumers viewing this service as a supplement to, and not a substitute for, the expertise, skill, knowledge and judgment of healthcare practitioners. The absence of a warning for a given drug or drug combination in no way should be construed to indicate that the drug or drug combination is safe, effective or appropriate for any given patient. [facility] does not assume any responsibility for any aspect of healthcare administered with the aid of information [facility] provides. The information contained herein is not intended to cover all possible uses, directions, precautions, warnings, drug interactions, allergic reactions, or adverse effects. If you have questions about the drugs you are taking, check with your doctor, nurse or pharmacist.  Copyright 9752-8048 [address] [place] [address] Dr GARY. Version: 3.06. Revision date: 4/17/2019. Care instructions adapted under license by [facility] ([facility]).  If you have questions about a medical condition or this instruction, always ask your healthcare professional. [place] [address] any warranty or liability for your use of this information.              After Visit Summary (Printed 6/1/2020)   Additional Documentation     Encounter Info:     Billing Info,    Allergies,    Detailed Report,    History,    Medications,    Questionnaires       Media     Scan on 6/1/2020 9:03 AM by Gustabo Ax: [email or web address] on 6/1/2020 9:03 AM by Gustabo Ax: [email or web address]    Chart Review Routing History     No routing history on file.    Encounter Status     Closed by ANDRIY Hoffman NP on 6/1/20 at 09:04    Orders Placed      None   Medication Changes        None      Medication List     Visit Diagnoses         Mild episode of recurrent major depressive disorder ([facility] [address]      Generalized anxiety disorder with panic attacks      Insomnia due to other mental disorder      Problem List

## 2020-12-22 ENCOUNTER — VIRTUAL VISIT (OUTPATIENT)
Dept: PSYCHIATRY | Age: 40
End: 2020-12-22
Payer: COMMERCIAL

## 2020-12-22 PROCEDURE — 99442 PR PHYS/QHP TELEPHONE EVALUATION 11-20 MIN: CPT | Performed by: NURSE PRACTITIONER

## 2020-12-22 RX ORDER — DULOXETIN HYDROCHLORIDE 30 MG/1
30 CAPSULE, DELAYED RELEASE ORAL DAILY
Qty: 90 CAPSULE | Refills: 1 | Status: SHIPPED | OUTPATIENT
Start: 2020-12-22 | End: 2021-06-10 | Stop reason: SDUPTHER

## 2020-12-22 RX ORDER — BUSPIRONE HYDROCHLORIDE 15 MG/1
TABLET ORAL
Qty: 270 TABLET | Refills: 1 | Status: SHIPPED | OUTPATIENT
Start: 2020-12-22 | End: 2021-09-27 | Stop reason: SDUPTHER

## 2020-12-22 RX ORDER — DULOXETIN HYDROCHLORIDE 60 MG/1
CAPSULE, DELAYED RELEASE ORAL
Qty: 90 CAPSULE | Refills: 1 | Status: SHIPPED | OUTPATIENT
Start: 2020-12-22 | End: 2021-06-10

## 2020-12-22 RX ORDER — DOXEPIN HYDROCHLORIDE 10 MG/1
10 CAPSULE ORAL NIGHTLY
Qty: 90 CAPSULE | Refills: 1 | Status: SHIPPED | OUTPATIENT
Start: 2020-12-22 | End: 2021-09-27 | Stop reason: SDUPTHER

## 2020-12-22 ASSESSMENT — PATIENT HEALTH QUESTIONNAIRE - PHQ9
4. FEELING TIRED OR HAVING LITTLE ENERGY: 3
10. IF YOU CHECKED OFF ANY PROBLEMS, HOW DIFFICULT HAVE THESE PROBLEMS MADE IT FOR YOU TO DO YOUR WORK, TAKE CARE OF THINGS AT HOME, OR GET ALONG WITH OTHER PEOPLE: 1
SUM OF ALL RESPONSES TO PHQ9 QUESTIONS 1 & 2: 0
9. THOUGHTS THAT YOU WOULD BE BETTER OFF DEAD, OR OF HURTING YOURSELF: 0
5. POOR APPETITE OR OVEREATING: 2
2. FEELING DOWN, DEPRESSED OR HOPELESS: 0
8. MOVING OR SPEAKING SO SLOWLY THAT OTHER PEOPLE COULD HAVE NOTICED. OR THE OPPOSITE, BEING SO FIGETY OR RESTLESS THAT YOU HAVE BEEN MOVING AROUND A LOT MORE THAN USUAL: 0
1. LITTLE INTEREST OR PLEASURE IN DOING THINGS: 0
3. TROUBLE FALLING OR STAYING ASLEEP: 3
SUM OF ALL RESPONSES TO PHQ QUESTIONS 1-9: 9
6. FEELING BAD ABOUT YOURSELF - OR THAT YOU ARE A FAILURE OR HAVE LET YOURSELF OR YOUR FAMILY DOWN: 0
SUM OF ALL RESPONSES TO PHQ QUESTIONS 1-9: 9
7. TROUBLE CONCENTRATING ON THINGS, SUCH AS READING THE NEWSPAPER OR WATCHING TELEVISION: 1
SUM OF ALL RESPONSES TO PHQ QUESTIONS 1-9: 9

## 2020-12-22 NOTE — PROGRESS NOTES
Taking? Authorizing Provider   DULoxetine (CYMBALTA) 60 MG extended release capsule TAKE ONE CAPSULE BY MOUTH EVERY DAY IN THE MORNING 20  Yes ANDRIY Mcintosh NP   DULoxetine (CYMBALTA) 30 MG extended release capsule Take 1 capsule by mouth daily With the evening meal 20  Yes ANDRIY Mcintosh NP   doxepin (SINEQUAN) 10 MG capsule Take 1 capsule by mouth nightly 20  Yes ANDRIY Mcintosh NP   busPIRone (BUSPAR) 15 MG tablet TAKE ONE TABLET BY MOUTH THREE TIMES A DAY 20  Yes ANDRIY Mcintosh NP   HYDROcodone-acetaminophen (NORCO) 5-325 MG per tablet Take 1 tablet by mouth 2 times daily.     Historical Provider, MD   pantoprazole (PROTONIX) 40 MG tablet Take 40 mg by mouth daily    Historical Provider, MD   ondansetron (ZOFRAN) 4 MG tablet Take 1 tablet by mouth every 8 hours as needed for Nausea or Vomiting 19   Ramonita Loaiza MD   calcium-vitamin D (OSCAL) 250-125 MG-UNIT per tablet Take 1 tablet by mouth daily    Historical Provider, MD   DOCUSATE SODIUM PO Take 100 mg by mouth daily    Historical Provider, MD   vitamin D (ERGOCALCIFEROL) 92430 units CAPS capsule Take 5,000 Units by mouth once a week 18   Historical Provider, MD   ferrous sulfate 325 (65 Fe) MG tablet Take 325 mg by mouth daily    Historical Provider, MD     Social History     Socioeconomic History    Marital status:      Spouse name: None    Number of children: None    Years of education: None    Highest education level: None   Occupational History    None   Social Needs    Financial resource strain: None    Food insecurity     Worry: None     Inability: None    Transportation needs     Medical: None     Non-medical: None   Tobacco Use    Smoking status: Former Smoker     Quit date:      Years since quittin.9    Smokeless tobacco: Never Used   Substance and Sexual Activity    Alcohol use: Never     Frequency: Never    Drug use: Never    Sexual activity: None Lifestyle    Physical activity     Days per week: None     Minutes per session: None    Stress: None   Relationships    Social connections     Talks on phone: None     Gets together: None     Attends Sikh service: None     Active member of club or organization: None     Attends meetings of clubs or organizations: None     Relationship status: None    Intimate partner violence     Fear of current or ex partner: None     Emotionally abused: None     Physically abused: None     Forced sexual activity: None   Other Topics Concern    None   Social History Narrative    SLEEP STUDY: Yes, home study, January, 2020, uses a BPAP       MSE:  Patient is  A & O x 4. Appearance:  HALIMA  Cognition:  Recent memory intact , remote memory intact , good fund of knowledge, average  intelligence level. Speech:  normal  Language: Naming: intact;  Word Finding: intact  Conversation no evidence of delusions  Behavior:  Cooperative  Mood: \"good\"  Affect: HALIMA  Thought Content: negative delusions, negative hallucinations, negative obsessions,  negativehomicidal and negative suicidal   Thought Process: linear, goal directed and coherent  Associations: logical connections  Attention Span and concentration: Normal   Judgement Insight:  normal and appropriate  Gait and Station: HALIMA   Sleep: 5hrs, difficulty falling asleep, reports that she doesn't fall asleep until around midnight and is up again around 5am.  (is not using her Bipap)  Appetite: ok      No results found for: NA, K, CL, CO2, BUN, CREATININE, GLUCOSE, CALCIUM, PROT, LABALBU, BILITOT, ALKPHOS, AST, ALT, LABGLOM, GFRAA, AGRATIO, GLOB  No results found for: NA, K, CL, CO2, BUN, CREATININE, GLUCOSE, CALCIUM   No results found for: CHOL  No results found for: TRIG  No results found for: HDL  No results found for: LDLCHOLESTEROL, LDLCALC  No results found for: LABVLDL, VLDL  No results found for: CHOLHDLRATIO  No results found for: LABA1C  No results found for: EAG  No results found for: TSHFT4, TSH  No results found for: VITD25    Assessments Administered:    PHQ9: 9, mild  GAD7: 3, normal    Assessment:   1. Mild episode of recurrent major depressive disorder ([address]    2. Generalized anxiety disorder with panic attacks    3. Insomnia due to other mental disorder        Plan:  Continue:  Cymbalta, 60mg, daily in the morning  Cymbalta, 30mg, daily with the evening meal  Doxepin, 10mg, nightly as needed for sleep  Buspirone, 15mg, three times daily   Melatonin, 3mg, nightly    Try an moe called Inveni or find something on YouTube which will stimulate the delta sleep waves. It might be helpful for promoting sleep. Finding an alternative equipment which would not bother you so much with your BiPap machine would also be optimal.    Follow up: Return in about 6 months (around 6/22/2021). 1. The risks, benefits, side effects, indications, contraindications, and adverse effects of the medications have been discussed. Yes.  2. The pt has verbalized understanding and has capacity to give informed consent. 3. The Ankit Katalina report has been reviewed according to [facility] regulations. 4. Supportive therapy offered. 5. Follow up: Return in about 6 months (around 6/22/2021). 6. The patient has been advised to call with any problems. 7. Controlled substance Treatment Plan: none.   8. The above listed medications have been continued, modifications in meds and other orders/labs as follows:      Orders Placed This Encounter   Medications    DULoxetine (CYMBALTA) 60 MG extended release capsule     Sig: TAKE ONE CAPSULE BY MOUTH EVERY DAY IN THE MORNING     Dispense:  90 capsule     Refill:  1    DULoxetine (CYMBALTA) 30 MG extended release capsule     Sig: Take 1 capsule by mouth daily With the evening meal     Dispense:  90 capsule     Refill:  1    doxepin (SINEQUAN) 10 MG capsule     Sig: Take 1 capsule by mouth nightly     Dispense:  90 capsule     Refill:  1    busPIRone (BUSPAR) 15 MG tablet     Sig: TAKE ONE TABLET BY MOUTH THREE TIMES A DAY     Dispense:  270 tablet     Refill:  1      No orders of the defined types were placed in this encounter. 9. Additional comments: Her mood is stable AEB by her PHQ9 and GAD7 scores. She has trouble sleeping, most likely due to having sleep apnea and not using a BiPap. She was encouraged to try a sound which stimulates the delta sleep waves, an moe called Alexys Teixeira or to find something on YouTube which would be similar. She verbalized understanding. Refilled her meds. Will make no changes today and will follow up in about 6 months. 10.Over 50% of the total visit time of 15 minutes was spent on counseling and/or coordination of care of:                        1. Mild episode of recurrent major depressive disorder ([facility] [address]    2. Generalized anxiety disorder with panic attacks    3.  Insomnia due to other mental disorder                      Psychotherapy Topics: mood/medication effectiveness       Honorio Holliday PMHNP-BC

## 2021-01-01 NOTE — PATIENT INSTRUCTIONS
Cathi Krishnan has done well this month with healthy changes. Patient has lost 3 pounds. Today we discussed healthy changes in lifestyle, diet, and exercise.   Handout provided on mindful eating.  Start  eating and drinking by 30 minutes.  Unjury shake sample and clear protein drink provided.  Intensive behavioral therapy for obesity was done today.   Goals for this month are: 3 meals per day with protein at each; eat protein first, use smaller plate; exercise as advised.  Follow up in one month for possible surgery submission.     
Frandy Aleman  (RN)  2021 18:07:38

## 2021-06-10 DIAGNOSIS — F33.0 MILD EPISODE OF RECURRENT MAJOR DEPRESSIVE DISORDER (HCC): ICD-10-CM

## 2021-06-10 RX ORDER — DULOXETIN HYDROCHLORIDE 60 MG/1
CAPSULE, DELAYED RELEASE ORAL
Qty: 90 CAPSULE | Refills: 0 | Status: SHIPPED | OUTPATIENT
Start: 2021-06-10 | End: 2021-09-27 | Stop reason: SDUPTHER

## 2021-06-10 RX ORDER — DULOXETIN HYDROCHLORIDE 30 MG/1
30 CAPSULE, DELAYED RELEASE ORAL DAILY
Qty: 90 CAPSULE | Refills: 0 | Status: SHIPPED | OUTPATIENT
Start: 2021-06-10 | End: 2021-09-27 | Stop reason: SDUPTHER

## 2021-06-10 NOTE — TELEPHONE ENCOUNTER
Louis Mackenzie called to request refill on her medication      Last office visit: Visit date not found  Next office visit: 6/24/2021    Requested Prescriptions     Pending Prescriptions Disp Refills    DULoxetine (CYMBALTA) 60 MG extended release capsule [Pharmacy Med Name: DULOXETINE HCL 60MG CPEP] 90 capsule 1     Sig: TAKE ONE CAPSULE BY MOUTH EVERY DAY IN THE MORNING        Virtual Visit  12/22/2020  [address] [facility]   ANDRIY Collins NP  Nurse Practitioner [address] [facility] [place] [facility]\""  Mild episode of recurrent major depressive disorder ([facility] [address] +2 more  Dx  Follow-up  Anxiety  Depression  Insomnia  Reason for Visit   Progress Notes  ANDRIY Collins NP (Advanced Practice Nurse) [address] on 12/29/2020 10:27 AM by Digna Narvaez MA: [id number] on 12/29/2020 10:27 AM by Digna Narvaez MA: [email or web address]   Progress Notes  ANDRIY Collins NP (Advanced Practice Nurse) [address] All  []Expand All by Default  Louis Mackenzie is a 36 y.o. female evaluated via telephone on 12/22/2020.       Consent:  She and/or health care decision maker is aware that that she may receive a bill for this telephone service, depending on her insurance coverage, and has provided verbal consent to proceed: Yes        Documentation:  I communicated with the patient and/or health care decision maker about anxiety, depression.    Details of this discussion including any medical advice provided: see below        I affirm this is a Patient Initiated Episode with a Patient who has not had a related appointment within my department in the past 7 days or scheduled within the next 24 hours.     Patient identification was verified at the start of the visit: Yes     Total Time: minutes: 11-20 minutes     Note: not billable if this call serves to triage the patient into an appointment for the relevant concern        Shira Parsons      12/22/2020 3:58 PM                           Progress Note     IN:  1535  OUT: 1550         Louis Avril 1980                           Chief Complaint   Patient presents with    Follow-up    Anxiety    Depression    Insomnia         Subjective:  Patient is a 36 y.o. female diagnosed with MDD and presents today for follow-up. Last seen in clinic on 6/1/20 and prior records were reviewed.     Today patient states, \"I've been doing good. \" She reports no complaints or concerns. She reports one panic attack since the last visit (which was yesterday). She had to go talk to her boss. She reports some problems with sleep. She reports that her mind races at night.      Patient reports side effects as follows: none. No evidence of EPS, no cogwheeling or abnormal motor movements.     Absent  suicidal ideation.   Reports compliance with medications as good .      Current Substance Use:  See history     BP: There were no vitals taken for this visit.        Review of Systems - 14 point review:  Negative except being treated for: anxiety, depression, insomnia, chronic back and knee pain, acid reflux, sleep apnea (BPAP, not using because she couldn't use the mask)        Constitutional: (fevers, chills, night sweats, wt loss/gain, change in appetite, fatigue, somnolence)     HEENT: (ear pain or discharge, hearing loss, ear ringing, sinus pressure, nosebleed, nasal discharge, sore throat, oral sores, tooth pain, bleeding gums, hoarse voice, neck pain)      Cardiovascular: (HTN, chest pain, elevated cholesterol/lipids, palpitations, leg swelling, leg pain with walking)     Respiratory: (cough, wheezing, snoring, SOB with activity (dyspnea), SOB while lying flat (orthopnea), awakening with severe SOB (paroxysmal nocturnal dyspnea))     Gastrointestinal: (NVD, constipation, abdominal pain, bright red stools, black tarry stools, stool incontinence)     Genitourinary:  (pelvic pain, burning HALIMA  Cognition:  Recent memory intact , remote memory intact , good fund of knowledge, average  intelligence level. Speech:  normal  Language: Naming: intact; Word Finding: intact  Conversation no evidence of delusions  Behavior:  Cooperative  Mood: \"good\"  Affect: HALIMA  Thought Content: negative delusions, negative hallucinations, negative obsessions,  negativehomicidal and negative suicidal   Thought Process: linear, goal directed and coherent  Associations: logical connections  Attention Span and concentration: Normal   Judgement Insight:  normal and appropriate  Gait and Station: HALIMA   Sleep: 5hrs, difficulty falling asleep, reports that she doesn't fall asleep until around midnight and is up again around 5am.  (is not using her Bipap)  Appetite: ok        No results found for: NA, K, CL, CO2, BUN, CREATININE, GLUCOSE, CALCIUM, PROT, LABALBU, BILITOT, ALKPHOS, AST, ALT, LABGLOM, GFRAA, AGRATIO, GLOB  No results found for: NA, K, CL, CO2, BUN, CREATININE, GLUCOSE, CALCIUM   No results found for: CHOL  No results found for: TRIG  No results found for: HDL  No results found for: LDLCHOLESTEROL, LDLCALC  No results found for: LABVLDL, VLDL  No results found for: CHOLHDLRATIO  No results found for: LABA1C  No results found for: EAG  No results found for: TSHFT4, TSH  No results found for: VITD25     Assessments Administered:     PHQ9: 9, mild  GAD7: 3, normal     Assessment:    1. Mild episode of recurrent major depressive disorder ([facility] [address]    2. Generalized anxiety disorder with panic attacks    3. Insomnia due to other mental disorder          Plan:  Continue:  Cymbalta, 60mg, daily in the morning  Cymbalta, 30mg, daily with the evening meal  Doxepin, 10mg, nightly as needed for sleep  Buspirone, 15mg, three times daily   Melatonin, 3mg, nightly     Try an moe called Hotlist or find something on YouTube which will stimulate the delta sleep waves. It might be helpful for promoting sleep.  Finding an alternative equipment

## 2021-06-24 ENCOUNTER — TELEPHONE (OUTPATIENT)
Dept: PSYCHIATRY | Age: 41
End: 2021-06-24

## 2021-06-24 NOTE — TELEPHONE ENCOUNTER
Called pt for appointment reminder.   -left voicemail, requesting a return call    Electronically signed by Yuliana Fabian MA on 6/24/2021 at 2:15 PM

## 2021-06-25 ENCOUNTER — OFFICE VISIT (OUTPATIENT)
Dept: PSYCHIATRY | Age: 41
End: 2021-06-25
Payer: COMMERCIAL

## 2021-06-25 VITALS
HEART RATE: 96 BPM | WEIGHT: 250.1 LBS | BODY MASS INDEX: 45.74 KG/M2 | DIASTOLIC BLOOD PRESSURE: 84 MMHG | SYSTOLIC BLOOD PRESSURE: 125 MMHG | OXYGEN SATURATION: 97 % | TEMPERATURE: 97.3 F

## 2021-06-25 DIAGNOSIS — F51.05 INSOMNIA DUE TO OTHER MENTAL DISORDER: ICD-10-CM

## 2021-06-25 DIAGNOSIS — F99 INSOMNIA DUE TO OTHER MENTAL DISORDER: ICD-10-CM

## 2021-06-25 DIAGNOSIS — F41.1 GAD (GENERALIZED ANXIETY DISORDER): ICD-10-CM

## 2021-06-25 DIAGNOSIS — F33.0 MILD EPISODE OF RECURRENT MAJOR DEPRESSIVE DISORDER (HCC): Primary | ICD-10-CM

## 2021-06-25 PROCEDURE — 99212 OFFICE O/P EST SF 10 MIN: CPT | Performed by: NURSE PRACTITIONER

## 2021-06-25 ASSESSMENT — PATIENT HEALTH QUESTIONNAIRE - PHQ9
SUM OF ALL RESPONSES TO PHQ QUESTIONS 1-9: 8
SUM OF ALL RESPONSES TO PHQ QUESTIONS 1-9: 8
9. THOUGHTS THAT YOU WOULD BE BETTER OFF DEAD, OR OF HURTING YOURSELF: 0
3. TROUBLE FALLING OR STAYING ASLEEP: 2
1. LITTLE INTEREST OR PLEASURE IN DOING THINGS: 0
4. FEELING TIRED OR HAVING LITTLE ENERGY: 2
SUM OF ALL RESPONSES TO PHQ QUESTIONS 1-9: 8
7. TROUBLE CONCENTRATING ON THINGS, SUCH AS READING THE NEWSPAPER OR WATCHING TELEVISION: 1
6. FEELING BAD ABOUT YOURSELF - OR THAT YOU ARE A FAILURE OR HAVE LET YOURSELF OR YOUR FAMILY DOWN: 0
5. POOR APPETITE OR OVEREATING: 3
SUM OF ALL RESPONSES TO PHQ9 QUESTIONS 1 & 2: 0
2. FEELING DOWN, DEPRESSED OR HOPELESS: 0
8. MOVING OR SPEAKING SO SLOWLY THAT OTHER PEOPLE COULD HAVE NOTICED. OR THE OPPOSITE, BEING SO FIGETY OR RESTLESS THAT YOU HAVE BEEN MOVING AROUND A LOT MORE THAN USUAL: 0

## 2021-06-25 ASSESSMENT — ANXIETY QUESTIONNAIRES
2. NOT BEING ABLE TO STOP OR CONTROL WORRYING: 1-SEVERAL DAYS
4. TROUBLE RELAXING: 0-NOT AT ALL
5. BEING SO RESTLESS THAT IT IS HARD TO SIT STILL: 2-OVER HALF THE DAYS
3. WORRYING TOO MUCH ABOUT DIFFERENT THINGS: 1-SEVERAL DAYS
7. FEELING AFRAID AS IF SOMETHING AWFUL MIGHT HAPPEN: 0-NOT AT ALL
6. BECOMING EASILY ANNOYED OR IRRITABLE: 1-SEVERAL DAYS
1. FEELING NERVOUS, ANXIOUS, OR ON EDGE: 1-SEVERAL DAYS
GAD7 TOTAL SCORE: 6

## 2021-06-25 NOTE — LETTER
[address] [facility]  [address] [place] [address] 60  [address] [address]  Phone: [phone]  Fax: [phone]    ANDRIY Parks NP        June 25, 2021     Patient: Layvonne Frankel   YOB: 1980   Date of Visit: 6/25/2021       To Whom it May Concern:    Jeanettekeke Kwame was seen in my clinic on 6/25/2021. If you have any questions or concerns, please don't hesitate to call.     Sincerely,         ANDRIY Parks NP

## 2021-06-25 NOTE — PATIENT INSTRUCTIONS
Plan:  Continue:  Cymbalta, 60mg, daily in the morning  Cymbalta, 30mg, daily with the evening meal  Doxepin, 10mg, nightly as needed for sleep (not taking regularly)  Buspirone, 15mg, three times daily (is currently only taking in the morning)    Stop: Melatonin, 3mg, nightly    Try an moe called [employer] or find something on YouTube which will stimulate the delta sleep waves. It might be helpful for promoting sleep. Finding an alternative equipment which would not bother you so much with your BiPap machine would also be optimal.    Follow up: Return in about 3 months (around 9/25/2021).

## 2021-06-25 NOTE — PROGRESS NOTES
6/25/2021 9:02 AM   Progress Note    IN:  0840  OUT: Paolo 1980      Chief Complaint   Patient presents with    Follow-up    Anxiety    Depression    Insomnia       Subjective:  Patient is a 36 y.o. female diagnosed with MDD and presents today for follow-up. Last seen in clinic on 12/22/20 and prior records were reviewed. Today patient states, \"I'm doing good. \" She reports no complaints or concerns. She reports no panic attacks in a \"while. \" She says that her sleep is ok. She says that she still has some racing thoughts at night but that she has gotten used to them. Patient reports side effects as follows: none. No evidence of EPS, no cogwheeling or abnormal motor movements. Absent  suicidal ideation. Reports compliance with medications as good .      Current Substance Use:  See history    BP: /84   Pulse 96   Temp 97.3 °F (36.3 °C)   Wt 250 lb 1.6 oz (113.4 kg)   SpO2 97%   BMI 45.74 kg/m²       Review of Systems - 14 point review:  Negative except being treated for: anxiety, depression, insomnia, chronic back and knee pain, acid reflux, sleep apnea (BPAP, not using because she couldn't use the mask)      Constitutional: (fevers, chills, night sweats, wt loss/gain, change in appetite, fatigue, somnolence)    HEENT: (ear pain or discharge, hearing loss, ear ringing, sinus pressure, nosebleed, nasal discharge, sore throat, oral sores, tooth pain, bleeding gums, hoarse voice, neck pain)      Cardiovascular: (HTN, chest pain, elevated cholesterol/lipids, palpitations, leg swelling, leg pain with walking)    Respiratory: (cough, wheezing, snoring, SOB with activity (dyspnea), SOB while lying flat (orthopnea), awakening with severe SOB (paroxysmal nocturnal dyspnea))    Gastrointestinal: (NVD, constipation, abdominal pain, bright red stools, black tarry stools, stool incontinence)     Genitourinary:  (pelvic pain, burning or frequency of urination, urinary urgency, Meetings:     Marital Status:    Intimate Partner Violence:     Fear of Current or Ex-Partner:     Emotionally Abused:     Physically Abused:     Sexually Abused:        MSE:  Patient is  A & O x 4. Appearance:  HALIMA  Cognition:  Recent memory intact , remote memory intact , good fund of knowledge, average  intelligence level. Speech:  normal  Language: Naming: intact; Word Finding: intact  Conversation no evidence of delusions  Behavior:  Cooperative  Mood:  \"good\"  Affect: HALIMA  Thought Content: negative delusions, negative hallucinations, negative obsessions,  negativehomicidal and negative suicidal   Thought Process: linear, goal directed and coherent  Associations: logical connections  Attention Span and concentration: Normal   Judgement Insight:  normal and appropriate  Gait and Station: HALIMA   Sleep:  avg 5-6 hrs, difficulty falling asleep, reports that she doesn't fall asleep until around midnight and is up again around 5am.  (is not using her Bipap)  Appetite: ok      No results found for: NA, K, CL, CO2, BUN, CREATININE, GLUCOSE, CALCIUM, PROT, LABALBU, BILITOT, ALKPHOS, AST, ALT, LABGLOM, GFRAA, AGRATIO, GLOB  No results found for: NA, K, CL, CO2, BUN, CREATININE, GLUCOSE, CALCIUM   No results found for: CHOL  No results found for: TRIG  No results found for: HDL  No results found for: LDLCHOLESTEROL, LDLCALC  No results found for: LABVLDL, VLDL  No results found for: CHOLHDLRATIO  No results found for: LABA1C  No results found for: EAG  No results found for: TSHFT4, TSH  No results found for: VITD25    Assessments Administered:    PHQ9: 8, mild  GAD7: 6, mild    Assessment:   1. Mild episode of recurrent major depressive disorder ([facility] [address]    2. FIDELIA (generalized anxiety disorder)    3.  Insomnia due to other mental disorder        Plan:  Continue:  Cymbalta, 60mg, daily in the morning  Cymbalta, 30mg, daily with the evening meal  Doxepin, 10mg, nightly as needed for sleep (not taking regularly)  Buspirone, 15mg, three times daily (is currently only taking in the morning)    Stop: Melatonin, 3mg, nightly    Try an moe called Alean Opal or find something on YouTube which will stimulate the delta sleep waves. It might be helpful for promoting sleep. Finding an alternative equipment which would not bother you so much with your BiPap machine would also be optimal.    Follow up: Return in about 3 months (around 9/25/2021). 1. The risks, benefits, side effects, indications, contraindications, and adverse effects of the medications have been discussed. Yes.  2. The pt has verbalized understanding and has capacity to give informed consent. 3. The Mariam Zheng report has been reviewed according to [facility] regulations. 4. Supportive therapy offered. 5. Follow up: Return in about 3 months (around 9/25/2021). 6. The patient has been advised to call with any problems. 7. Controlled substance Treatment Plan: none. 8. The above listed medications have been continued, modifications in meds and other orders/labs as follows: No orders of the defined types were placed in this encounter. No orders of the defined types were placed in this encounter. 9. Additional comments: Her mood continues to be stable on her current medications. She reports that she is only taking Buspar in the morning and that she takes Doxepin infrequently. She reports that she tried going without Buspar but finds that she gets a nervous, nausous feeling when she doesn't take it. Reviewed the [employer] moe with her as something she can try at night to help with sleep. She continues to say that she has some racing/anxious thoughts at night. Two of the high scoring items on her PHQ9 are probably related to non-use of her BiPAP.  She was encouraged to start using it again, maybe with a nose piece instead of a mask, since lack of O2 is probably contributing to her lack of energy during the day and may be contributing to difficulty concentrating during the day. She verbalized understanding. Will make no changes today and will follow up in about 3 months. Patient was informed that this provider is moving back to [place] in October. ... 10.Over 50% of the total visit time of 15 minutes was spent on counseling and/or coordination of care of:                        1. Mild episode of recurrent major depressive disorder ([facility] [address]    2. FIDELIA (generalized anxiety disorder)    3.  Insomnia due to other mental disorder                      Psychotherapy Topics: mood/medication effectiveness       ANDRIY Patel Arm - JERICHO PMHNP-BC

## 2021-09-27 ENCOUNTER — OFFICE VISIT (OUTPATIENT)
Dept: PSYCHIATRY | Age: 41
End: 2021-09-27
Payer: COMMERCIAL

## 2021-09-27 VITALS
DIASTOLIC BLOOD PRESSURE: 83 MMHG | WEIGHT: 248.7 LBS | SYSTOLIC BLOOD PRESSURE: 117 MMHG | HEIGHT: 62 IN | TEMPERATURE: 97.5 F | OXYGEN SATURATION: 98 % | BODY MASS INDEX: 45.77 KG/M2 | HEART RATE: 81 BPM

## 2021-09-27 DIAGNOSIS — F33.0 MILD EPISODE OF RECURRENT MAJOR DEPRESSIVE DISORDER (HCC): Primary | ICD-10-CM

## 2021-09-27 DIAGNOSIS — G47.01 INSOMNIA DUE TO MEDICAL CONDITION: ICD-10-CM

## 2021-09-27 DIAGNOSIS — F41.1 GENERALIZED ANXIETY DISORDER WITH PANIC ATTACKS: ICD-10-CM

## 2021-09-27 DIAGNOSIS — F41.0 GENERALIZED ANXIETY DISORDER WITH PANIC ATTACKS: ICD-10-CM

## 2021-09-27 PROCEDURE — 99212 OFFICE O/P EST SF 10 MIN: CPT | Performed by: NURSE PRACTITIONER

## 2021-09-27 RX ORDER — DULOXETIN HYDROCHLORIDE 30 MG/1
30 CAPSULE, DELAYED RELEASE ORAL DAILY
Qty: 90 CAPSULE | Refills: 0 | Status: SHIPPED | OUTPATIENT
Start: 2021-09-27 | End: 2022-01-14 | Stop reason: SDUPTHER

## 2021-09-27 RX ORDER — DOXEPIN HYDROCHLORIDE 10 MG/1
10 CAPSULE ORAL NIGHTLY
Qty: 90 CAPSULE | Refills: 0 | Status: SHIPPED | OUTPATIENT
Start: 2021-09-27 | End: 2022-01-27 | Stop reason: ALTCHOICE

## 2021-09-27 RX ORDER — BUSPIRONE HYDROCHLORIDE 15 MG/1
TABLET ORAL
Qty: 270 TABLET | Refills: 0 | Status: SHIPPED | OUTPATIENT
Start: 2021-09-27 | End: 2022-01-27

## 2021-09-27 RX ORDER — DULOXETIN HYDROCHLORIDE 60 MG/1
CAPSULE, DELAYED RELEASE ORAL
Qty: 90 CAPSULE | Refills: 0 | Status: SHIPPED | OUTPATIENT
Start: 2021-09-27 | End: 2022-01-14 | Stop reason: SDUPTHER

## 2021-09-27 ASSESSMENT — ANXIETY QUESTIONNAIRES
1. FEELING NERVOUS, ANXIOUS, OR ON EDGE: 2-OVER HALF THE DAYS
7. FEELING AFRAID AS IF SOMETHING AWFUL MIGHT HAPPEN: 0-NOT AT ALL
5. BEING SO RESTLESS THAT IT IS HARD TO SIT STILL: 3-NEARLY EVERY DAY
3. WORRYING TOO MUCH ABOUT DIFFERENT THINGS: 2-OVER HALF THE DAYS
GAD7 TOTAL SCORE: 12
6. BECOMING EASILY ANNOYED OR IRRITABLE: 1-SEVERAL DAYS
4. TROUBLE RELAXING: 1-SEVERAL DAYS
2. NOT BEING ABLE TO STOP OR CONTROL WORRYING: 3-NEARLY EVERY DAY

## 2021-09-27 ASSESSMENT — PATIENT HEALTH QUESTIONNAIRE - PHQ9
1. LITTLE INTEREST OR PLEASURE IN DOING THINGS: 1
6. FEELING BAD ABOUT YOURSELF - OR THAT YOU ARE A FAILURE OR HAVE LET YOURSELF OR YOUR FAMILY DOWN: 0
2. FEELING DOWN, DEPRESSED OR HOPELESS: 0
9. THOUGHTS THAT YOU WOULD BE BETTER OFF DEAD, OR OF HURTING YOURSELF: 0
5. POOR APPETITE OR OVEREATING: 3
7. TROUBLE CONCENTRATING ON THINGS, SUCH AS READING THE NEWSPAPER OR WATCHING TELEVISION: 0
SUM OF ALL RESPONSES TO PHQ QUESTIONS 1-9: 11
SUM OF ALL RESPONSES TO PHQ QUESTIONS 1-9: 11
3. TROUBLE FALLING OR STAYING ASLEEP: 2
SUM OF ALL RESPONSES TO PHQ QUESTIONS 1-9: 11
SUM OF ALL RESPONSES TO PHQ9 QUESTIONS 1 & 2: 1
8. MOVING OR SPEAKING SO SLOWLY THAT OTHER PEOPLE COULD HAVE NOTICED. OR THE OPPOSITE, BEING SO FIGETY OR RESTLESS THAT YOU HAVE BEEN MOVING AROUND A LOT MORE THAN USUAL: 2
4. FEELING TIRED OR HAVING LITTLE ENERGY: 3

## 2021-09-27 NOTE — PROGRESS NOTES
9/27/2021 9:05 AM   Progress Note    IN:  0845  OUT: 0900       Nilesh Villarreal 1980      Chief Complaint   Patient presents with    Follow-up    Anxiety    Depression    Panic Attack       Subjective:  Patient is a 39 y.o. female diagnosed with MDD and presents today for follow-up. Last seen in clinic on 6/25/21 and prior records were reviewed. Today patient states, \"I'm doing the same. \" She reports that she is sleeping better and that yesterday she slept through the day. She reports that her mood is good. She reports a panic attack last week. It happened for no reason when she was in a store. She reports that they happen about once a week. Patient reports side effects as follows: none. No evidence of EPS, no cogwheeling or abnormal motor movements. Absent  suicidal ideation. Reports compliance with medications as good .      Current Substance Use:  See history    BP: /83   Pulse 81   Temp 97.5 °F (36.4 °C)   Ht 5' 2\" (1.575 m)   Wt 248 lb 11.2 oz (112.8 kg)   SpO2 98%   BMI 45.49 kg/m²       Review of Systems - 14 point review:  Negative except being treated for: anxiety, depression, insomnia, chronic back and knee pain, wearing a brace on the L knee, acid reflux, sleep apnea (BPAP, not using because she couldn't use the mask)      Constitutional: (fevers, chills, night sweats, wt loss/gain, change in appetite, fatigue, somnolence)    HEENT: (ear pain or discharge, hearing loss, ear ringing, sinus pressure, nosebleed, nasal discharge, sore throat, oral sores, tooth pain, bleeding gums, hoarse voice, neck pain)      Cardiovascular: (HTN, chest pain, elevated cholesterol/lipids, palpitations, leg swelling, leg pain with walking)    Respiratory: (cough, wheezing, snoring, SOB with activity (dyspnea), SOB while lying flat (orthopnea), awakening with severe SOB (paroxysmal nocturnal dyspnea))    Gastrointestinal: (NVD, constipation, abdominal pain, bright red stools, black tarry stools, stool incontinence)     Genitourinary:  (pelvic pain, burning or frequency of urination, urinary urgency, blood in urine incomplete bladder emptying, urinary incontinence, STD; MEN: testicular pain or swelling, erectile dysfunction; WOMEN: LMP, heavy menstrual bleeding (menorrhagia), irregular periods, postmenopausal bleeding, menstrual pain (dymenorrhea, vaginal discharge)    Musculoskeletal: (bone pain/fracture, joint pain or swelling, musle pain)    Integumentary: (rashes, acne, non-healing sores, itching, breast lumps, breast pain, nipple discharge, hair loss)    Neurologic: (HA, muscle weakness, paresthesias (numbness, coldness, crawling or prickling), memory loss, seizure, dizziness)    Psychiatric:  (anxiety, sadness, irritability/anger, insomnia, suicidality)    Endocrine: (heat or cold intolerance, excessive thirst (polydipsia), excessive hunger (polyphagia))    Immune/Allergic: (hives, seasonal or environmental allergies, HIV exposure)    Hematologic/Lymphatic: (lymph node enlargement, easy bleeding or bruising)    History obtained via chart review and patient    PCP is Fernanda Patrick       Current Meds:    Prior to Admission medications    Medication Sig Start Date End Date Taking? Authorizing Provider   DULoxetine (CYMBALTA) 60 MG extended release capsule TAKE ONE CAPSULE BY MOUTH EVERY DAY IN THE MORNING 9/27/21  Yes ANDRIY Marquez NP   DULoxetine (CYMBALTA) 30 MG extended release capsule Take 1 capsule by mouth daily With the evening meal 9/27/21  Yes ANDRIY Marquez NP   doxepin (SINEQUAN) 10 MG capsule Take 1 capsule by mouth nightly 9/27/21  Yes ANDRIY Marquez NP   busPIRone (BUSPAR) 15 MG tablet TAKE ONE TABLET BY MOUTH THREE TIMES A DAY 9/27/21  Yes ANDRIY Marquez NP   HYDROcodone-acetaminophen (NORCO) 5-325 MG per tablet Take 1 tablet by mouth 2 times daily.     Historical Provider, MD   pantoprazole (PROTONIX) 40 MG tablet Take 40 mg by mouth daily    Historical Provider, MD   ondansetron (ZOFRAN) 4 MG tablet Take 1 tablet by mouth every 8 hours as needed for Nausea or Vomiting  Patient not taking: Reported on 2021   Bobby Alvares MD   calcium-vitamin D (OSCAL) 250-125 MG-UNIT per tablet Take 1 tablet by mouth daily  Patient not taking: Reported on 2021    Historical Provider, MD   DOCUSATE SODIUM PO Take 100 mg by mouth daily  Patient not taking: Reported on 2021    Historical Provider, MD   vitamin D (ERGOCALCIFEROL) 20154 units CAPS capsule Take 5,000 Units by mouth once a week 18   Historical Provider, MD   ferrous sulfate 325 (65 Fe) MG tablet Take 325 mg by mouth daily    Historical Provider, MD     Social History     Socioeconomic History    Marital status:      Spouse name: None    Number of children: None    Years of education: None    Highest education level: None   Occupational History    None   Tobacco Use    Smoking status: Former Smoker     Quit date:      Years since quittin.7    Smokeless tobacco: Never Used   Vaping Use    Vaping Use: Never used   Substance and Sexual Activity    Alcohol use: Never    Drug use: Never    Sexual activity: None   Other Topics Concern    None   Social History Narrative    SLEEP STUDY: Yes, home study, , uses a BPAP     Social Determinants of Health     Financial Resource Strain:     Difficulty of Paying Living Expenses:    Food Insecurity:     Worried About Running Out of Food in the Last Year:     [address] in the Last Year:    Transportation Needs:     Lack of Transportation (Medical):      Lack of Transportation (Non-Medical):    Physical Activity:     Days of Exercise per Week:     Minutes of Exercise per Session:    Stress:     Feeling of Stress :    Social Connections:     Frequency of Communication with Friends and Family:     Frequency of Social Gatherings with Friends and Family:     Attends Spiritism Services:     Active Member of Clubs or Organizations:     Attends Club or Organization Meetings:     Marital Status:    Intimate Partner Violence:     Fear of Current or Ex-Partner:     Emotionally Abused:     Physically Abused:     Sexually Abused:        MSE:  Patient is  A & O x 4. Appearance:  HALIMA  Cognition:  Recent memory intact , remote memory intact , good fund of knowledge, average  intelligence level. Speech:  normal  Language: Naming: intact; Word Finding: intact  Conversation no evidence of delusions  Behavior:  Cooperative  Mood: \"good\"  Affect: HALIMA  Thought Content: negative delusions, negative hallucinations, negative obsessions,  negativehomicidal and negative suicidal   Thought Process: linear, goal directed and coherent  Associations: logical connections  Attention Span and concentration: Normal   Judgement Insight:  normal and appropriate  Gait and Station: HALIMA   Sleep:  avg 5-6 hrs, waking up around 5am (is not using her Bipap)  Appetite: ok      No results found for: NA, K, CL, CO2, BUN, CREATININE, GLUCOSE, CALCIUM, PROT, LABALBU, BILITOT, ALKPHOS, AST, ALT, LABGLOM, GFRAA, AGRATIO, GLOB  No results found for: NA, K, CL, CO2, BUN, CREATININE, GLUCOSE, CALCIUM   No results found for: CHOL  No results found for: TRIG  No results found for: HDL  No results found for: LDLCHOLESTEROL, LDLCALC  No results found for: LABVLDL, VLDL  No results found for: CHOLHDLRATIO  No results found for: LABA1C  No results found for: EAG  No results found for: TSHFT4, TSH  No results found for: VITD25    Assessments Administered:    PHQ9: 11, moderate  GAD7: 12, moderate (panic attacks about once a week)    Assessment:   1. Mild episode of recurrent major depressive disorder ([facility] [address]    2. Generalized anxiety disorder with panic attacks    3.  Insomnia due to medical condition        Plan:  Continue:  Cymbalta, 60mg, daily in the morning  Cymbalta, 30mg, daily with the evening meal  Doxepin, 10mg, nightly as needed for sleep (not taking regularly)  Buspirone, 15mg, three times daily (is currently only taking in the morning)    (Follow up with restarting your BiPap)      Try an moe called Excell Larch or find something on YouTube which will stimulate the delta sleep waves. It might be helpful for promoting sleep. Finding an alternative equipment which would not bother you so much with your BiPap machine would also be optimal.    Follow up: Return in about 4 months (around 1/27/2022). 1. The risks, benefits, side effects, indications, contraindications, and adverse effects of the medications have been discussed. Yes.  2. The pt has verbalized understanding and has capacity to give informed consent. 3. The L[place] report has been reviewed according to [facility] regulations. 4. Supportive therapy offered. 5. Follow up: Return in about 4 months (around 1/27/2022). 6. The patient has been advised to call with any problems. 7. Controlled substance Treatment Plan: none. 8. The above listed medications have been continued, modifications in meds and other orders/labs as follows:      Orders Placed This Encounter   Medications    DULoxetine (CYMBALTA) 60 MG extended release capsule     Sig: TAKE ONE CAPSULE BY MOUTH EVERY DAY IN THE MORNING     Dispense:  90 capsule     Refill:  0    DULoxetine (CYMBALTA) 30 MG extended release capsule     Sig: Take 1 capsule by mouth daily With the evening meal     Dispense:  90 capsule     Refill:  0    doxepin (SINEQUAN) 10 MG capsule     Sig: Take 1 capsule by mouth nightly     Dispense:  90 capsule     Refill:  0    busPIRone (BUSPAR) 15 MG tablet     Sig: TAKE ONE TABLET BY MOUTH THREE TIMES A DAY     Dispense:  270 tablet     Refill:  0      No orders of the defined types were placed in this encounter. 9. Additional comments: Her PHQ9 and GAD7 scores are increased. She endorses feeling tired and sleeping a lot during the day. Her actual depression score on the PHQ9 was 0.  The higher scores are probably related to sleep apnea and not mood. She was encouraged to find a solution to using her BiPap again. She verbalized understanding. Will make no medication changes today. She will follow up with another provider in this office in about 4 months. ... 10.Over 50% of the total visit time of 15 minutes was spent on counseling and/or coordination of care of:                        1. Mild episode of recurrent major depressive disorder ([facility] [address]    2. Generalized anxiety disorder with panic attacks    3.  Insomnia due to medical condition                      Psychotherapy Topics: mood/medication effectiveness       ANDRIY Tabares - NP PMHNP-BC

## 2021-09-27 NOTE — PATIENT INSTRUCTIONS
Plan:  Continue:  Cymbalta, 60mg, daily in the morning  Cymbalta, 30mg, daily with the evening meal  Doxepin, 10mg, nightly as needed for sleep (not taking regularly)  Buspirone, 15mg, three times daily (is currently only taking in the morning)    (Follow up with restarting your BiPap)      Try an moe called Contactually or find something on YouTube which will stimulate the delta sleep waves. It might be helpful for promoting sleep. Finding an alternative equipment which would not bother you so much with your BiPap machine would also be optimal.    Follow up: Return in about 4 months (around 1/27/2022). I want you to know that I have enjoyed working with you and have enjoyed my time here in [place]. I have found the people in [place] very kind people, very patient and polite. I will miss you as I return to [place]. This was a personal decision as I want to spend my custodial years closer to my sons who live in [place].  I wish you the very best.

## 2021-09-27 NOTE — LETTER
[address] [facility]  [address] [place] [address]  [address] [address]  Phone: [phone]  Fax: [phone]    ANDRIY Ford NP        September 27, 2021     Patient: Olman Skinner   YOB: 1980   Date of Visit: 9/27/2021       To Whom it May Concern:    Haile Correa was seen in my clinic on 9/27/2021. If you have any questions or concerns, please don't hesitate to call.     Sincerely,         ANDRIY Ford NP

## 2021-12-21 ENCOUNTER — HOSPITAL ENCOUNTER (OUTPATIENT)
Dept: GENERAL RADIOLOGY | Facility: HOSPITAL | Age: 41
Discharge: HOME | End: 2021-12-21
Admitting: NURSE PRACTITIONER

## 2021-12-21 ENCOUNTER — TRANSCRIBE ORDERS (OUTPATIENT)
Dept: ADMINISTRATIVE | Facility: HOSPITAL | Age: 41
End: 2021-12-21

## 2021-12-21 DIAGNOSIS — M47.816 LUMBAR SPONDYLOSIS: Primary | ICD-10-CM

## 2021-12-21 DIAGNOSIS — M47.816 LUMBAR SPONDYLOSIS: ICD-10-CM

## 2021-12-21 PROCEDURE — 72120 X-RAY BEND ONLY L-S SPINE: CPT

## 2022-01-14 ENCOUNTER — TELEPHONE (OUTPATIENT)
Dept: PSYCHIATRY | Age: 42
End: 2022-01-14

## 2022-01-14 DIAGNOSIS — F33.0 MILD EPISODE OF RECURRENT MAJOR DEPRESSIVE DISORDER (HCC): ICD-10-CM

## 2022-01-14 RX ORDER — DULOXETIN HYDROCHLORIDE 30 MG/1
30 CAPSULE, DELAYED RELEASE ORAL DAILY
Qty: 90 CAPSULE | Refills: 0 | Status: SHIPPED | OUTPATIENT
Start: 2022-01-14 | End: 2022-01-27

## 2022-01-14 RX ORDER — DULOXETIN HYDROCHLORIDE 60 MG/1
CAPSULE, DELAYED RELEASE ORAL
Qty: 90 CAPSULE | Refills: 0 | Status: SHIPPED | OUTPATIENT
Start: 2022-01-14 | End: 2022-04-18

## 2022-01-14 NOTE — TELEPHONE ENCOUNTER
Pharmacy sent a request to refill pt medication. Last office visit : 9/27/2021 Oliviacrispin Hernán ASHRAF  Next office visit : 1/27/2022 Aminahjacquelin Navarro ANDRIY    Requested Prescriptions     Pending Prescriptions Disp Refills    DULoxetine (CYMBALTA) 30 MG extended release capsule 90 capsule 0     Sig: Take 1 capsule by mouth daily With the evening meal    DULoxetine (CYMBALTA) 60 MG extended release capsule 90 capsule 0     Sig: TAKE ONE CAPSULE BY MOUTH EVERY DAY IN THE MORNING            Maru Gutiérrez                        9/27/2021 9:05 AM                             Progress Note     IN:  0845  OUT: 0900         [facility] 1980                           Chief Complaint   Patient presents with    Follow-up    Anxiety    Depression    Panic Attack         Subjective:  Patient is a 39 y.o. female diagnosed with MDD and presents today for follow-up. Last seen in clinic on 6/25/21 and prior records were reviewed.     Today patient states, \"I'm doing the same. \" She reports that she is sleeping better and that yesterday she slept through the day. She reports that her mood is good. She reports a panic attack last week. It happened for no reason when she was in a store. She reports that they happen about once a week.      Patient reports side effects as follows: none. No evidence of EPS, no cogwheeling or abnormal motor movements.     Absent  suicidal ideation.   Reports compliance with medications as good .      Current Substance Use:  See history     BP: /83   Pulse 81   Temp 97.5 °F (36.4 °C)   Ht 5' 2\" (1.575 m)   Wt 248 lb 11.2 oz (112.8 kg)   SpO2 98%   BMI 45.49 kg/m²         Review of Systems - 14 point review:  Negative except being treated for: anxiety, depression, insomnia, chronic back and knee pain, wearing a brace on the L knee, acid reflux, sleep apnea (BPAP, not using because she couldn't use the mask)        Constitutional: (fevers, chills, night sweats, wt loss/gain, change in appetite, fatigue, somnolence)     HEENT: (ear pain or discharge, hearing loss, ear ringing, sinus pressure, nosebleed, nasal discharge, sore throat, oral sores, tooth pain, bleeding gums, hoarse voice, neck pain)      Cardiovascular: (HTN, chest pain, elevated cholesterol/lipids, palpitations, leg swelling, leg pain with walking)     Respiratory: (cough, wheezing, snoring, SOB with activity (dyspnea), SOB while lying flat (orthopnea), awakening with severe SOB (paroxysmal nocturnal dyspnea))     Gastrointestinal: (NVD, constipation, abdominal pain, bright red stools, black tarry stools, stool incontinence)     Genitourinary:  (pelvic pain, burning or frequency of urination, urinary urgency, blood in urine incomplete bladder emptying, urinary incontinence, STD; MEN: testicular pain or swelling, erectile dysfunction; WOMEN: LMP, heavy menstrual bleeding (menorrhagia), irregular periods, postmenopausal bleeding, menstrual pain (dymenorrhea, vaginal discharge)     Musculoskeletal: (bone pain/fracture, joint pain or swelling, musle pain)     Integumentary: (rashes, acne, non-healing sores, itching, breast lumps, breast pain, nipple discharge, hair loss)     Neurologic: (HA, muscle weakness, paresthesias (numbness, coldness, crawling or prickling), memory loss, seizure, dizziness)     Psychiatric:  (anxiety, sadness, irritability/anger, insomnia, suicidality)     Endocrine: (heat or cold intolerance, excessive thirst (polydipsia), excessive hunger (polyphagia))     Immune/Allergic: (hives, seasonal or environmental allergies, HIV exposure)     Hematologic/Lymphatic: (lymph node enlargement, easy bleeding or bruising)     History obtained via chart review and patient     PCP is Madalyn Duarte         Current Meds:     Home Medications           Prior to Admission medications    Medication Sig Start Date End Date Taking?  Authorizing Provider   DULoxetine (CYMBALTA) 60 MG extended release capsule TAKE ONE CAPSULE BY MOUTH EVERY DAY IN January, 2020, uses a BPAP      Social Determinants of Health          Financial Resource Strain:     Difficulty of Paying Living Expenses:    Food Insecurity:     Worried About Running Out of Food in the Last Year:     [address] in the Last Year:    Transportation Needs:     Lack of Transportation (Medical):  Lack of Transportation (Non-Medical):    Physical Activity:     Days of Exercise per Week:     Minutes of Exercise per Session:    Stress:     Feeling of Stress :    Social Connections:     Frequency of Communication with Friends and Family:     Frequency of Social Gatherings with Friends and Family:     Attends Gnosticist Services:     Active Member of Clubs or Organizations:     Attends Club or Organization Meetings:     Marital Status:    Intimate Partner Violence:     Fear of Current or Ex-Partner:     Emotionally Abused:     Physically Abused:     Sexually Abused:             MSE:  Patient is  A & O x 4. Appearance:  HALIMA  Cognition:  Recent memory intact , remote memory intact , good fund of knowledge, average  intelligence level. Speech:  normal  Language: Naming: intact;  Word Finding: intact  Conversation no evidence of delusions  Behavior:  Cooperative  Mood: \"good\"  Affect: HALIMA  Thought Content: negative delusions, negative hallucinations, negative obsessions,  negativehomicidal and negative suicidal   Thought Process: linear, goal directed and coherent  Associations: logical connections  Attention Span and concentration: Normal   Judgement Insight:  normal and appropriate  Gait and Station: HALIMA   Sleep:  avg 5-6 hrs, waking up around 5am (is not using her Bipap)  Appetite: ok        No results found for: NA, K, CL, CO2, BUN, CREATININE, GLUCOSE, CALCIUM, PROT, LABALBU, BILITOT, ALKPHOS, AST, ALT, LABGLOM, GFRAA, AGRATIO, GLOB  No results found for: NA, K, CL, CO2, BUN, CREATININE, GLUCOSE, CALCIUM   No results found for: CHOL  No results found for: TRIG  No results found for: HDL  No results found for: LDLCHOLESTEROL, LDLCALC  No results found for: LABVLDL, VLDL  No results found for: CHOLHDLRATIO  No results found for: LABA1C  No results found for: EAG  No results found for: TSHFT4, TSH  No results found for: VITD25     Assessments Administered:     PHQ9: 11, moderate  GAD7: 12, moderate (panic attacks about once a week)     Assessment:    1. Mild episode of recurrent major depressive disorder ([facility] [address]    2. Generalized anxiety disorder with panic attacks    3. Insomnia due to medical condition          Plan:  Continue:  Cymbalta, 60mg, daily in the morning  Cymbalta, 30mg, daily with the evening meal  Doxepin, 10mg, nightly as needed for sleep (not taking regularly)  Buspirone, 15mg, three times daily (is currently only taking in the morning)     (Follow up with restarting your BiPap)        Try an moe called Juli Vanessa or find something on YouTube which will stimulate the delta sleep waves. It might be helpful for promoting sleep. Finding an alternative equipment which would not bother you so much with your BiPap machine would also be optimal.     Follow up: Return in about 4 months (around 1/27/2022).     1. The risks, benefits, side effects, indications, contraindications, and adverse effects of the medications have been discussed. Yes.  2. The pt has verbalized understanding and has capacity to give informed consent. 3. The Yolande Tovar report has been reviewed according to [facility] regulations. 4. Supportive therapy offered. 5. Follow up:    Return in about 4 months (around 1/27/2022). 6. The patient has been advised to call with any problems. 7. Controlled substance Treatment Plan: none.   8. The above listed medications have been continued, modifications in meds and other orders/labs as follows:                 Encounter Medications         Orders Placed This Encounter   Medications    DULoxetine (CYMBALTA) 60 MG extended release capsule       Sig: TAKE ONE CAPSULE BY MOUTH EVERY DAY IN THE MORNING       Dispense:  90 capsule       Refill:  0    DULoxetine (CYMBALTA) 30 MG extended release capsule       Sig: Take 1 capsule by mouth daily With the evening meal       Dispense:  90 capsule       Refill:  0    doxepin (SINEQUAN) 10 MG capsule       Sig: Take 1 capsule by mouth nightly       Dispense:  90 capsule       Refill:  0    busPIRone (BUSPAR) 15 MG tablet       Sig: TAKE ONE TABLET BY MOUTH THREE TIMES A DAY       Dispense:  270 tablet       Refill:  0                     No orders of the defined types were placed in this encounter.        9. Additional comments: Her PHQ9 and GAD7 scores are increased. She endorses feeling tired and sleeping a lot during the day. Her actual depression score on the PHQ9 was 0. The higher scores are probably related to sleep apnea and not mood. She was encouraged to find a solution to using her BiPap again. She verbalized understanding. Will make no medication changes today. She will follow up with another provider in this office in about 4 months. .. .     10. Over 50% of the total visit time of 15 minutes was spent on counseling and/or coordination of care of:                         1. Mild episode of recurrent major depressive disorder ([facility] [address]    2. Generalized anxiety disorder with panic attacks    3.  Insomnia due to medical condition                        Psychotherapy Topics: mood/medication effectiveness         ANDRIY Blum NP PMLUIS MP-BC

## 2022-01-18 ENCOUNTER — HOSPITAL ENCOUNTER (OUTPATIENT)
Dept: MRI IMAGING | Facility: HOSPITAL | Age: 42
Discharge: HOME | End: 2022-01-18
Admitting: NURSE PRACTITIONER

## 2022-01-18 DIAGNOSIS — M47.816 LUMBAR SPONDYLOSIS: ICD-10-CM

## 2022-01-18 PROCEDURE — 72148 MRI LUMBAR SPINE W/O DYE: CPT

## 2022-01-26 ENCOUNTER — TELEPHONE (OUTPATIENT)
Dept: PSYCHIATRY | Age: 42
End: 2022-01-26

## 2022-01-26 NOTE — TELEPHONE ENCOUNTER
Called and lm for pt to call me back    When pt does call back , I will remind her of her appt for [email or web address]    Pt called back and I reminded her of her appt     Electronically signed by Vandana Castillo on 1/26/2022 at 11:50 AM

## 2022-01-27 ENCOUNTER — OFFICE VISIT (OUTPATIENT)
Dept: PSYCHIATRY | Age: 42
End: 2022-01-27
Payer: COMMERCIAL

## 2022-01-27 VITALS
SYSTOLIC BLOOD PRESSURE: 142 MMHG | HEART RATE: 81 BPM | TEMPERATURE: 96.6 F | BODY MASS INDEX: 45.67 KG/M2 | HEIGHT: 62 IN | OXYGEN SATURATION: 97 % | DIASTOLIC BLOOD PRESSURE: 90 MMHG | WEIGHT: 248.2 LBS

## 2022-01-27 DIAGNOSIS — F41.1 GENERALIZED ANXIETY DISORDER WITH PANIC ATTACKS: ICD-10-CM

## 2022-01-27 DIAGNOSIS — F33.0 MILD EPISODE OF RECURRENT MAJOR DEPRESSIVE DISORDER (HCC): Primary | ICD-10-CM

## 2022-01-27 DIAGNOSIS — F41.0 GENERALIZED ANXIETY DISORDER WITH PANIC ATTACKS: ICD-10-CM

## 2022-01-27 PROCEDURE — 99214 OFFICE O/P EST MOD 30 MIN: CPT | Performed by: NURSE PRACTITIONER

## 2022-01-27 RX ORDER — BUSPIRONE HYDROCHLORIDE 15 MG/1
15 TABLET ORAL 2 TIMES DAILY
Qty: 60 TABLET | Refills: 2
Start: 2022-01-27 | End: 2022-07-18 | Stop reason: SDUPTHER
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
## 2022-04-18 ENCOUNTER — TELEPHONE (OUTPATIENT)
Dept: PSYCHIATRY | Age: 42
End: 2022-04-18

## 2022-04-18 DIAGNOSIS — F33.0 MILD EPISODE OF RECURRENT MAJOR DEPRESSIVE DISORDER (HCC): ICD-10-CM

## 2022-04-18 RX ORDER — DULOXETIN HYDROCHLORIDE 60 MG/1
CAPSULE, DELAYED RELEASE ORAL
Qty: 90 CAPSULE | Refills: 0 | Status: SHIPPED | OUTPATIENT
Start: 2022-04-18 | End: 2022-07-22
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
## 2022-04-18 NOTE — TELEPHONE ENCOUNTER
Called and let pt know that her script for duloxetine was sent to [facility]    Electronically signed by Yashira Ratliff on 4/18/2022 at 4:03 PM

## 2022-05-27 ENCOUNTER — OFFICE VISIT (OUTPATIENT)
Dept: PSYCHIATRY | Age: 42
End: 2022-05-27
Payer: COMMERCIAL

## 2022-05-27 VITALS
OXYGEN SATURATION: 96 % | WEIGHT: 251.5 LBS | TEMPERATURE: 97.4 F | HEIGHT: 61 IN | BODY MASS INDEX: 47.48 KG/M2 | SYSTOLIC BLOOD PRESSURE: 130 MMHG | DIASTOLIC BLOOD PRESSURE: 78 MMHG | HEART RATE: 86 BPM

## 2022-05-27 DIAGNOSIS — F33.41 RECURRENT MAJOR DEPRESSIVE DISORDER, IN PARTIAL REMISSION (HCC): Primary | ICD-10-CM

## 2022-05-27 PROCEDURE — 99214 OFFICE O/P EST MOD 30 MIN: CPT | Performed by: NURSE PRACTITIONER

## 2022-05-27 ASSESSMENT — PATIENT HEALTH QUESTIONNAIRE - PHQ9
SUM OF ALL RESPONSES TO PHQ9 QUESTIONS 1 & 2: 0
6. FEELING BAD ABOUT YOURSELF - OR THAT YOU ARE A FAILURE OR HAVE LET YOURSELF OR YOUR FAMILY DOWN: 0
1. LITTLE INTEREST OR PLEASURE IN DOING THINGS: 0
4. FEELING TIRED OR HAVING LITTLE ENERGY: 0
8. MOVING OR SPEAKING SO SLOWLY THAT OTHER PEOPLE COULD HAVE NOTICED. OR THE OPPOSITE, BEING SO FIGETY OR RESTLESS THAT YOU HAVE BEEN MOVING AROUND A LOT MORE THAN USUAL: 0
SUM OF ALL RESPONSES TO PHQ QUESTIONS 1-9: 0
5. POOR APPETITE OR OVEREATING: 0
2. FEELING DOWN, DEPRESSED OR HOPELESS: 0
SUM OF ALL RESPONSES TO PHQ QUESTIONS 1-9: 0
9. THOUGHTS THAT YOU WOULD BE BETTER OFF DEAD, OR OF HURTING YOURSELF: 0
SUM OF ALL RESPONSES TO PHQ QUESTIONS 1-9: 0
10. IF YOU CHECKED OFF ANY PROBLEMS, HOW DIFFICULT HAVE THESE PROBLEMS MADE IT FOR YOU TO DO YOUR WORK, TAKE CARE OF THINGS AT HOME, OR GET ALONG WITH OTHER PEOPLE: 0
3. TROUBLE FALLING OR STAYING ASLEEP: 0
SUM OF ALL RESPONSES TO PHQ QUESTIONS 1-9: 0
7. TROUBLE CONCENTRATING ON THINGS, SUCH AS READING THE NEWSPAPER OR WATCHING TELEVISION: 0

## 2022-05-27 NOTE — PROGRESS NOTES
5/27/22        Progress Note    Audrey Rawls 1980      Chief Complaint   Patient presents with    Medication Check    Follow-up         Subjective:    Patient is a 39 y.o. female diagnosed with MDD and presents today for follow-up. Last seen in clinic on 1/27/2022  and prior records were reviewed. Last visit: Feeling tired today, but she thinks she has an infection. Had a panic attack last week where she felt her heart was fluttering. Her mood has been good. She states the medication is working. She has tried trials of not taking her medication and she can tell when she cuts back that it is helping. She reported that she does not take her cymbalta in the evening the majority of the time. She states she takes it about once every couple of weeks. We discussed the importance of this medication and the level it needs to work effectively, she stated that she only wants to take it in the morning. Additionally she stated that she is only taking BuSpar 15 mg in the morning we discussed that this medication works better taken 3 times a day however it can be taken twice a day. She stated that she would like to try taking it twice a day. She reports not taking doxepin so this medication was discontinued this time. She currently has been diagnosed with sleep apnea however she does not use her BiPAP she stated that she cannot tolerate the mask we discussed sleep apnea at length. She was encouraged to follow-up with neurology for alternate mask options. She is calm and cooperative she denies SI HI AVH she denies side effects of medications at this time. We will follow-up in 4 months    Today patient states, \"good\" anxiety and depression have well controlled. She reports home is going well. Racing thoughts before bed have subsided. She has been really involved in self care and coping strategies. occasionally takes naps during midday.   She has been remitted of symptoms for several months she is asking to start coming off of medications at this time. We discussed decreasing Buspar to 7.5 mg daily but keeping cymbalta at 61. Will follow up in 4 months, if symptoms are still remitted we will discuss decreasing Cymbalta to 30 mg daily. Pt is agreeable to plan. She denies si hi avh, she denies side effects of medications. She is off work right now due to a knee surgery, she returns to work in July. Absent  suicidal ideation. Reports compliance with medications as good . Sleep: not great. Goes to bed around 9 wakes up around 5 or so. Does not have a cpap, could not tolerate the mask.   Encouraged to follow with neuro for sleep apnea.       Appetite: a little increased     Caffeine use:      Support:  ,    PREVIOUS MED TRIALS  BuSpar  Doxepin  Cymbalta      Current Substance Use:   Alcohol: Denies   illicit drug use: Denies   Marijuana: Denies   Tobacco use: Denies   Vape: Denies       BP: /78   Pulse 86   Temp 97.4 °F (36.3 °C)   Ht 5' 1\" (1.549 m)   Wt 251 lb 8 oz (114.1 kg)   SpO2 96%   BMI 47.52 kg/m²       Review of Systems - 14 point review:  Negative except for sleep apnea, back and knee pain,     Constitutional: (fevers, chills, night sweats, wt loss/gain, change in appetite, fatigue, somnolence)    HEENT: (ear pain or discharge, hearing loss, ear ringing, sinus pressure, nosebleed, nasal discharge, sore throat, oral sores, tooth pain, bleeding gums, hoarse voice, neck pain)      Cardiovascular: (HTN, chest pain, elevated cholesterol/lipids, palpitations, leg swelling, leg pain with walking)    Respiratory: (cough, wheezing, snoring, SOB with activity (dyspnea), SOB while lying flat (orthopnea), awakening with severe SOB (paroxysmal nocturnal dyspnea))    Gastrointestinal: (NVD, constipation, abdominal pain, bright red stools, black tarry stools, stool incontinence)     Genitourinary:  (pelvic pain, burning or frequency of urination, urinary urgency, blood in urine incomplete bladder emptying, urinary incontinence, STD; MEN: testicular pain or swelling, erectile dysfunction; WOMEN: LMP, heavy menstrual bleeding (menorrhagia), irregular periods, postmenopausal bleeding, menstrual pain (dymenorrhea, vaginal discharge)    Musculoskeletal: (bone pain/fracture, joint pain or swelling, musle pain)    Integumentary: (rashes, acne, non-healing sores, itching, breast lumps, breast pain, nipple discharge, hair loss)    Neurologic: (HA, muscle weakness, paresthesias (numbness, coldness, crawling or prickling), memory loss, seizure, dizziness)    Psychiatric:  (anxiety, sadness, irritability/anger, insomnia, suicidality)    Endocrine: (heat or cold intolerance, excessive thirst (polydipsia), excessive hunger (polyphagia))    Immune/Allergic: (hives, seasonal or environmental allergies, HIV exposure)    Hematologic/Lymphatic: (lymph node enlargement, easy bleeding or bruising)    History obtained via chart review and patient    PCP is Pham Trujillo       Current Meds:    Prior to Admission medications    Medication Sig Start Date End Date Taking? Authorizing Provider   DULoxetine (CYMBALTA) 60 MG extended release capsule TAKE ONE CAPSULE BY MOUTH EVERY DAY IN THE MORNING 4/18/22   ANDRIY Duke CNP   busPIRone (BUSPAR) 15 MG tablet Take 15 mg by mouth 2 times daily 1/27/22   ANDRIY Duke CNP   HYDROcodone-acetaminophen (NORCO) 5-325 MG per tablet Take 1 tablet by mouth 2 times daily.     Historical Provider, MD   pantoprazole (PROTONIX) 40 MG tablet Take 40 mg by mouth daily    Historical Provider, MD   ondansetron (ZOFRAN) 4 MG tablet Take 1 tablet by mouth every 8 hours as needed for Nausea or Vomiting  Patient not taking: Reported on 6/25/2021 1/17/19   Dee Valdez MD   calcium-vitamin D (OSCAL) 250-125 MG-UNIT per tablet Take 1 tablet by mouth daily  Patient not taking: Reported on 6/25/2021    Historical Provider, MD   DOCUSATE SODIUM PO Take 100 mg by mouth Abused: Not on file    Physically Abused: Not on file    Sexually Abused: Not on file   Housing Stability:     Unable to Pay for Housing in the Last Year: Not on file    Number of Places Lived in the Last Year: Not on file    Unstable Housing in the Last Year: Not on file       MSE:  Appearance: Appropriately groomed. Made good eye contact. Gait stable. No abnormal movements or tremor. Behavior: Calm, cooperative, and socially appropriate. No psychomotor retardation/agitation appreciated. Speech: Normal in tone, volume, and quality. No slurring, dysarthria or pressured speech noted. Mood: \"great\"   Affect: Mood congruent. Thought Process: Appears linear, logical and goal oriented. Causality appears intact. Thought Content: Denies active suicidal and homicidal ideations. No overt delusions or paranoia appreciated. Perceptions: Denies auditory or visual hallucinations at present time. Not responding to internal stimuli. Concentration: Intact. Orientation: to person, place, date, and situation. Language: Intact. Fund of information: Intact. Memory: Recent and remote appear intact. Impulsivity: Limited. Neurovegitative: Fair appetite and sleep. Insight: Fair. Judgment: Fair. Cognition: Can spell \"world\" backwards: Yes                    Can do serial 7's: Yes    No results found for: NA, K, CL, CO2, BUN, CREATININE, GLUCOSE, CALCIUM, PROT, LABALBU, BILITOT, ALKPHOS, AST, ALT, LABGLOM, GFRAA, AGRATIO, GLOB  No results found for: NA, K, CL, CO2, BUN, CREATININE, GLUCOSE, CALCIUM   No results found for: CHOL  No results found for: TRIG  No results found for: HDL  No results found for: LDLCHOLESTEROL, LDLCALC  No results found for: LABVLDL, VLDL  No results found for: CHOLHDLRATIO  No results found for: LABA1C  No results found for: EAG  No results found for: TSHFT4, TSH  No results found for: VITD25  No results found for: [id number]   No results found for: FOLATE     Assessment:   1. Recurrent major depressive disorder, in partial remission (HCC)        No evidence of acute suicidality, homicidality or psychosis observed. Patient is psychiatrically stable    Plan:    1. Continue   Cymbalta, 60mg, daily in the morning  Buspirone, 7.5 mg,  time daily      (Follow up with restarting your BiPap)     Start     Discontinue      The risks, benefits, side effects, indications, contraindications, and adverse effects of the medications have been discussed. Yes.  2. The pt has verbalized understanding and has capacity to give informed consent. 3. The [place] report has been reviewed according to [facility] regulations. 4. Supportive therapy offered. 5. Follow up: Return in about 4 months (around 9/27/2022). 6. The patient has been advised to call with any problems. 7. Controlled substance Treatment Plan: NA.  8. The above listed medications have been continued, modifications in meds and other orders/labs as follows: No orders of the defined types were placed in this encounter. No orders of the defined types were placed in this encounter. 9. Additional comments: Continue therapy, discussed sleep hygiene, discussed the use of coping skills and relaxation strategies to manage symptoms. 10.Over 50% of the total visit time of   30  minutes was spent on counseling and/or coordination of care of:                        1. Recurrent major depressive disorder, in partial remission [facility])                      Psychotherapy Topics: mood/medication effectiveness family, health and occupational    Baljit Knight, ANDRIY - CNP    This dictation was generated by voice recognition computer software. Although all attempts are made to edit the dictation for accuracy, there may be errors in the transcription that are not intended.

## 2022-07-18 ENCOUNTER — TELEPHONE (OUTPATIENT)
Dept: PSYCHIATRY | Age: 42
End: 2022-07-18

## 2022-07-18 RX ORDER — BUSPIRONE HYDROCHLORIDE 15 MG/1
15 TABLET ORAL 3 TIMES DAILY
Qty: 90 TABLET | Refills: 2 | Status: SHIPPED | OUTPATIENT
Start: 2022-07-18 | End: 2022-09-08 | Stop reason: SDUPTHER

## 2022-07-18 NOTE — TELEPHONE ENCOUNTER
Called and let pt know that her script for buspirone was sent to his pharmacy    Electronically signed by Jayme Dubon on 7/18/2022 at 1:32 PM

## 2022-07-18 NOTE — TELEPHONE ENCOUNTER
Pharmacy sent a request to refill pt's medication. Last office visit : 5/27/2022 Sameerlorie Eran ASHRAF  Next office visit : 9/28/2022 Mary Barillas ANDRIY    Requested Prescriptions     Pending Prescriptions Disp Refills    busPIRone (BUSPAR) 15 MG tablet 60 tablet 2     Sig: Take 15 mg by mouth in the morning and 15 mg at noon and 15 mg before bedtime. Maru Gutiérrez        5/27/22                                         Progress Note     Ayana Cushing 1980                           Chief Complaint   Patient presents with    Medication Check    Follow-up            Subjective:    Patient is a 39 y.o. female diagnosed with MDD and presents today for follow-up. Last seen in clinic on 1/27/2022  and prior records were reviewed. Last visit: Feeling tired today, but she thinks she has an infection. Had a panic attack last week where she felt her heart was fluttering. Her mood has been good. She states the medication is working. She has tried trials of not taking her medication and she can tell when she cuts back that it is helping. She reported that she does not take her cymbalta in the evening the majority of the time. She states she takes it about once every couple of weeks. We discussed the importance of this medication and the level it needs to work effectively, she stated that she only wants to take it in the morning. Additionally she stated that she is only taking BuSpar 15 mg in the morning we discussed that this medication works better taken 3 times a day however it can be taken twice a day. She stated that she would like to try taking it twice a day. She reports not taking doxepin so this medication was discontinued this time. She currently has been diagnosed with sleep apnea however she does not use her BiPAP she stated that she cannot tolerate the mask we discussed sleep apnea at length. She was encouraged to follow-up with neurology for alternate mask options.   She is calm and cholesterol/lipids, palpitations, leg swelling, leg pain with walking)     Respiratory: (cough, wheezing, snoring, SOB with activity (dyspnea), SOB while lying flat (orthopnea), awakening with severe SOB (paroxysmal nocturnal dyspnea))     Gastrointestinal: (NVD, constipation, abdominal pain, bright red stools, black tarry stools, stool incontinence)     Genitourinary:  (pelvic pain, burning or frequency of urination, urinary urgency, blood in urine incomplete bladder emptying, urinary incontinence, STD; MEN: testicular pain or swelling, erectile dysfunction; WOMEN: LMP, heavy menstrual bleeding (menorrhagia), irregular periods, postmenopausal bleeding, menstrual pain (dymenorrhea, vaginal discharge)     Musculoskeletal: (bone pain/fracture, joint pain or swelling, musle pain)     Integumentary: (rashes, acne, non-healing sores, itching, breast lumps, breast pain, nipple discharge, hair loss)     Neurologic: (HA, muscle weakness, paresthesias (numbness, coldness, crawling or prickling), memory loss, seizure, dizziness)     Psychiatric:  (anxiety, sadness, irritability/anger, insomnia, suicidality)     Endocrine: (heat or cold intolerance, excessive thirst (polydipsia), excessive hunger (polyphagia))     Immune/Allergic: (hives, seasonal or environmental allergies, HIV exposure)     Hematologic/Lymphatic: (lymph node enlargement, easy bleeding or bruising)     History obtained via chart review and patient     PCP is Birgit Arevalo         Current Meds:     Home Medications           Prior to Admission medications   Medication Sig Start Date End Date Taking?  Authorizing Provider   DULoxetine (CYMBALTA) 60 MG extended release capsule TAKE ONE CAPSULE BY MOUTH EVERY DAY IN THE MORNING 4/18/22     ANDRIY Diallo CNP   busPIRone (BUSPAR) 15 MG tablet Take 15 mg by mouth 2 times daily 1/27/22     ANDRIY Diallo CNP   HYDROcodone-acetaminophen (NORCO) 5-325 MG per tablet Take 1 tablet by mouth 2 times daily.       Historical Provider, MD   pantoprazole (PROTONIX) 40 MG tablet Take 40 mg by mouth daily       Historical Provider, MD   ondansetron (ZOFRAN) 4 MG tablet Take 1 tablet by mouth every 8 hours as needed for Nausea or Vomiting  Patient not taking: Reported on 2021     Mary Good MD   calcium-vitamin D (OSCAL) 250-125 MG-UNIT per tablet Take 1 tablet by mouth daily  Patient not taking: Reported on 2021       Historical Provider, MD   DOCUSATE SODIUM PO Take 100 mg by mouth daily  Patient not taking: Reported on 2021       Historical Provider, MD   vitamin D (ERGOCALCIFEROL) 56788 units CAPS capsule Take 5,000 Units by mouth once a week 18     Historical Provider, MD   ferrous sulfate 325 (65 Fe) MG tablet Take 325 mg by mouth daily       Historical Provider, MD         Social History   Social History            Socioeconomic History    Marital status:        Spouse name: Not on file    Number of children: Not on file    Years of education: Not on file    Highest education level: Not on file   Occupational History    Not on file   Tobacco Use    Smoking status: Former Smoker       Quit date:        Years since quittin.4    Smokeless tobacco: Never Used   Vaping Use    Vaping Use: Never used   Substance and Sexual Activity    Alcohol use: Never    Drug use: Never    Sexual activity: Not on file   Other Topics Concern    Not on file   Social History Narrative     SLEEP STUDY: Yes, home study, , uses a BPAP      Social Determinants of Health          Financial Resource Strain:    Difficulty of Paying Living Expenses: Not on file   Food Insecurity:    Worried About [address] in the Last Year: Not on file    [address] in the Last Year: Not on file   Transportation Needs:    Lack of Transportation (Medical): Not on file    Lack of Transportation (Non-Medical):  Not on file   Physical Activity:    Days of Exercise per Week: Not on file Minutes of Exercise per Session: Not on file   Stress:    Feeling of Stress : Not on file   Social Connections:    Frequency of Communication with Friends and Family: Not on file    Frequency of Social Gatherings with Friends and Family: Not on file    Attends Baptism Services: Not on file    Active Member of Clubs or Organizations: Not on file    Attends Club or Organization Meetings: Not on file    Marital Status: Not on file   Intimate Partner Violence:    Fear of Current or Ex-Partner: Not on file    Emotionally Abused: Not on file    Physically Abused: Not on file    Sexually Abused: Not on file   Housing Stability:    Unable to Pay for Housing in the Last Year: Not on file    Number of [place] in the Last Year: Not on file    Unstable Housing in the Last Year: Not on file            MSE:  Appearance: Appropriately groomed. Made good eye contact. Gait stable. No abnormal movements or tremor. Behavior: Calm, cooperative, and socially appropriate. No psychomotor retardation/agitation appreciated. Speech: Normal in tone, volume, and quality. No slurring, dysarthria or pressured speech noted. Mood: \"great\"  Affect: Mood congruent. Thought Process: Appears linear, logical and goal oriented. Causality appears intact. Thought Content: Denies active suicidal and homicidal ideations. No overt delusions or paranoia appreciated. Perceptions: Denies auditory or visual hallucinations at present time. Not responding to internal stimuli. Concentration: Intact. Orientation: to person, place, date, and situation. Language: Intact. Fund of information: Intact. Memory: Recent and remote appear intact. Impulsivity: Limited. Neurovegitative: Fair appetite and sleep. Insight: Fair. Judgment: Fair. Cognition: Can spell \"world\" backwards:  Yes                    Can do serial 7's: Yes     No results found for: NA, K, CL, CO2, BUN, CREATININE, GLUCOSE, CALCIUM, PROT, LABALBU, BILITOT, ALKPHOS, AST, ALT, LABGLOM, GFRAA, AGRATIO, GLOB  No results found for: NA, K, CL, CO2, BUN, CREATININE, GLUCOSE, CALCIUM   No results found for: CHOL  No results found for: TRIG  No results found for: HDL  No results found for: LDLCHOLESTEROL, LDLCALC  No results found for: LABVLDL, VLDL  No results found for: CHOLHDLRATIO  No results found for: LABA1C  No results found for: EAG  No results found for: TSHFT4, TSH  No results found for: VITD25  No results found for: BOZVLJWM40   No results found for: FOLATE      Assessment:    1. Recurrent major depressive disorder, in partial remission (HCC)          No evidence of acute suicidality, homicidality or psychosis observed. Patient is psychiatrically stable     Plan:     1. Continue   Cymbalta, 60mg, daily in the morning  Buspirone, 7.5 mg,  time daily      (Follow up with restarting your BiPap)      Start     Discontinue        The risks, benefits, side effects, indications, contraindications, and adverse effects of the medications have been discussed. Yes.  2. The pt has verbalized understanding and has capacity to give informed consent. 3. The Renetta Henderson report has been reviewed according to [facility] regulations. 4. Supportive therapy offered. 5. Follow up:    Return in about 4 months (around 9/27/2022). 6. The patient has been advised to call with any problems. 7. Controlled substance Treatment Plan: NA.  8. The above listed medications have been continued, modifications in meds and other orders/labs as follows:                 Encounter Medications    No orders of the defined types were placed in this encounter. No orders of the defined types were placed in this encounter. 9. Additional comments: Continue therapy, discussed sleep hygiene, discussed the use of coping skills and relaxation strategies to manage symptoms.            10.Over 50% of the total visit time of   30  minutes was spent on counseling and/or coordination of care of:                         1. Recurrent major depressive disorder, in partial remission [facility])                        Psychotherapy Topics: mood/medication effectiveness family, health and occupational     Chavez Valentine, APRN - CNP

## 2022-07-21 DIAGNOSIS — F33.0 MILD EPISODE OF RECURRENT MAJOR DEPRESSIVE DISORDER (HCC): ICD-10-CM

## 2022-07-22 RX ORDER — DULOXETIN HYDROCHLORIDE 60 MG/1
CAPSULE, DELAYED RELEASE ORAL
Qty: 90 CAPSULE | Refills: 0 | Status: SHIPPED | OUTPATIENT
Start: 2022-07-22 | End: 2022-09-08 | Stop reason: SDUPTHER

## 2022-07-22 NOTE — TELEPHONE ENCOUNTER
Pharmacy sent med refill request below. Last office visit : 5/27/2022 with Vira ASHRAF  Next office visit : 9/28/2022 with Vira ASHRAF    Requested Prescriptions     Pending Prescriptions Disp Refills    DULoxetine (CYMBALTA) 60 MG extended release capsule [Pharmacy Med Name: DULOXETINE HCL 60MG CPEP] 90 capsule 0     Sig: TAKE ONE CAPSULE BY MOUTH EVERY MORNING            Birdie Wen RN         5/27/22                                         Progress Note     Lakeisha Dozier 1980                           Chief Complaint   Patient presents with    Medication Check    Follow-up            Subjective:    Patient is a 39 y.o. female diagnosed with MDD and presents today for follow-up. Last seen in clinic on 1/27/2022  and prior records were reviewed. Last visit: Feeling tired today, but she thinks she has an infection. Had a panic attack last week where she felt her heart was fluttering. Her mood has been good. She states the medication is working. She has tried trials of not taking her medication and she can tell when she cuts back that it is helping. She reported that she does not take her cymbalta in the evening the majority of the time. She states she takes it about once every couple of weeks. We discussed the importance of this medication and the level it needs to work effectively, she stated that she only wants to take it in the morning. Additionally she stated that she is only taking BuSpar 15 mg in the morning we discussed that this medication works better taken 3 times a day however it can be taken twice a day. She stated that she would like to try taking it twice a day. She reports not taking doxepin so this medication was discontinued this time. She currently has been diagnosed with sleep apnea however she does not use her BiPAP she stated that she cannot tolerate the mask we discussed sleep apnea at length.   She was encouraged to follow-up with neurology for alternate mask options. She is calm and cooperative she denies SI HI AVH she denies side effects of medications at this time. We will follow-up in 4 months     Today patient states, \"good\" anxiety and depression have well controlled. She reports home is going well. Racing thoughts before bed have subsided. She has been really involved in self care and coping strategies. occasionally takes naps during midday. She has been remitted of symptoms for several months she is asking to start coming off of medications at this time. We discussed decreasing Buspar to 7.5 mg daily but keeping cymbalta at 61. Will follow up in 4 months, if symptoms are still remitted we will discuss decreasing Cymbalta to 30 mg daily. Pt is agreeable to plan. She denies si hi avh, she denies side effects of medications. She is off work right now due to a knee surgery, she returns to work in July. Absent  suicidal ideation. Reports compliance with medications as good . Sleep: not great. Goes to bed around 9 wakes up around 5 or so. Does not have a cpap, could not tolerate the mask. Encouraged to follow with neuro for sleep apnea.         Appetite: a little increased     Caffeine use:     Support:  ,     PREVIOUS MED TRIALS  BuSpar  Doxepin  Cymbalta        Current Substance Use:   Alcohol: Denies  illicit drug use: Denies  Marijuana: Denies  Tobacco use: Denies  Vape: Denies         BP: /78   Pulse 86   Temp 97.4 °F (36.3 °C)   Ht 5' 1\" (1.549 m)   Wt 251 lb 8 oz (114.1 kg)   SpO2 96%   BMI 47.52 kg/m²         Review of Systems - 14 point review:  Negative except for sleep apnea, back and knee pain,      Constitutional: (fevers, chills, night sweats, wt loss/gain, change in appetite, fatigue, somnolence)     HEENT: (ear pain or discharge, hearing loss, ear ringing, sinus pressure, nosebleed, nasal discharge, sore throat, oral sores, tooth pain, bleeding gums, hoarse voice, neck pain) Cardiovascular: (HTN, chest pain, elevated cholesterol/lipids, palpitations, leg swelling, leg pain with walking)     Respiratory: (cough, wheezing, snoring, SOB with activity (dyspnea), SOB while lying flat (orthopnea), awakening with severe SOB (paroxysmal nocturnal dyspnea))     Gastrointestinal: (NVD, constipation, abdominal pain, bright red stools, black tarry stools, stool incontinence)     Genitourinary:  (pelvic pain, burning or frequency of urination, urinary urgency, blood in urine incomplete bladder emptying, urinary incontinence, STD; MEN: testicular pain or swelling, erectile dysfunction; WOMEN: LMP, heavy menstrual bleeding (menorrhagia), irregular periods, postmenopausal bleeding, menstrual pain (dymenorrhea, vaginal discharge)     Musculoskeletal: (bone pain/fracture, joint pain or swelling, musle pain)     Integumentary: (rashes, acne, non-healing sores, itching, breast lumps, breast pain, nipple discharge, hair loss)     Neurologic: (HA, muscle weakness, paresthesias (numbness, coldness, crawling or prickling), memory loss, seizure, dizziness)     Psychiatric:  (anxiety, sadness, irritability/anger, insomnia, suicidality)     Endocrine: (heat or cold intolerance, excessive thirst (polydipsia), excessive hunger (polyphagia))     Immune/Allergic: (hives, seasonal or environmental allergies, HIV exposure)     Hematologic/Lymphatic: (lymph node enlargement, easy bleeding or bruising)     History obtained via chart review and patient     PCP is Dax Soriano         Current Meds:     Home Medications           Prior to Admission medications   Medication Sig Start Date End Date Taking?  Authorizing Provider   DULoxetine (CYMBALTA) 60 MG extended release capsule TAKE ONE CAPSULE BY MOUTH EVERY DAY IN THE MORNING 4/18/22     Torrie Castleman, APRN - CNP   busPIRone (BUSPAR) 15 MG tablet Take 15 mg by mouth 2 times daily 1/27/22     Torrie Castleman, APRN - CNP   HYDROcodone-acetaminophen (NORCO) 5-325 MG per tablet Take 1 tablet by mouth 2 times daily. Historical Provider, MD   pantoprazole (PROTONIX) 40 MG tablet Take 40 mg by mouth daily       Historical Provider, MD   ondansetron (ZOFRAN) 4 MG tablet Take 1 tablet by mouth every 8 hours as needed for Nausea or Vomiting  Patient not taking: Reported on 2021     Joi Mendez MD   calcium-vitamin D (OSCAL) 250-125 MG-UNIT per tablet Take 1 tablet by mouth daily  Patient not taking: Reported on 2021       Historical Provider, MD   DOCUSATE SODIUM PO Take 100 mg by mouth daily  Patient not taking: Reported on 2021       Historical Provider, MD   vitamin D (ERGOCALCIFEROL) 43330 units CAPS capsule Take 5,000 Units by mouth once a week 18     Historical Provider, MD   ferrous sulfate 325 (65 Fe) MG tablet Take 325 mg by mouth daily       Historical Provider, MD         Social History   Social History            Socioeconomic History    Marital status:        Spouse name: Not on file    Number of children: Not on file    Years of education: Not on file    Highest education level: Not on file   Occupational History    Not on file   Tobacco Use    Smoking status: Former Smoker       Quit date:        Years since quittin.4    Smokeless tobacco: Never Used   Vaping Use    Vaping Use: Never used   Substance and Sexual Activity    Alcohol use: Never    Drug use: Never    Sexual activity: Not on file   Other Topics Concern    Not on file   Social History Narrative     SLEEP STUDY: Yes, home study, , uses a BPAP      Social Determinants of Health          Financial Resource Strain:    Difficulty of Paying Living Expenses: Not on file   Food Insecurity:    Worried About [address] in the Last Year: Not on file    [address] in the Last Year: Not on file   Transportation Needs:    Lack of Transportation (Medical): Not on file    Lack of Transportation (Non-Medical):  Not on file   Physical Activity: Days of Exercise per Week: Not on file    Minutes of Exercise per Session: Not on file   Stress:    Feeling of Stress : Not on file   Social Connections:    Frequency of Communication with Friends and Family: Not on file    Frequency of Social Gatherings with Friends and Family: Not on file    Attends Yazidi Services: Not on file    Active Member of Clubs or Organizations: Not on file    Attends Club or Organization Meetings: Not on file    Marital Status: Not on file   Intimate Partner Violence:    Fear of Current or Ex-Partner: Not on file    Emotionally Abused: Not on file    Physically Abused: Not on file    Sexually Abused: Not on file   Housing Stability:    Unable to Pay for Housing in the Last Year: Not on file    Number of [place] in the Last Year: Not on file    Unstable Housing in the Last Year: Not on file            MSE:  Appearance: Appropriately groomed. Made good eye contact. Gait stable. No abnormal movements or tremor. Behavior: Calm, cooperative, and socially appropriate. No psychomotor retardation/agitation appreciated. Speech: Normal in tone, volume, and quality. No slurring, dysarthria or pressured speech noted. Mood: \"great\"  Affect: Mood congruent. Thought Process: Appears linear, logical and goal oriented. Causality appears intact. Thought Content: Denies active suicidal and homicidal ideations. No overt delusions or paranoia appreciated. Perceptions: Denies auditory or visual hallucinations at present time. Not responding to internal stimuli. Concentration: Intact. Orientation: to person, place, date, and situation. Language: Intact. Fund of information: Intact. Memory: Recent and remote appear intact. Impulsivity: Limited. Neurovegitative: Fair appetite and sleep. Insight: Fair. Judgment: Fair. Cognition: Can spell \"world\" backwards:  Yes                    Can do serial 7's: Yes     No results found for: NA, K, CL, CO2, BUN, CREATININE, GLUCOSE, CALCIUM, coordination of care of:                         1. Recurrent major depressive disorder, in partial remission [facility])                        Psychotherapy Topics: mood/medication effectiveness family, health and occupational     Janina Fenton, APRN - CNP

## 2022-09-08 ENCOUNTER — TELEPHONE (OUTPATIENT)
Dept: PSYCHIATRY | Age: 42
End: 2022-09-08

## 2022-09-08 DIAGNOSIS — F33.0 MILD EPISODE OF RECURRENT MAJOR DEPRESSIVE DISORDER (HCC): ICD-10-CM

## 2022-09-08 RX ORDER — BUSPIRONE HYDROCHLORIDE 15 MG/1
15 TABLET ORAL 3 TIMES DAILY
Qty: 90 TABLET | Refills: 2 | Status: SHIPPED | OUTPATIENT
Start: 2022-09-08

## 2022-09-08 RX ORDER — DULOXETIN HYDROCHLORIDE 60 MG/1
CAPSULE, DELAYED RELEASE ORAL
Qty: 90 CAPSULE | Refills: 0 | Status: SHIPPED | OUTPATIENT
Start: 2022-09-08

## 2022-09-08 NOTE — TELEPHONE ENCOUNTER
Called and lvm letting pt know that her scripts for buspirone and duloxetine was sent to her pharmacy     Electronically signed by Emma Loco on 9/8/2022 at 11:39 AM

## 2022-09-08 NOTE — TELEPHONE ENCOUNTER
encouraged to follow-up with neurology for alternate mask options. She is calm and cooperative she denies SI HI AVH she denies side effects of medications at this time. We will follow-up in 4 months     Today patient states, \"good\" anxiety and depression have well controlled. She reports home is going well. Racing thoughts before bed have subsided. She has been really involved in self care and coping strategies. occasionally takes naps during midday. She has been remitted of symptoms for several months she is asking to start coming off of medications at this time. We discussed decreasing Buspar to 7.5 mg daily but keeping cymbalta at 61. Will follow up in 4 months, if symptoms are still remitted we will discuss decreasing Cymbalta to 30 mg daily. Pt is agreeable to plan. She denies si hi avh, she denies side effects of medications. She is off work right now due to a knee surgery, she returns to work in July. Absent  suicidal ideation. Reports compliance with medications as good . Sleep: not great. Goes to bed around 9 wakes up around 5 or so. Does not have a cpap, could not tolerate the mask. Encouraged to follow with neuro for sleep apnea.         Appetite: a little increased     Caffeine use:      Support:  ,     PREVIOUS MED TRIALS  BuSpar  Doxepin  Cymbalta        Current Substance Use:   Alcohol: Denies   illicit drug use: Denies   Marijuana: Denies   Tobacco use: Denies   Vape: Denies         BP: /78   Pulse 86   Temp 97.4 °F (36.3 °C)   Ht 5' 1\" (1.549 m)   Wt 251 lb 8 oz (114.1 kg)   SpO2 96%   BMI 47.52 kg/m²         Review of Systems - 14 point review:  Negative except for sleep apnea, back and knee pain,      Constitutional: (fevers, chills, night sweats, wt loss/gain, change in appetite, fatigue, somnolence)     HEENT: (ear pain or discharge, hearing loss, ear ringing, sinus pressure, nosebleed, nasal discharge, sore throat, oral sores, tooth pain, bleeding gums, hoarse voice, neck pain)      Cardiovascular: (HTN, chest pain, elevated cholesterol/lipids, palpitations, leg swelling, leg pain with walking)     Respiratory: (cough, wheezing, snoring, SOB with activity (dyspnea), SOB while lying flat (orthopnea), awakening with severe SOB (paroxysmal nocturnal dyspnea))     Gastrointestinal: (NVD, constipation, abdominal pain, bright red stools, black tarry stools, stool incontinence)     Genitourinary:  (pelvic pain, burning or frequency of urination, urinary urgency, blood in urine incomplete bladder emptying, urinary incontinence, STD; MEN: testicular pain or swelling, erectile dysfunction; WOMEN: LMP, heavy menstrual bleeding (menorrhagia), irregular periods, postmenopausal bleeding, menstrual pain (dymenorrhea, vaginal discharge)     Musculoskeletal: (bone pain/fracture, joint pain or swelling, musle pain)     Integumentary: (rashes, acne, non-healing sores, itching, breast lumps, breast pain, nipple discharge, hair loss)     Neurologic: (HA, muscle weakness, paresthesias (numbness, coldness, crawling or prickling), memory loss, seizure, dizziness)     Psychiatric:  (anxiety, sadness, irritability/anger, insomnia, suicidality)     Endocrine: (heat or cold intolerance, excessive thirst (polydipsia), excessive hunger (polyphagia))     Immune/Allergic: (hives, seasonal or environmental allergies, HIV exposure)     Hematologic/Lymphatic: (lymph node enlargement, easy bleeding or bruising)     History obtained via chart review and patient     PCP is Scar Bach         Current Meds:     Home Medications           Prior to Admission medications    Medication Sig Start Date End Date Taking?  Authorizing Provider   DULoxetine (CYMBALTA) 60 MG extended release capsule TAKE ONE CAPSULE BY MOUTH EVERY DAY IN THE MORNING 4/18/22     OrANDRIY Salazar - CNP   busPIRone (BUSPAR) 15 MG tablet Take 15 mg by mouth 2 times daily 1/27/22     OrANDRIY Salazar - RUDOLPH HYDROcodone-acetaminophen (NORCO) 5-325 MG per tablet Take 1 tablet by mouth 2 times daily. Historical Provider, MD   pantoprazole (PROTONIX) 40 MG tablet Take 40 mg by mouth daily       Historical Provider, MD   ondansetron (ZOFRAN) 4 MG tablet Take 1 tablet by mouth every 8 hours as needed for Nausea or Vomiting  Patient not taking: Reported on 2021     Viktoria Babinski, MD   calcium-vitamin D (OSCAL) 250-125 MG-UNIT per tablet Take 1 tablet by mouth daily  Patient not taking: Reported on 2021       Historical Provider, MD   DOCUSATE SODIUM PO Take 100 mg by mouth daily  Patient not taking: Reported on 2021       Historical Provider, MD   vitamin D (ERGOCALCIFEROL) 33805 units CAPS capsule Take 5,000 Units by mouth once a week 18     Historical Provider, MD   ferrous sulfate 325 (65 Fe) MG tablet Take 325 mg by mouth daily       Historical Provider, MD         Social History   Social History            Socioeconomic History    Marital status:        Spouse name: Not on file    Number of children: Not on file    Years of education: Not on file    Highest education level: Not on file   Occupational History    Not on file   Tobacco Use    Smoking status: Former Smoker       Quit date:        Years since quittin.4    Smokeless tobacco: Never Used   Vaping Use    Vaping Use: Never used   Substance and Sexual Activity    Alcohol use: Never    Drug use: Never    Sexual activity: Not on file   Other Topics Concern    Not on file   Social History Narrative     SLEEP STUDY: Yes, home study, , uses a BPAP      Social Determinants of Health          Financial Resource Strain:     Difficulty of Paying Living Expenses: Not on file   Food Insecurity:     Worried About [address] in the Last Year: Not on file    [address] in the Last Year: Not on file   Transportation Needs:     Lack of Transportation (Medical):  Not on file    Lack of Transportation (Non-Medical): Not on file   Physical Activity:     Days of Exercise per Week: Not on file    Minutes of Exercise per Session: Not on file   Stress:     Feeling of Stress : Not on file   Social Connections:     Frequency of Communication with Friends and Family: Not on file    Frequency of Social Gatherings with Friends and Family: Not on file    Attends [employer] Services: Not on file    Active Member of Clubs or Organizations: Not on file    Attends Club or Organization Meetings: Not on file    Marital Status: Not on file   Intimate Partner Violence:     Fear of Current or Ex-Partner: Not on file    Emotionally Abused: Not on file    Physically Abused: Not on file    Sexually Abused: Not on file   Housing Stability:     Unable to Pay for Housing in the Last Year: Not on file    Number of [place] in the Last Year: Not on file    Unstable Housing in the Last Year: Not on file            MSE:  Appearance: Appropriately groomed. Made good eye contact. Gait stable. No abnormal movements or tremor. Behavior: Calm, cooperative, and socially appropriate. No psychomotor retardation/agitation appreciated. Speech: Normal in tone, volume, and quality. No slurring, dysarthria or pressured speech noted. Mood: \"great\"   Affect: Mood congruent. Thought Process: Appears linear, logical and goal oriented. Causality appears intact. Thought Content: Denies active suicidal and homicidal ideations. No overt delusions or paranoia appreciated. Perceptions: Denies auditory or visual hallucinations at present time. Not responding to internal stimuli. Concentration: Intact. Orientation: to person, place, date, and situation. Language: Intact. Fund of information: Intact. Memory: Recent and remote appear intact. Impulsivity: Limited. Neurovegitative: Fair appetite and sleep. Insight: Fair. Judgment: Fair. Cognition: Can spell \"world\" backwards:  Yes                    Can do serial 7's: Yes     No results found for: NA, K, CL, CO2, BUN, CREATININE, GLUCOSE, CALCIUM, PROT, LABALBU, BILITOT, ALKPHOS, AST, ALT, LABGLOM, GFRAA, AGRATIO, GLOB  No results found for: NA, K, CL, CO2, BUN, CREATININE, GLUCOSE, CALCIUM   No results found for: CHOL  No results found for: TRIG  No results found for: HDL  No results found for: LDLCHOLESTEROL, LDLCALC  No results found for: LABVLDL, VLDL  No results found for: CHOLHDLRATIO  No results found for: LABA1C  No results found for: EAG  No results found for: TSHFT4, TSH  No results found for: VITD25  No results found for: EDXMWXWE16   No results found for: FOLATE      Assessment:    1. Recurrent major depressive disorder, in partial remission (HCC)          No evidence of acute suicidality, homicidality or psychosis observed. Patient is psychiatrically stable     Plan:     1. Continue   Cymbalta, 60mg, daily in the morning  Buspirone, 7.5 mg,  time daily      (Follow up with restarting your BiPap)      Start      Discontinue        The risks, benefits, side effects, indications, contraindications, and adverse effects of the medications have been discussed. Yes.  2. The pt has verbalized understanding and has capacity to give informed consent. 3. The [place] report has been reviewed according to [facility] regulations. 4. Supportive therapy offered. 5. Follow up:    Return in about 4 months (around 9/27/2022). 6. The patient has been advised to call with any problems. 7. Controlled substance Treatment Plan: NA.  8. The above listed medications have been continued, modifications in meds and other orders/labs as follows:                 Encounter Medications    No orders of the defined types were placed in this encounter. No orders of the defined types were placed in this encounter. 9. Additional comments: Continue therapy, discussed sleep hygiene, discussed the use of coping skills and relaxation strategies to manage symptoms.             10.Over 50% of the total visit time of   30  minutes was spent on counseling and/or coordination of care of:                         1. Recurrent major depressive disorder, in partial remission [facility])                        Psychotherapy Topics: mood/medication effectiveness family, health and occupational     ANDRIY Ferraro - CNP

## 2022-10-03 ENCOUNTER — TELEPHONE (OUTPATIENT)
Dept: PODIATRY | Facility: CLINIC | Age: 42
End: 2022-10-03

## 2022-10-04 ENCOUNTER — PATIENT ROUNDING (BHMG ONLY) (OUTPATIENT)
Dept: PODIATRY | Facility: CLINIC | Age: 42
End: 2022-10-04

## 2022-10-04 ENCOUNTER — OFFICE VISIT (OUTPATIENT)
Dept: PODIATRY | Facility: CLINIC | Age: 42
End: 2022-10-04

## 2022-10-04 VITALS
HEART RATE: 91 BPM | OXYGEN SATURATION: 98 % | HEIGHT: 62 IN | DIASTOLIC BLOOD PRESSURE: 82 MMHG | BODY MASS INDEX: 44.9 KG/M2 | WEIGHT: 244 LBS | SYSTOLIC BLOOD PRESSURE: 142 MMHG

## 2022-10-04 DIAGNOSIS — L60.0 INGROWN TOENAIL: ICD-10-CM

## 2022-10-04 DIAGNOSIS — M79.672 FOOT PAIN, BILATERAL: Primary | ICD-10-CM

## 2022-10-04 DIAGNOSIS — M79.671 FOOT PAIN, BILATERAL: Primary | ICD-10-CM

## 2022-10-04 PROCEDURE — 11720 DEBRIDE NAIL 1-5: CPT | Performed by: PODIATRIST

## 2022-10-04 PROCEDURE — 99203 OFFICE O/P NEW LOW 30 MIN: CPT | Performed by: PODIATRIST

## 2022-10-04 NOTE — PROGRESS NOTES
October 4, 2022    Hello, may I speak with Cathi Krishnan?    My name is Alaina      I am  with [facility] [place]  [address], [address]  [facility] [address] [phone].    Before we get started may I verify your date of birth? 1980    I am calling to officially welcome you to our practice and ask about your recent visit. Is this a good time to talk? Yes ma'am it is .    Tell me about your visit with us. What things went well?  My visit was fine.       We're always looking for ways to make our patients' experiences even better. Do you have recommendations on ways we may improve?  Well I do not think there is at the moment.    Overall were you satisfied with your first visit to our practice? Yes       I appreciate you taking the time to speak with me today. Is there anything else I can do for you? No I do not think there is .      Thank you, and have a great day.

## 2022-10-06 NOTE — TELEPHONE ENCOUNTER
Attempted to call report to S1, was placed on hold for over 5 minutes. Pt to be transported to the floor   Called pt to let her know that her script for duloxetine 30mg and 60mg was sent to her pharmacy     Was unable to lvm    Electronically signed by Lakeisha Ruelas on 1/14/2022 at 4:30 PM

## 2022-10-17 ENCOUNTER — TELEPHONE (OUTPATIENT)
Dept: VASCULAR SURGERY | Facility: CLINIC | Age: 42
End: 2022-10-17

## 2022-10-17 NOTE — TELEPHONE ENCOUNTER
Called to let pt know that she can walk into [facility] and have x-ray of foot any time.  Pt states she thinks she will need an appt to have her toenail removed since she is having pain since the toenail has grown out.  I asked the pt if she wants to schedule now and she states she'll wait till after she gets her x-ray done.

## 2022-10-26 ENCOUNTER — TELEPHONE (OUTPATIENT)
Dept: PODIATRY | Facility: CLINIC | Age: 42
End: 2022-10-26

## 2022-10-26 NOTE — TELEPHONE ENCOUNTER
Provider: DR NEVAREZ    Caller: MILKA EDWARDS    Relationship to Patient: SELF     Phone Number: [phone]    Reason for Call: X-RAY    When was the patient last seen: 10-4-22    PT STATED SHE DID NOT GET X-RAY ON LAST VISIT WHEN IT WAS ORDERED, AND WOULD LIKE A CALL BACK TO DISCUSS IF X-RAY ON RT FOOT COULD POSSIBLY BE DONE 11-1-22 WHEN SHE IS IN [place].  PT CURRENTLY HAS F/U APPT ON  1-3-23

## 2022-10-31 ENCOUNTER — TELEPHONE (OUTPATIENT)
Dept: PSYCHIATRY | Age: 42
End: 2022-10-31

## 2022-10-31 NOTE — TELEPHONE ENCOUNTER
Called and reschedule pt's appt for [email or web address] due to the provider (Jennifer ASHRAF) being out sick     Appt reschedule for [email or web address]    Electronically signed by Lakshmi Nixon on 10/31/2022 at 1:25 PM

## 2022-11-14 ENCOUNTER — OFFICE VISIT (OUTPATIENT)
Dept: PSYCHIATRY | Age: 42
End: 2022-11-14
Payer: COMMERCIAL

## 2022-11-14 VITALS
SYSTOLIC BLOOD PRESSURE: 130 MMHG | OXYGEN SATURATION: 94 % | WEIGHT: 241.5 LBS | HEIGHT: 62 IN | TEMPERATURE: 97.9 F | DIASTOLIC BLOOD PRESSURE: 81 MMHG | BODY MASS INDEX: 44.44 KG/M2 | HEART RATE: 80 BPM

## 2022-11-14 DIAGNOSIS — F33.0 MILD EPISODE OF RECURRENT MAJOR DEPRESSIVE DISORDER (HCC): Primary | ICD-10-CM

## 2022-11-14 DIAGNOSIS — G47.01 INSOMNIA DUE TO MEDICAL CONDITION: ICD-10-CM

## 2022-11-14 PROCEDURE — 99214 OFFICE O/P EST MOD 30 MIN: CPT | Performed by: NURSE PRACTITIONER

## 2022-11-14 ASSESSMENT — PATIENT HEALTH QUESTIONNAIRE - PHQ9
SUM OF ALL RESPONSES TO PHQ QUESTIONS 1-9: 4
9. THOUGHTS THAT YOU WOULD BE BETTER OFF DEAD, OR OF HURTING YOURSELF: 0
3. TROUBLE FALLING OR STAYING ASLEEP: 1
5. POOR APPETITE OR OVEREATING: 0
4. FEELING TIRED OR HAVING LITTLE ENERGY: 0
2. FEELING DOWN, DEPRESSED OR HOPELESS: 1
SUM OF ALL RESPONSES TO PHQ QUESTIONS 1-9: 4
10. IF YOU CHECKED OFF ANY PROBLEMS, HOW DIFFICULT HAVE THESE PROBLEMS MADE IT FOR YOU TO DO YOUR WORK, TAKE CARE OF THINGS AT HOME, OR GET ALONG WITH OTHER PEOPLE: 0
8. MOVING OR SPEAKING SO SLOWLY THAT OTHER PEOPLE COULD HAVE NOTICED. OR THE OPPOSITE, BEING SO FIGETY OR RESTLESS THAT YOU HAVE BEEN MOVING AROUND A LOT MORE THAN USUAL: 0
SUM OF ALL RESPONSES TO PHQ QUESTIONS 1-9: 4
1. LITTLE INTEREST OR PLEASURE IN DOING THINGS: 0
6. FEELING BAD ABOUT YOURSELF - OR THAT YOU ARE A FAILURE OR HAVE LET YOURSELF OR YOUR FAMILY DOWN: 0
SUM OF ALL RESPONSES TO PHQ9 QUESTIONS 1 & 2: 1
7. TROUBLE CONCENTRATING ON THINGS, SUCH AS READING THE NEWSPAPER OR WATCHING TELEVISION: 2
SUM OF ALL RESPONSES TO PHQ QUESTIONS 1-9: 4

## 2022-11-14 NOTE — PROGRESS NOTES
11/14/22         Progress Note    Colleen Mccall 1980      Chief Complaint   Patient presents with    Medication Check    Follow-up           Subjective:    Patient is a 43 y.o. female diagnosed with MDD and presents today for follow-up. Last seen in clinic on 5/27/22  and prior records were reviewed. Last visit: \"good\" anxiety and depression have well controlled. She reports home is going well. Racing thoughts before bed have subsided. She has been really involved in self care and coping strategies. occasionally takes naps during midday. She has been remitted of symptoms for several months she is asking to start coming off of medications at this time. We discussed decreasing Buspar to 7.5 mg daily but keeping cymbalta at 61. Will follow up in 4 months, if symptoms are still remitted we will discuss decreasing Cymbalta to 30 mg daily. Pt is agreeable to plan. She denies si hi avh, she denies side effects of medications. She is off work right now due to a knee surgery, she returns to work in July. Today:  she reports she is doing really well lately. She has decreased her Buspar to 5 mg daily and states it is working well. She is taking 60 mg of Cymbalta and states she is feeling good on that. She discussed with multiple people and she states she has been well controlled on medications. We discussed not tapering at this time since she is receiving a benefit of medications. She is agreeable to this plan. She is looking forward to spending time with her husbands family for thanksgiving. Her brother and father are somewhat reclusive and she does not think she is going to spend much time with them. She is back to work at this time. She is still having some knee complications, she states her arthritis is pretty severe. She has an mri scheduled in December. She denies si hi avh, she denies side effects of medications. She is well groomed and dressed appropriately for the weather.   Still has not followed up with neuro for sleep apnea management. Will follow up in 6 months. Absent  suicidal ideation. Reports compliance with medications as good . Sleep: not great. Goes to bed around 9 wakes up around 5 or so. Does not have a cpap, could not tolerate the mask. Encouraged to follow with neuro for sleep apnea.        Appetite: a little increased     Caffeine use:      Support:  ,    PREVIOUS MED TRIALS  BuSpar  Doxepin  Cymbalta      Current Substance Use:   Alcohol: Denies   illicit drug use: Denies   Marijuana: Denies   Tobacco use: Denies   Vape: Denies       BP: /81   Pulse 80   Temp 97.9 °F (36.6 °C)   Ht 5' 2\" (1.575 m)   Wt 241 lb 8 oz (109.5 kg)   SpO2 94%   BMI 44.17 kg/m²       Review of Systems - 14 point review:  Negative except for sleep apnea, back and knee pain,     Constitutional: (fevers, chills, night sweats, wt loss/gain, change in appetite, fatigue, somnolence)    HEENT: (ear pain or discharge, hearing loss, ear ringing, sinus pressure, nosebleed, nasal discharge, sore throat, oral sores, tooth pain, bleeding gums, hoarse voice, neck pain)      Cardiovascular: (HTN, chest pain, elevated cholesterol/lipids, palpitations, leg swelling, leg pain with walking)    Respiratory: (cough, wheezing, snoring, SOB with activity (dyspnea), SOB while lying flat (orthopnea), awakening with severe SOB (paroxysmal nocturnal dyspnea))    Gastrointestinal: (NVD, constipation, abdominal pain, bright red stools, black tarry stools, stool incontinence)     Genitourinary:  (pelvic pain, burning or frequency of urination, urinary urgency, blood in urine incomplete bladder emptying, urinary incontinence, STD; MEN: testicular pain or swelling, erectile dysfunction; WOMEN: LMP, heavy menstrual bleeding (menorrhagia), irregular periods, postmenopausal bleeding, menstrual pain (dymenorrhea, vaginal discharge)    Musculoskeletal: (bone pain/fracture, joint pain or swelling, musle pain)    Integumentary: (rashes, acne, non-healing sores, itching, breast lumps, breast pain, nipple discharge, hair loss)    Neurologic: (HA, muscle weakness, paresthesias (numbness, coldness, crawling or prickling), memory loss, seizure, dizziness)    Psychiatric:  (anxiety, sadness, irritability/anger, insomnia, suicidality)    Endocrine: (heat or cold intolerance, excessive thirst (polydipsia), excessive hunger (polyphagia))    Immune/Allergic: (hives, seasonal or environmental allergies, HIV exposure)    Hematologic/Lymphatic: (lymph node enlargement, easy bleeding or bruising)    History obtained via chart review and patient    PCP is Makenna Roper       Current Meds:    Prior to Admission medications    Medication Sig Start Date End Date Taking? Authorizing Provider   busPIRone (BUSPAR) 15 MG tablet Take 15 mg by mouth 3 times daily 9/8/22   Kevin Snellen, APRN - CNP   DULoxetine (CYMBALTA) 60 MG extended release capsule TAKE ONE CAPSULE BY MOUTH EVERY MORNING 9/8/22   Kevin Snellen, APRN - CNP   HYDROcodone-acetaminophen (NORCO) 5-325 MG per tablet Take 1 tablet by mouth 2 times daily.     Historical Provider, MD   pantoprazole (PROTONIX) 40 MG tablet Take 40 mg by mouth daily    Historical Provider, MD   ondansetron (ZOFRAN) 4 MG tablet Take 1 tablet by mouth every 8 hours as needed for Nausea or Vomiting  Patient not taking: Reported on 6/25/2021 1/17/19   Joanna Casarez MD   calcium-vitamin D (OSCAL) 250-125 MG-UNIT per tablet Take 1 tablet by mouth daily  Patient not taking: Reported on 6/25/2021    Historical Provider, MD   DOCUSATE SODIUM PO Take 100 mg by mouth daily  Patient not taking: Reported on 6/25/2021    Historical Provider, MD   vitamin D (ERGOCALCIFEROL) 43398 units CAPS capsule Take 5,000 Units by mouth once a week 6/19/18   Historical Provider, MD   ferrous sulfate 325 (65 Fe) MG tablet Take 325 mg by mouth daily    Historical Provider, MD     Social History     Socioeconomic History    Marital status:    Tobacco Use    Smoking status: Former     Types: Cigarettes     Quit date: 2009     Years since quittin.8    Smokeless tobacco: Never   Vaping Use    Vaping Use: Never used   Substance and Sexual Activity    Alcohol use: Never    Drug use: Never   Social History Narrative    SLEEP STUDY: Yes, home study, , uses a BPAP       MSE:  Appearance: Appropriately groomed. Made good eye contact. Gait stable. No abnormal movements or tremor. Behavior: Calm, cooperative, and socially appropriate. No psychomotor retardation/agitation appreciated. Speech: Normal in tone, volume, and quality. No slurring, dysarthria or pressured speech noted. Mood: \"great\"  Affect: Mood congruent. Thought Process: Appears linear, logical and goal oriented. Causality appears intact. Thought Content: Denies active suicidal and homicidal ideations. No overt delusions or paranoia appreciated. Perceptions: Denies auditory or visual hallucinations at present time. Not responding to internal stimuli. Concentration: Intact. Orientation: to person, place, date, and situation. Language: Intact. Fund of information: Intact. Memory: Recent and remote appear intact. Impulsivity: Limited. Neurovegitative: Fair appetite and sleep. Insight: Fair. Judgment: Fair. Cognition: Can spell \"world\" backwards:  Yes                    Can do serial 7's: Yes    No results found for: NA, K, CL, CO2, BUN, CREATININE, GLUCOSE, CALCIUM, PROT, LABALBU, BILITOT, ALKPHOS, AST, ALT, LABGLOM, GFRAA, AGRATIO, GLOB  No results found for: NA, K, CL, CO2, BUN, CREATININE, GLUCOSE, CALCIUM   No results found for: CHOL  No results found for: TRIG  No results found for: HDL  No results found for: LDLCHOLESTEROL, LDLCALC  No results found for: LABVLDL, VLDL  No results found for: CHOLHDLRATIO  No results found for: LABA1C  No results found for: EAG  No results found for: TSHFT4, TSH  No results found for: VITD25  No results found for: KDRVHDSA19   No results found for: FOLATE     Assessment:   1. Mild episode of recurrent major depressive disorder ([facility] [address]    2. Insomnia due to medical condition          No evidence of acute suicidality, homicidality or psychosis observed. Patient is psychiatrically stable    Plan:    1. Continue   Cymbalta, 60mg, daily in the morning  Buspirone, 5 mg,  time daily      (Follow up with restarting your BiPap)     Start     Discontinue      The risks, benefits, side effects, indications, contraindications, and adverse effects of the medications have been discussed. Yes.  2. The pt has verbalized understanding and has capacity to give informed consent. 3. The [place] report has been reviewed according to [facility] regulations. 4. Supportive therapy offered. 5. Follow up: Return in about 6 months (around 5/14/2023). 6. The patient has been advised to call with any problems. 7. Controlled substance Treatment Plan: NA.  8. The above listed medications have been continued, modifications in meds and other orders/labs as follows: No orders of the defined types were placed in this encounter. No orders of the defined types were placed in this encounter. 9. Additional comments: Continue therapy, discussed sleep hygiene, discussed the use of coping skills and relaxation strategies to manage symptoms. 10.Over 50% of the total visit time of   30  minutes was spent on counseling and/or coordination of care of:                        1. Mild episode of recurrent major depressive disorder ([facility]    2. Insomnia due to medical condition                        Psychotherapy Topics: mood/medication effectiveness family, health and occupational    Aubree Payne, APRN - CNP    This dictation was generated by voice recognition computer software. Although all attempts are made to edit the dictation for accuracy, there may be errors in the transcription that are not intended.

## 2022-11-15 ENCOUNTER — TELEPHONE (OUTPATIENT)
Dept: PSYCHIATRY | Age: 42
End: 2022-11-15

## 2022-11-15 NOTE — TELEPHONE ENCOUNTER
11/11/22:Called patient to remind them of their appointment on November 14 , 2022 at 10:00 AM with ANDRIY Correia. Patient didn't answer the phone and a voicemail was left with the patient's appointment date and time. Asked the patient to please called the office back at [phone] opt. 3 if they are unable to keep their appointment.

## 2022-12-01 ENCOUNTER — HOSPITAL ENCOUNTER (OUTPATIENT)
Dept: GENERAL RADIOLOGY | Facility: HOSPITAL | Age: 42
Discharge: HOME | End: 2022-12-01
Admitting: PODIATRIST

## 2022-12-01 DIAGNOSIS — M79.671 FOOT PAIN, BILATERAL: ICD-10-CM

## 2022-12-01 DIAGNOSIS — M79.672 FOOT PAIN, BILATERAL: ICD-10-CM

## 2022-12-01 PROCEDURE — 73630 X-RAY EXAM OF FOOT: CPT

## 2022-12-02 ENCOUNTER — TRANSCRIBE ORDERS (OUTPATIENT)
Dept: ADMINISTRATIVE | Facility: HOSPITAL | Age: 42
End: 2022-12-02

## 2022-12-02 DIAGNOSIS — K21.00 GASTRO-ESOPHAGEAL REFLUX DISEASE WITH ESOPHAGITIS, WITHOUT BLEEDING: ICD-10-CM

## 2022-12-02 DIAGNOSIS — D64.9 ANEMIA, UNSPECIFIED TYPE: Primary | ICD-10-CM

## 2022-12-02 DIAGNOSIS — Z01.812 ENCOUNTER FOR PREPROCEDURAL LABORATORY EXAMINATION: ICD-10-CM

## 2022-12-02 DIAGNOSIS — Z01.811 ENCOUNTER FOR PREPROCEDURAL RESPIRATORY EXAMINATION: ICD-10-CM

## 2022-12-02 DIAGNOSIS — M17.11 UNILATERAL PRIMARY OSTEOARTHRITIS, RIGHT KNEE: ICD-10-CM

## 2022-12-02 DIAGNOSIS — Z01.810 ENCOUNTER FOR PREPROCEDURAL CARDIOVASCULAR EXAMINATION: ICD-10-CM

## 2022-12-05 ENCOUNTER — LAB (OUTPATIENT)
Dept: LAB | Facility: HOSPITAL | Age: 42
End: 2022-12-05

## 2022-12-05 ENCOUNTER — HOSPITAL ENCOUNTER (OUTPATIENT)
Dept: GENERAL RADIOLOGY | Facility: HOSPITAL | Age: 42
Discharge: HOME | End: 2022-12-05

## 2022-12-05 ENCOUNTER — HOSPITAL ENCOUNTER (OUTPATIENT)
Dept: CARDIOLOGY | Facility: HOSPITAL | Age: 42
Discharge: HOME | End: 2022-12-05

## 2022-12-05 DIAGNOSIS — Z01.810 ENCOUNTER FOR PREPROCEDURAL CARDIOVASCULAR EXAMINATION: ICD-10-CM

## 2022-12-05 DIAGNOSIS — Z01.811 ENCOUNTER FOR PREPROCEDURAL RESPIRATORY EXAMINATION: ICD-10-CM

## 2022-12-05 DIAGNOSIS — D64.9 ANEMIA, UNSPECIFIED TYPE: ICD-10-CM

## 2022-12-05 DIAGNOSIS — M17.11 UNILATERAL PRIMARY OSTEOARTHRITIS, RIGHT KNEE: ICD-10-CM

## 2022-12-05 DIAGNOSIS — K21.00 GASTRO-ESOPHAGEAL REFLUX DISEASE WITH ESOPHAGITIS, WITHOUT BLEEDING: ICD-10-CM

## 2022-12-05 DIAGNOSIS — Z01.812 ENCOUNTER FOR PREPROCEDURAL LABORATORY EXAMINATION: ICD-10-CM

## 2022-12-05 LAB
ALBUMIN SERPL-MCNC: 3.9 G/DL (ref 3.5–5.2)
ALBUMIN/GLOB SERPL: 1.3 G/DL
ALP SERPL-CCNC: 91 U/L (ref 39–117)
ALT SERPL W P-5'-P-CCNC: 16 U/L (ref 1–33)
ANION GAP SERPL CALCULATED.3IONS-SCNC: 10 MMOL/L (ref 5–15)
APTT PPP: 30.6 SECONDS (ref 24.1–35)
AST SERPL-CCNC: 16 U/L (ref 1–32)
BACTERIA UR QL AUTO: ABNORMAL /HPF
BASOPHILS # BLD AUTO: 0.05 10*3/MM3 (ref 0–0.2)
BASOPHILS NFR BLD AUTO: 0.5 % (ref 0–1.5)
BILIRUB SERPL-MCNC: 0.3 MG/DL (ref 0–1.2)
BILIRUB UR QL STRIP: NEGATIVE
BUN SERPL-MCNC: 8 MG/DL (ref 6–20)
BUN/CREAT SERPL: 12.9 (ref 7–25)
CALCIUM SPEC-SCNC: 9.3 MG/DL (ref 8.6–10.5)
CHLORIDE SERPL-SCNC: 103 MMOL/L (ref 98–107)
CLARITY UR: ABNORMAL
CO2 SERPL-SCNC: 28 MMOL/L (ref 22–29)
COLOR UR: YELLOW
CREAT SERPL-MCNC: 0.62 MG/DL (ref 0.57–1)
DEPRECATED RDW RBC AUTO: 38.5 FL (ref 37–54)
EGFRCR SERPLBLD CKD-EPI 2021: 114.2 ML/MIN/1.73
EOSINOPHIL # BLD AUTO: 0.18 10*3/MM3 (ref 0–0.4)
EOSINOPHIL NFR BLD AUTO: 1.9 % (ref 0.3–6.2)
ERYTHROCYTE [DISTWIDTH] IN BLOOD BY AUTOMATED COUNT: 13 % (ref 12.3–15.4)
GLOBULIN UR ELPH-MCNC: 3 GM/DL
GLUCOSE SERPL-MCNC: 83 MG/DL (ref 65–99)
GLUCOSE UR STRIP-MCNC: NEGATIVE MG/DL
HCT VFR BLD AUTO: 42.1 % (ref 34–46.6)
HGB BLD-MCNC: 13.5 G/DL (ref 12–15.9)
HGB UR QL STRIP.AUTO: ABNORMAL
HYALINE CASTS UR QL AUTO: ABNORMAL /LPF
IMM GRANULOCYTES # BLD AUTO: 0.05 10*3/MM3 (ref 0–0.05)
IMM GRANULOCYTES NFR BLD AUTO: 0.5 % (ref 0–0.5)
INR PPP: 0.99 (ref 0.91–1.09)
KETONES UR QL STRIP: NEGATIVE
LEUKOCYTE ESTERASE UR QL STRIP.AUTO: ABNORMAL
LYMPHOCYTES # BLD AUTO: 2.19 10*3/MM3 (ref 0.7–3.1)
LYMPHOCYTES NFR BLD AUTO: 22.6 % (ref 19.6–45.3)
MCH RBC QN AUTO: 26.3 PG (ref 26.6–33)
MCHC RBC AUTO-ENTMCNC: 32.1 G/DL (ref 31.5–35.7)
MCV RBC AUTO: 82.1 FL (ref 79–97)
MONOCYTES # BLD AUTO: 0.52 10*3/MM3 (ref 0.1–0.9)
MONOCYTES NFR BLD AUTO: 5.4 % (ref 5–12)
NEUTROPHILS NFR BLD AUTO: 6.68 10*3/MM3 (ref 1.7–7)
NEUTROPHILS NFR BLD AUTO: 69.1 % (ref 42.7–76)
NITRITE UR QL STRIP: NEGATIVE
NRBC BLD AUTO-RTO: 0 /100 WBC (ref 0–0.2)
PH UR STRIP.AUTO: 6 [PH] (ref 5–8)
PLATELET # BLD AUTO: 366 10*3/MM3 (ref 140–450)
PMV BLD AUTO: 9.8 FL (ref 6–12)
POTASSIUM SERPL-SCNC: 4.7 MMOL/L (ref 3.5–5.2)
PROT SERPL-MCNC: 6.9 G/DL (ref 6–8.5)
PROT UR QL STRIP: NEGATIVE
PROTHROMBIN TIME: 13.2 SECONDS (ref 11.8–14.8)
RBC # BLD AUTO: 5.13 10*6/MM3 (ref 3.77–5.28)
RBC # UR STRIP: ABNORMAL /HPF
REF LAB TEST METHOD: ABNORMAL
SODIUM SERPL-SCNC: 141 MMOL/L (ref 136–145)
SP GR UR STRIP: 1.02 (ref 1–1.03)
SQUAMOUS #/AREA URNS HPF: ABNORMAL /HPF
UROBILINOGEN UR QL STRIP: ABNORMAL
WBC # UR STRIP: ABNORMAL /HPF
WBC NRBC COR # BLD: 9.67 10*3/MM3 (ref 3.4–10.8)

## 2022-12-05 PROCEDURE — 85025 COMPLETE CBC W/AUTO DIFF WBC: CPT

## 2022-12-05 PROCEDURE — 36415 COLL VENOUS BLD VENIPUNCTURE: CPT

## 2022-12-05 PROCEDURE — 87081 CULTURE SCREEN ONLY: CPT

## 2022-12-05 PROCEDURE — 85610 PROTHROMBIN TIME: CPT

## 2022-12-05 PROCEDURE — 93005 ELECTROCARDIOGRAM TRACING: CPT | Performed by: PHYSICIAN ASSISTANT

## 2022-12-05 PROCEDURE — 80053 COMPREHEN METABOLIC PANEL: CPT

## 2022-12-05 PROCEDURE — 93010 ELECTROCARDIOGRAM REPORT: CPT | Performed by: INTERNAL MEDICINE

## 2022-12-05 PROCEDURE — 85730 THROMBOPLASTIN TIME PARTIAL: CPT

## 2022-12-05 PROCEDURE — 81001 URINALYSIS AUTO W/SCOPE: CPT

## 2022-12-05 PROCEDURE — 87086 URINE CULTURE/COLONY COUNT: CPT

## 2022-12-05 PROCEDURE — 71046 X-RAY EXAM CHEST 2 VIEWS: CPT

## 2022-12-06 LAB
MRSA SPEC QL CULT: NORMAL
QT INTERVAL: 388 MS
QTC INTERVAL: 433 MS

## 2022-12-07 LAB — BACTERIA SPEC AEROBE CULT: NORMAL

## 2022-12-14 ENCOUNTER — TELEPHONE (OUTPATIENT)
Dept: PODIATRY | Facility: CLINIC | Age: 42
End: 2022-12-14

## 2022-12-14 NOTE — TELEPHONE ENCOUNTER
Left voice mail regarding xray results stating that   RIGHT FOOT:  No fracture is seen. There is minimal narrowing of the  first MTP joint. There is minimal bunion formation along the distal  medial first metatarsal. There is a moderate-sized plantar calcaneal  spur. There is posterior calcaneal enthesopathy. There is pes planus on  the lateral image.     LEFT FOOT: No fracture is seen. There is minimal narrowing of the first  MTP joint. There is minimal bunion formation along the distal medial  first metatarsal. There is a small to moderate plantar calcaneal spur.  There is pes planus on the lateral image.

## 2023-01-17 NOTE — TELEPHONE ENCOUNTER
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 Patient was advised that she does not have any malignancy in her stomach tissue. She does have chronic active gastritis in which she may resume protonix once daily as previously prescribed. She voiced understanding and has no further questions.   
room air

## 2023-02-22 ENCOUNTER — TELEPHONE (OUTPATIENT)
Dept: PODIATRY | Facility: CLINIC | Age: 43
End: 2023-02-22

## 2023-02-22 NOTE — TELEPHONE ENCOUNTER
Provider: GERI  Caller: MILKA  Relationship to Patient: SELF  Pharmacy:   Phone Number: [phone]  Reason for Call: PT CALLING TO SEE IF HER APPT IN April IS TO REMOVE THE TOENAILS

## 2023-02-26 DIAGNOSIS — F33.0 MILD EPISODE OF RECURRENT MAJOR DEPRESSIVE DISORDER (HCC): ICD-10-CM

## 2023-02-27 RX ORDER — DULOXETIN HYDROCHLORIDE 60 MG/1
CAPSULE, DELAYED RELEASE ORAL
Qty: 90 CAPSULE | Refills: 0 | Status: SHIPPED | OUTPATIENT
Start: 2023-02-27

## 2023-02-27 NOTE — TELEPHONE ENCOUNTER
VM left to inform pt that refill RX (Cymbalta) ahd been sent to her pharmacy per Rasta Dent APRN-pt encouraged to call back with any questions.

## 2023-02-27 NOTE — TELEPHONE ENCOUNTER
Pharmacy sent med refill request below. Last office visit : 11/14/2022 with Bryon ASHRAF  Next office visit : 5/15/2023 with Bryon ASHRAF    Requested Prescriptions     Pending Prescriptions Disp Refills    DULoxetine (CYMBALTA) 60 MG extended release capsule [Pharmacy Med Name: DULOXETINE HCL 60MG CPEP] 90 capsule 0     Sig: TAKE ONE CAPSULE BY MOUTH EVERY MORNING            Jerome Orosco RN     11/14/22                                          Progress Note     Sheilah Schaumann 1980                           Chief Complaint   Patient presents with    Medication Check    Follow-up               Subjective:    Patient is a 43 y.o. female diagnosed with MDD and presents today for follow-up. Last seen in clinic on 5/27/22  and prior records were reviewed. Last visit: \"good\" anxiety and depression have well controlled. She reports home is going well. Racing thoughts before bed have subsided. She has been really involved in self care and coping strategies. occasionally takes naps during midday. She has been remitted of symptoms for several months she is asking to start coming off of medications at this time. We discussed decreasing Buspar to 7.5 mg daily but keeping cymbalta at 61. Will follow up in 4 months, if symptoms are still remitted we will discuss decreasing Cymbalta to 30 mg daily. Pt is agreeable to plan. She denies si hi avh, she denies side effects of medications. She is off work right now due to a knee surgery, she returns to work in July. Today:  she reports she is doing really well lately. She has decreased her Buspar to 5 mg daily and states it is working well. She is taking 60 mg of Cymbalta and states she is feeling good on that. She discussed with multiple people and she states she has been well controlled on medications. We discussed not tapering at this time since she is receiving a benefit of medications. She is agreeable to this plan.   She is looking forward to spending time with her husbands family for thanksgiving. Her brother and father are somewhat reclusive and she does not think she is going to spend much time with them. She is back to work at this time. She is still having some knee complications, she states her arthritis is pretty severe. She has an mri scheduled in December. She denies si hi avh, she denies side effects of medications. She is well groomed and dressed appropriately for the weather. Still has not followed up with neuro for sleep apnea management. Will follow up in 6 months. Absent  suicidal ideation. Reports compliance with medications as good . Sleep: not great. Goes to bed around 9 wakes up around 5 or so. Does not have a cpap, could not tolerate the mask. Encouraged to follow with neuro for sleep apnea.         Appetite: a little increased     Caffeine use:      Support:  ,     PREVIOUS MED TRIALS  BuSpar  Doxepin  Cymbalta        Current Substance Use:   Alcohol: Denies   illicit drug use: Denies   Marijuana: Denies   Tobacco use: Denies   Vape: Denies         BP: /81   Pulse 80   Temp 97.9 °F (36.6 °C)   Ht 5' 2\" (1.575 m)   Wt 241 lb 8 oz (109.5 kg)   SpO2 94%   BMI 44.17 kg/m²         Review of Systems - 14 point review:  Negative except for sleep apnea, back and knee pain,      Constitutional: (fevers, chills, night sweats, wt loss/gain, change in appetite, fatigue, somnolence)     HEENT: (ear pain or discharge, hearing loss, ear ringing, sinus pressure, nosebleed, nasal discharge, sore throat, oral sores, tooth pain, bleeding gums, hoarse voice, neck pain)      Cardiovascular: (HTN, chest pain, elevated cholesterol/lipids, palpitations, leg swelling, leg pain with walking)     Respiratory: (cough, wheezing, snoring, SOB with activity (dyspnea), SOB while lying flat (orthopnea), awakening with severe SOB (paroxysmal nocturnal dyspnea))     Gastrointestinal: (NVD, constipation, abdominal pain, bright red stools, black tarry stools, stool incontinence)     Genitourinary:  (pelvic pain, burning or frequency of urination, urinary urgency, blood in urine incomplete bladder emptying, urinary incontinence, STD; MEN: testicular pain or swelling, erectile dysfunction; WOMEN: LMP, heavy menstrual bleeding (menorrhagia), irregular periods, postmenopausal bleeding, menstrual pain (dymenorrhea, vaginal discharge)     Musculoskeletal: (bone pain/fracture, joint pain or swelling, musle pain)     Integumentary: (rashes, acne, non-healing sores, itching, breast lumps, breast pain, nipple discharge, hair loss)     Neurologic: (HA, muscle weakness, paresthesias (numbness, coldness, crawling or prickling), memory loss, seizure, dizziness)     Psychiatric:  (anxiety, sadness, irritability/anger, insomnia, suicidality)     Endocrine: (heat or cold intolerance, excessive thirst (polydipsia), excessive hunger (polyphagia))     Immune/Allergic: (hives, seasonal or environmental allergies, HIV exposure)     Hematologic/Lymphatic: (lymph node enlargement, easy bleeding or bruising)     History obtained via chart review and patient     PCP is Mame Wyman         Current Meds:     Home Medications           Prior to Admission medications    Medication Sig Start Date End Date Taking? Authorizing Provider   busPIRone (BUSPAR) 15 MG tablet Take 15 mg by mouth 3 times daily 9/8/22     ANDRIY Benito CNP   DULoxetine (CYMBALTA) 60 MG extended release capsule TAKE ONE CAPSULE BY MOUTH EVERY MORNING 9/8/22     ANDRIY Benito CNP   HYDROcodone-acetaminophen (NORCO) 5-325 MG per tablet Take 1 tablet by mouth 2 times daily.        Historical Provider, MD   pantoprazole (PROTONIX) 40 MG tablet Take 40 mg by mouth daily       Historical Provider, MD   ondansetron (ZOFRAN) 4 MG tablet Take 1 tablet by mouth every 8 hours as needed for Nausea or Vomiting  Patient not taking: Reported on 6/25/2021 1/17/19     Farrah Kim MD Mi   calcium-vitamin D (OSCAL) 250-125 MG-UNIT per tablet Take 1 tablet by mouth daily  Patient not taking: Reported on 2021       Historical Provider, MD   DOCUSATE SODIUM PO Take 100 mg by mouth daily  Patient not taking: Reported on 2021       Historical Provider, MD   vitamin D (ERGOCALCIFEROL) 25088 units CAPS capsule Take 5,000 Units by mouth once a week 18     Historical Provider, MD   ferrous sulfate 325 (65 Fe) MG tablet Take 325 mg by mouth daily       Historical Provider, MD         Social History   Social History            Socioeconomic History    Marital status:    Tobacco Use    Smoking status: Former       Types: Cigarettes       Quit date:        Years since quittin.8    Smokeless tobacco: Never   Vaping Use    Vaping Use: Never used   Substance and Sexual Activity    Alcohol use: Never    Drug use: Never   Social History Narrative     SLEEP STUDY: Yes, home study, , uses a BPAP            MSE:  Appearance: Appropriately groomed. Made good eye contact. Gait stable. No abnormal movements or tremor. Behavior: Calm, cooperative, and socially appropriate. No psychomotor retardation/agitation appreciated. Speech: Normal in tone, volume, and quality. No slurring, dysarthria or pressured speech noted. Mood: \"great\"  Affect: Mood congruent. Thought Process: Appears linear, logical and goal oriented. Causality appears intact. Thought Content: Denies active suicidal and homicidal ideations. No overt delusions or paranoia appreciated. Perceptions: Denies auditory or visual hallucinations at present time. Not responding to internal stimuli. Concentration: Intact. Orientation: to person, place, date, and situation. Language: Intact. Fund of information: Intact. Memory: Recent and remote appear intact. Impulsivity: Limited. Neurovegitative: Fair appetite and sleep. Insight: Fair. Judgment: Fair.      Cognition: Can spell \"world\" backwards: Yes                    Can do serial 7's: Yes     No results found for: NA, K, CL, CO2, BUN, CREATININE, GLUCOSE, CALCIUM, PROT, LABALBU, BILITOT, ALKPHOS, AST, ALT, LABGLOM, GFRAA, AGRATIO, GLOB  No results found for: NA, K, CL, CO2, BUN, CREATININE, GLUCOSE, CALCIUM   No results found for: CHOL  No results found for: TRIG  No results found for: HDL  No results found for: LDLCHOLESTEROL, LDLCALC  No results found for: LABVLDL, VLDL  No results found for: CHOLHDLRATIO  No results found for: LABA1C  No results found for: EAG  No results found for: TSHFT4, TSH  No results found for: VITD25  No results found for: NWCSUTAM22   No results found for: FOLATE      Assessment:    1. Mild episode of recurrent major depressive disorder ([facility] [address]    2. Insomnia due to medical condition             No evidence of acute suicidality, homicidality or psychosis observed. Patient is psychiatrically stable     Plan:     1. Continue   Cymbalta, 60mg, daily in the morning  Buspirone, 5 mg,  time daily      (Follow up with restarting your BiPap)      Start      Discontinue        The risks, benefits, side effects, indications, contraindications, and adverse effects of the medications have been discussed. Yes.  2. The pt has verbalized understanding and has capacity to give informed consent. 3. The [facility] report has been reviewed according to [facility] regulations. 4. Supportive therapy offered. 5. Follow up:    Return in about 6 months (around 5/14/2023). 6. The patient has been advised to call with any problems. 7. Controlled substance Treatment Plan: NA.  8. The above listed medications have been continued, modifications in meds and other orders/labs as follows:                 Encounter Medications    No orders of the defined types were placed in this encounter. No orders of the defined types were placed in this encounter.         9. Additional comments: Continue therapy, discussed sleep hygiene, discussed the use of coping skills and relaxation strategies to manage symptoms. 10.Over 50% of the total visit time of   30  minutes was spent on counseling and/or coordination of care of:                         1. Mild episode of recurrent major depressive disorder ([facility] [address]    2.  Insomnia due to medical condition                           Psychotherapy Topics: mood/medication effectiveness family, health and occupational     Saeed Herrera, APRN - CNP

## 2023-03-28 NOTE — PROGRESS NOTES
"    [facility] - PODIATRY    Today's Date: 04/14/2023     Patient Name: Cathi Krishnan  MRN: [id number]  [facility]: [id number]  PCP: Dionisio Knight MD  Referring Provider: No ref. provider found    SUBJECTIVE     Chief Complaint   Patient presents with   • Follow-up     dionisio damian F/U BILATERAL INGROWN GREAT TOENAILS.-pt states she is here today for AZEB ingrown nails on great toe both feet/ pt had imaging done in [facility]-pt reports 1/10 pain as of now     HPI: Cathi Krishnan, a 42 y.o.female, comes to clinic as a(n) established patient complaining of ingrown toenail and complaining of painful toenails. Patient has h/o anxiety, arthritis, depression, GERD, RA, Varicose veins. Patient presents with complaints of recurrent IGTN of both great toes. States that she has been having pain in the nails for the last couple of months. States she is interested in having nail avulsion procedure performed today. Admits pain at 10/10 level and described as aching, pressure and throbbing. Relates previous treatment(s) including slant back. Has had x-rays that were previously ordered completed. Denies any constitutional symptoms. No other pedal complaints at this time.    Past Medical History:   Diagnosis Date   • Ankle swelling    • Anxiety    • Arthritis    • Back pain    • Constipation    • Depression    • Excessive thirst    • GERD (gastroesophageal reflux disease)    • Heartburn    • History of attempted suicide     age 13 years old    • Leg cramps     with walking   • Loss of bladder control leaking urine   • Pain     Shoulder, neck, knee,back     • Rash     in skin folds    • Rheumatoid arthritis     \"starting\" was mild case per Dr. Pelaez   • Varicose veins of both lower extremities    • Wears glasses      Past Surgical History:   Procedure Laterality Date   • CHOLECYSTECTOMY  1999    lap   • ENDOSCOPY     • ENDOSCOPY N/A 07/31/2018    Procedure: ESOPHAGOGASTRODUODENOSCOPY WITH ANESTHESIA;  " "Surgeon: Julio C Rodriguez MD;  Location: [facility] ENDOSCOPY;  Service: General   • ESSURE TUBAL LIGATION       & 2017    • GASTRIC SLEEVE LAPAROSCOPIC N/A 2018    Procedure: GASTRIC SLEEVE LAPAROSCOPIC;  Surgeon: Julio C Rodriguez MD;  Location: [facility] OR;  Service: Bariatric   • HERNIA REPAIR Left     inguinal; without mesh    • TEETH EXTRACTION     • TONSILLECTOMY     • TOTAL KNEE ARTHROPLASTY Right 2022     Family History   Problem Relation Age of Onset   • Hypertension Father    • Breast cancer Mother    • Ovarian cancer Mother    • Hypertension Mother    • Diabetes Mother    • Stroke Mother    • Coronary artery disease Mother    • Deep vein thrombosis Mother    • Obesity Mother    • Heart disease Mother    • Sleep apnea Mother    • Lung cancer Paternal Grandfather    • Hypertension Brother    • Obesity Brother    • Diabetes Brother    • Heart attack Brother    • Heart disease Brother    • Heart attack Maternal Grandmother    • Obesity Maternal Grandmother    • Obesity Brother      Social History     Socioeconomic History   • Marital status:    Tobacco Use   • Smoking status: Former     Packs/day: 1.00     Years: 10.00     Pack years: 10.00     Types: Cigarettes     Quit date:      Years since quittin.2     Passive exposure: Past   • Smokeless tobacco: Never   Vaping Use   • Vaping Use: Never used   Substance and Sexual Activity   • Alcohol use: No   • Drug use: No   • Sexual activity: Yes     Partners: Male     Birth control/protection: Essure     Allergies   Allergen Reactions   • Asa [Aspirin] Shortness Of Breath and Swelling   • Cortisone Shortness Of Breath and Swelling   • Penicillins Hives and Shortness Of Breath     \"All Cillins\"   • Prednisone Shortness Of Breath and Swelling   • Zantac [Ranitidine Hcl] Shortness Of Breath and Swelling   • Ibuprofen Hives   • Nsaids Unknown (See Comments)     Bariatric Surgery     Current Outpatient Medications   Medication Sig Dispense " Refill   • BIOTIN PO Take  by mouth.     • busPIRone (BUSPAR) 15 MG tablet Take 1 tablet by mouth 3 (Three) Times a Day.     • calcium citrate-vitamin d (CALCITRATE) 315-250 MG-UNIT tablet tablet Take  by mouth Daily.     • Cyanocobalamin (VITAMIN B 12 PO) Take  by mouth.     • cyclobenzaprine (FLEXERIL) 10 MG tablet      • docusate sodium (COLACE) 100 MG capsule Take 90 mg by mouth Daily.     • DULoxetine (CYMBALTA) 60 MG capsule Take 1 capsule by mouth 2 (Two) Times a Day.     • ferrous sulfate 325 (65 FE) MG tablet Take 1 tablet by mouth Daily With Breakfast.     • HYDROcodone-acetaminophen (NORCO) 5-325 MG per tablet Take 1 tablet by mouth Every 6 (Six) Hours As Needed.     • Multiple Vitamins-Minerals (MULTIVITAMIN ADULT PO) Take  by mouth.     • pantoprazole (PROTONIX) 40 MG EC tablet Take 1 tablet by mouth Daily. 30 tablet 1   • propranolol (INDERAL) 20 MG tablet Take 1 tablet by mouth 3 (Three) Times a Day As Needed (panic and anxiety).     • pseudoephedrine (SUDAFED) 30 MG tablet Take 1 tablet by mouth Daily As Needed for Congestion.     • simethicone (GAS-X) 80 MG chewable tablet Chew 0.5 tablets Every 6 (Six) Hours As Needed for Flatulence (belching). 30 tablet 1   • vitamin D (ERGOCALCIFEROL) 10865 UNITS capsule capsule Take 1 capsule by mouth Every 7 (Seven) Days. Tuesday       No current facility-administered medications for this visit.     Review of Systems   Constitutional: Negative for chills and fever.   HENT: Negative for congestion.    Respiratory: Negative for shortness of breath.    Cardiovascular: Negative for chest pain and leg swelling.   Gastrointestinal: Negative for constipation, diarrhea, nausea and vomiting.   Musculoskeletal: Positive for arthralgias.        Foot pain   Skin: Negative for wound.        IGTN   Neurological: Negative for numbness.       OBJECTIVE     Vitals:    04/14/23 1043   BP: 130/86   Pulse: 94   SpO2: 97%       PHYSICAL EXAM  GEN:   Accompanied by s/o.      Foot/Ankle Exam:     GENERAL  Appearance:  obese  Orientation:  AAOx3  Affect:  appropriate  Gait:  unimpaired  Assistance:  independent  Right shoe gear: sandal  Left shoe gear: sandal    VASCULAR      Right Foot Vascularity   Dorsalis pedis:  2+  Posterior tibial:  2+  Skin Temperature: warm    Edema Grading:  None  CFT:  3  Pedal Hair Growth:  Present  Varicosities: none       Left Foot Vascularity   Dorsalis pedis:  2+  Posterior tibial:  2+  Skin Temperature: warm    Edema Grading:  None  CFT:  3  Pedal Hair Growth:  Present  Varicosities: none       NEUROLOGIC     Right Foot Neurologic   Normal sensation    Light touch sensation: normal  Vibratory sensation: normal  Hot/Cold sensation: normal  Protective Sensation using Holstein-Nereida Monofilament:  10  Sites intact: 10  Sites tested: 10     Left Foot Neurologic   Normal sensation    Light touch sensation: normal  Vibratory sensation: normal  Hot/Cold sensation:  normal  Protective Sensation using Holstein-Nereida Monofilament:  10  Sites intact: 10  Sites tested: 10    MUSCULOSKELETAL     Right Foot Musculoskeletal   Ecchymosis:  none  Tenderness:  toe 1 tenderness and toenail problem    Arch:  Normal  Mallet toe:  Second toe     Left Foot Musculoskeletal   Ecchymosis:  none  Tenderness:  toe 1 tenderness and toenail problem  Arch:  Normal     MUSCLE STRENGTH     Right Foot Muscle Strength   Foot dorsiflexion:  5  Foot plantar flexion:  5  Foot inversion:  5  Foot eversion:  5     Left Foot Muscle Strength   Foot dorsiflexion:  5  Foot plantar flexion:  5  Foot inversion:  5  Foot eversion:  5     RANGE OF MOTION      Right Foot Range of Motion   Foot and ankle ROM within normal limits       Left Foot Range of Motion   Foot and ankle ROM within normal limits      DERMATOLOGIC      Right Foot Dermatologic   Skin  Right foot skin is intact.   Nails  1.  Positive for dystrophic nail and ingrown toenail.  2.  Normal.  3.  Normal.  4.  Normal.  5.   Normal.  Nails comment:  Hallux - pincer nail     Left Foot Dermatologic   Skin  Left foot skin is intact.   Nails comment:  Hallux - pincer nail  Nails  1.  Positive for dystrophic nail and ingrown toenail.  2.  Normal.  3.  Normal.  4.  Normal.      RADIOLOGY/NUCLEAR:  No results found.    LABORATORY/CULTURE RESULTS:      PATHOLOGY RESULTS:       ASSESSMENT/PLAN     Diagnoses and all orders for this visit:    1. Ingrown toenail (Primary)  -     bupivacaine (MARCAINE) 0.5 % injection 5 mL  -     bupivacaine (MARCAINE) 0.5 % injection 5 mL  -     Nail Removal  -     Nail Removal    2. Foot pain, bilateral  -     bupivacaine (MARCAINE) 0.5 % injection 5 mL  -     bupivacaine (MARCAINE) 0.5 % injection 5 mL      Comprehensive lower extremity examination and evaluation was performed.  Discussed findings and treatment plan including risks, benefits, and treatment options with patient in detail. Patient agreed with treatment plan.  Patient interested in moving forward with total nail avulsion of both hallux nails at this time.  After written consent obtained, total/partial nail avulsion(s) performed as documented in procedure note. Post-procedure instructions given.    Xrays reviewed with patient.    An After Visit Summary was printed and given to the patient at discharge, including (if requested) any available informative/educational handouts regarding diagnosis, treatment, or medications. All questions were answered to patient/family satisfaction. Should symptoms fail to improve or worsen they agree to call or return to clinic or to go to the Emergency Department. Discussed the importance of following up with any needed screening tests/labs/specialist appointments and any requested follow-up recommended by me today. Importance of maintaining follow-up discussed and patient accepts that missed appointments can delay diagnosis and potentially lead to worsening of conditions.  Return in about 2 weeks (around 4/28/2023)  for Recheck., or sooner if acute issues arise.    Nail Removal    Date/Time: 4/14/2023 11:46 AM  Performed by: Ga Miguel DPM  Authorized by: Ga Miguel DPM   Location: left foot  Location details: left big toe  Anesthesia: digital block    Anesthesia:  Local Anesthetic: bupivacaine 0.5% without epinephrine  Anesthetic total: 5 mL    Sedation:  Patient sedated: no    Preparation: skin prepped with Betadine  Amount removed: complete  Wedge excision of skin of nail fold: no  Nail bed sutured: no  Nail matrix removed: complete (phenol)  Removed nail replaced and anchored: no  Dressing: 4x4, antibiotic ointment and gauze roll  Patient tolerance: patient tolerated the procedure well with no immediate complications  Comments: Flushed with alcohol. Silvadene applied.    Nail Removal    Date/Time: 4/14/2023 11:46 AM  Performed by: Ga Miguel DPM  Authorized by: Ga Miguel DPM   Location: right foot  Location details: right big toe  Anesthesia: digital block    Anesthesia:  Local Anesthetic: bupivacaine 0.5% without epinephrine  Anesthetic total: 5 mL    Sedation:  Patient sedated: no    Preparation: skin prepped with Betadine  Amount removed: complete  Wedge excision of skin of nail fold: no  Nail bed sutured: no  Nail matrix removed: complete (phenol)  Removed nail replaced and anchored: no  Dressing: 4x4, gauze roll and antibiotic ointment  Patient tolerance: patient tolerated the procedure well with no immediate complications  Comments: Flushed with alcohol. Silvadene applied.             Lab Frequency Next Occurrence   Follow Anesthesia Guidelines / Standing Orders Once 06/21/2018   Follow Anesthesia Guidelines / Standing Orders Once 10/29/2018   Obtain Informed Consent Once 11/03/2018       This document has been electronically signed by Ga Miguel DPM on April 14, 2023 11:47 CDT

## 2023-04-13 ENCOUNTER — TELEPHONE (OUTPATIENT)
Dept: PODIATRY | Facility: CLINIC | Age: 43
End: 2023-04-13
Payer: COMMERCIAL

## 2023-04-14 ENCOUNTER — OFFICE VISIT (OUTPATIENT)
Dept: PODIATRY | Facility: CLINIC | Age: 43
End: 2023-04-14
Payer: COMMERCIAL

## 2023-04-14 VITALS
BODY MASS INDEX: 44.16 KG/M2 | WEIGHT: 240 LBS | HEIGHT: 62 IN | OXYGEN SATURATION: 97 % | DIASTOLIC BLOOD PRESSURE: 86 MMHG | SYSTOLIC BLOOD PRESSURE: 130 MMHG | HEART RATE: 94 BPM

## 2023-04-14 DIAGNOSIS — M79.672 FOOT PAIN, BILATERAL: ICD-10-CM

## 2023-04-14 DIAGNOSIS — L60.0 INGROWN TOENAIL: Primary | ICD-10-CM

## 2023-04-14 DIAGNOSIS — M79.671 FOOT PAIN, BILATERAL: ICD-10-CM

## 2023-04-14 PROCEDURE — 11750 EXCISION NAIL&NAIL MATRIX: CPT | Performed by: PODIATRIST

## 2023-04-14 RX ORDER — CYCLOBENZAPRINE HCL 10 MG
TABLET ORAL
COMMUNITY
Start: 2023-02-15

## 2023-04-14 RX ORDER — BUPIVACAINE HYDROCHLORIDE 5 MG/ML
5 INJECTION, SOLUTION PERINEURAL ONCE
Status: COMPLETED | OUTPATIENT
Start: 2023-04-14 | End: 2023-04-14

## 2023-04-14 RX ADMIN — BUPIVACAINE HYDROCHLORIDE 5 ML: 5 INJECTION, SOLUTION PERINEURAL at 11:22

## 2023-04-14 NOTE — PATIENT INSTRUCTIONS
Fingernail or Toenail Removal, Adult  Fingernail or toenail removal is a procedure in which a health care provider removes a person's nail. This may be done because of an injury, accident, or medical condition. Removal of a nail may be necessary when the nail is ingrown, infected, or damaged, or when it has not grown properly.  Tell a health care provider about:  Any allergies you have.  All medicines you are taking, including vitamins, herbs, eye drops, creams, and over-the-counter medicines.  Any problems you or family members have had with anesthetic medicines.  Any blood disorders you have.  Any surgeries you have had.  Any medical conditions you have.  Whether you are pregnant or may be pregnant.  What are the risks?  Generally, this is a safe procedure. However, problems may occur, including:  Pain.  Bleeding.  Infection.  Allergic reactions to medicines.  Damage to nearby structures.  The nail growing back improperly.  What happens before the procedure?  Medicines  Ask your health care provider about:  Changing or stopping your regular medicines. This is especially important if you are taking diabetes medicines or blood thinners.  Taking medicines such as aspirin and ibuprofen. These medicines can thin your blood. Do not take these medicines unless your health care provider tells you to take them.  Taking over-the-counter medicines, vitamins, herbs, and supplements.  General instructions  Ask your health care provider what steps will be taken to help prevent infection. These may include:  Removing hair at the surgery site.  Washing skin with a germ-killing soap.  Receiving antibiotic medicine.  What happens during the procedure?  You will be given a medicine to numb the area (local anesthetic).  An instrument will be inserted underneath the nail to lift it up.  An incision may be made in your nail.  The nail will be removed.  A bandage (dressing) will be put over the area where the nail was removed.  The  procedure may vary among health care providers and hospitals.  What happens after the procedure?  Your blood pressure, heart rate, breathing rate, and blood oxygen level will be monitored until you leave the hospital or clinic.  If you had a fingernail removed, you may be given a finger splint to wear while you recover.  If you had a toenail removed, you may be given a surgical shoe to wear while you recover.  You may need to keep your hand or foot raised (elevated) or supported on a pillow for 24 hours or as long as told by your health care provider.  Summary  Removal of a nail may be necessary when the nail is ingrown, infected, or damaged, or when it has not grown properly.  Before the procedure, tell your health care provider about all medicines you take and any medical conditions you have.  You will be given medicine to numb the area, and the nail will be removed.  After a fingernail is removed, you may be given a finger splint to wear while you recover.  After a toenail is removed, you may be given a surgical shoe to wear while you recover.  This information is not intended to replace advice given to you by your health care provider. Make sure you discuss any questions you have with your health care provider.  Document Revised: 08/10/2020 Document Reviewed: 08/10/2020  [employer] Patient Education © 2022 [employer]

## 2023-05-01 ENCOUNTER — TELEPHONE (OUTPATIENT)
Dept: PODIATRY | Facility: CLINIC | Age: 43
End: 2023-05-01
Payer: COMMERCIAL

## 2023-05-01 NOTE — TELEPHONE ENCOUNTER
Called patient regarding appt on 05/02/2023. Left message for patient to return call if any questions or concerns arise.

## 2023-05-02 ENCOUNTER — OFFICE VISIT (OUTPATIENT)
Dept: PODIATRY | Facility: CLINIC | Age: 43
End: 2023-05-02
Payer: COMMERCIAL

## 2023-05-02 VITALS
WEIGHT: 246 LBS | DIASTOLIC BLOOD PRESSURE: 86 MMHG | HEIGHT: 62 IN | HEART RATE: 75 BPM | BODY MASS INDEX: 45.27 KG/M2 | SYSTOLIC BLOOD PRESSURE: 130 MMHG | OXYGEN SATURATION: 98 %

## 2023-05-02 DIAGNOSIS — L60.0 INGROWN TOENAIL: Primary | ICD-10-CM

## 2023-05-02 DIAGNOSIS — M79.672 FOOT PAIN, BILATERAL: ICD-10-CM

## 2023-05-02 DIAGNOSIS — M79.671 FOOT PAIN, BILATERAL: ICD-10-CM

## 2023-05-02 NOTE — PATIENT INSTRUCTIONS
Fingernail or Toenail Removal, Adult  Fingernail or toenail removal is a procedure in which a health care provider removes a person's nail. This may be done because of an injury, accident, or medical condition. Removal of a nail may be necessary when the nail is ingrown, infected, or damaged, or when it has not grown properly.  Tell a health care provider about:  Any allergies you have.  All medicines you are taking, including vitamins, herbs, eye drops, creams, and over-the-counter medicines.  Any problems you or family members have had with anesthetic medicines.  Any blood disorders you have.  Any surgeries you have had.  Any medical conditions you have.  Whether you are pregnant or may be pregnant.  What are the risks?  Generally, this is a safe procedure. However, problems may occur, including:  Pain.  Bleeding.  Infection.  Allergic reactions to medicines.  Damage to nearby structures.  The nail growing back improperly.  What happens before the procedure?  Medicines  Ask your health care provider about:  Changing or stopping your regular medicines. This is especially important if you are taking diabetes medicines or blood thinners.  Taking medicines such as aspirin and ibuprofen. These medicines can thin your blood. Do not take these medicines unless your health care provider tells you to take them.  Taking over-the-counter medicines, vitamins, herbs, and supplements.  General instructions  Ask your health care provider what steps will be taken to help prevent infection. These may include:  Removing hair at the surgery site.  Washing skin with a germ-killing soap.  Receiving antibiotic medicine.  What happens during the procedure?  You will be given a medicine to numb the area (local anesthetic).  An instrument will be inserted underneath the nail to lift it up.  An incision may be made in your nail.  The nail will be removed.  A bandage (dressing) will be put over the area where the nail was removed.  The  procedure may vary among health care providers and hospitals.  What happens after the procedure?  Your blood pressure, heart rate, breathing rate, and blood oxygen level will be monitored until you leave the hospital or clinic.  If you had a fingernail removed, you may be given a finger splint to wear while you recover.  If you had a toenail removed, you may be given a surgical shoe to wear while you recover.  You may need to keep your hand or foot raised (elevated) or supported on a pillow for 24 hours or as long as told by your health care provider.  Summary  Removal of a nail may be necessary when the nail is ingrown, infected, or damaged, or when it has not grown properly.  Before the procedure, tell your health care provider about all medicines you take and any medical conditions you have.  You will be given medicine to numb the area, and the nail will be removed.  After a fingernail is removed, you may be given a finger splint to wear while you recover.  After a toenail is removed, you may be given a surgical shoe to wear while you recover.  This information is not intended to replace advice given to you by your health care provider. Make sure you discuss any questions you have with your health care provider.  Document Revised: 08/10/2020 Document Reviewed: 08/10/2020  [employer] Patient Education © 2022 [employer].

## 2023-05-15 ENCOUNTER — OFFICE VISIT (OUTPATIENT)
Dept: PSYCHIATRY | Age: 43
End: 2023-05-15

## 2023-05-15 VITALS
OXYGEN SATURATION: 98 % | HEIGHT: 61 IN | BODY MASS INDEX: 47.03 KG/M2 | WEIGHT: 249.1 LBS | RESPIRATION RATE: 20 BRPM | TEMPERATURE: 97 F | DIASTOLIC BLOOD PRESSURE: 79 MMHG | SYSTOLIC BLOOD PRESSURE: 120 MMHG | HEART RATE: 80 BPM

## 2023-05-15 DIAGNOSIS — F33.0 MILD EPISODE OF RECURRENT MAJOR DEPRESSIVE DISORDER (HCC): ICD-10-CM

## 2023-05-15 RX ORDER — DULOXETIN HYDROCHLORIDE 60 MG/1
60 CAPSULE, DELAYED RELEASE ORAL EVERY MORNING
Qty: 90 CAPSULE | Refills: 0 | Status: SHIPPED | OUTPATIENT
Start: 2023-05-15

## 2023-05-15 RX ORDER — BUSPIRONE HYDROCHLORIDE 5 MG/1
15 TABLET ORAL 3 TIMES DAILY
Qty: 90 TABLET | Refills: 0
Start: 2023-05-15

## 2023-05-15 ASSESSMENT — PATIENT HEALTH QUESTIONNAIRE - PHQ9
4. FEELING TIRED OR HAVING LITTLE ENERGY: 0
1. LITTLE INTEREST OR PLEASURE IN DOING THINGS: 0
SUM OF ALL RESPONSES TO PHQ QUESTIONS 1-9: 2
3. TROUBLE FALLING OR STAYING ASLEEP: 1
2. FEELING DOWN, DEPRESSED OR HOPELESS: 1
SUM OF ALL RESPONSES TO PHQ9 QUESTIONS 1 & 2: 1
8. MOVING OR SPEAKING SO SLOWLY THAT OTHER PEOPLE COULD HAVE NOTICED. OR THE OPPOSITE, BEING SO FIGETY OR RESTLESS THAT YOU HAVE BEEN MOVING AROUND A LOT MORE THAN USUAL: 0
9. THOUGHTS THAT YOU WOULD BE BETTER OFF DEAD, OR OF HURTING YOURSELF: 0
6. FEELING BAD ABOUT YOURSELF - OR THAT YOU ARE A FAILURE OR HAVE LET YOURSELF OR YOUR FAMILY DOWN: 0
5. POOR APPETITE OR OVEREATING: 0
7. TROUBLE CONCENTRATING ON THINGS, SUCH AS READING THE NEWSPAPER OR WATCHING TELEVISION: 0
SUM OF ALL RESPONSES TO PHQ QUESTIONS 1-9: 2
10. IF YOU CHECKED OFF ANY PROBLEMS, HOW DIFFICULT HAVE THESE PROBLEMS MADE IT FOR YOU TO DO YOUR WORK, TAKE CARE OF THINGS AT HOME, OR GET ALONG WITH OTHER PEOPLE: 0

## 2023-05-15 NOTE — PROGRESS NOTES
5/15/23          Progress Note    Quan Jefferson 1980      Chief Complaint   Patient presents with    Medication Check    Follow-up           Subjective:    Patient is a 43 y.o. female diagnosed with MDD and presents today for follow-up. Last seen in clinic on 11/14/22 and prior records were reviewed. Last visit: she reports she is doing really well lately. She has decreased her Buspar to 5 mg daily and states it is working well. She is taking 60 mg of Cymbalta and states she is feeling good on that. She discussed with multiple people and she states she has been well controlled on medications. We discussed not tapering at this time since she is receiving a benefit of medications. She is agreeable to this plan. She is looking forward to spending time with her husbands family for thanksgiving. Her brother and father are somewhat reclusive and she does not think she is going to spend much time with them. She is back to work at this time. She is still having some knee complications, she states her arthritis is pretty severe. She has an mri scheduled in December. She denies si hi avh, she denies side effects of medications. She is well groomed and dressed appropriately for the weather. Still has not followed up with neuro for sleep apnea management. Will follow up in 6 months. Today:  she is doing really well. She is expressing interest in decreasing buspar some more. She states she has good days and bad days but it is appropriate. She is bright calm and cooperative. She did have knee surgery on her left knee and has gotten good benefit, she is going to have surgery on her R knee as well. She has not gotten her machine for sleep apnea. She feels like she is resting well. She says work is going well. She is bright, and cooperative she denies SI HI AVH she denies side effects of medications. No adjustments to medications other than decreasing BuSpar.   We will follow-up in 1

## 2023-08-22 ENCOUNTER — TELEPHONE (OUTPATIENT)
Dept: PSYCHIATRY | Age: 43
End: 2023-08-22

## 2023-08-22 DIAGNOSIS — F33.0 MILD EPISODE OF RECURRENT MAJOR DEPRESSIVE DISORDER (HCC): ICD-10-CM

## 2023-08-22 RX ORDER — DULOXETIN HYDROCHLORIDE 60 MG/1
CAPSULE, DELAYED RELEASE ORAL
Qty: 90 CAPSULE | Refills: 0 | Status: SHIPPED | OUTPATIENT
Start: 2023-08-22

## 2023-08-22 NOTE — TELEPHONE ENCOUNTER
VM left to inform pt that refill RX for Duloxetine had been sent to her pharmacy-pt encouraged to call back if has any questions.

## 2023-08-22 NOTE — TELEPHONE ENCOUNTER
morning       Dispense:  90 capsule       Refill:  0                        No orders of the defined types were placed in this encounter. 9. Additional comments: Continue therapy, discussed sleep hygiene, discussed the use of coping skills and relaxation strategies to manage symptoms.             10.Over 50% of the total visit time of   30  minutes was spent on counseling and/or coordination of care of:                         1. Mild episode of recurrent major depressive disorder [facility])                              Psychotherapy Topics: mood/medication effectiveness family, health and occupational     Mackenzie Calix, APRN - CNP

## 2023-11-17 ENCOUNTER — TRANSCRIBE ORDERS (OUTPATIENT)
Dept: ADMINISTRATIVE | Facility: HOSPITAL | Age: 43
End: 2023-11-17
Payer: COMMERCIAL

## 2023-11-17 DIAGNOSIS — Z01.812 PRE-OPERATIVE LABORATORY EXAMINATION: ICD-10-CM

## 2023-11-17 DIAGNOSIS — M17.12 ARTHRITIS OF LEFT KNEE: ICD-10-CM

## 2023-11-17 DIAGNOSIS — D64.9 ANEMIA, UNSPECIFIED TYPE: Primary | ICD-10-CM

## 2023-11-17 DIAGNOSIS — K21.00 PEPTIC REFLUX ESOPHAGITIS: ICD-10-CM

## 2023-11-19 DIAGNOSIS — F33.0 MILD EPISODE OF RECURRENT MAJOR DEPRESSIVE DISORDER (HCC): ICD-10-CM

## 2023-11-20 ENCOUNTER — TELEPHONE (OUTPATIENT)
Dept: PSYCHIATRY | Age: 43
End: 2023-11-20

## 2023-11-20 RX ORDER — DULOXETIN HYDROCHLORIDE 60 MG/1
CAPSULE, DELAYED RELEASE ORAL
Qty: 90 CAPSULE | Refills: 0 | Status: SHIPPED | OUTPATIENT
Start: 2023-11-20

## 2023-11-20 NOTE — TELEPHONE ENCOUNTER
Pharmacy sent a request to refill pt's medication       Last office visit : 5/15/2023 Alma Rosa ASHRAF  Next office visit : 5/13/2024 Alma Rosa Fabiola ASHRAF    Requested Prescriptions     Pending Prescriptions Disp Refills    DULoxetine (CYMBALTA) 60 MG extended release capsule [Pharmacy Med Name: DULOXETINE HCL 60MG CPEP] 90 capsule 0     Sig: TAKE ONE CAPSULE BY MOUTH EVERY DAY IN THE MORNING            Maru Gutiérrez       5/15/23                                           Progress Note     Pramod Hannon 1980                           Chief Complaint   Patient presents with    Medication Check    Follow-up               Subjective:    Patient is a 43 y.o. female diagnosed with MDD and presents today for follow-up. Last seen in clinic on 11/14/22 and prior records were reviewed. Last visit: she reports she is doing really well lately. She has decreased her Buspar to 5 mg daily and states it is working well. She is taking 60 mg of Cymbalta and states she is feeling good on that. She discussed with multiple people and she states she has been well controlled on medications. We discussed not tapering at this time since she is receiving a benefit of medications. She is agreeable to this plan. She is looking forward to spending time with her husbands family for thanksgiving. Her brother and father are somewhat reclusive and she does not think she is going to spend much time with them. She is back to work at this time. She is still having some knee complications, she states her arthritis is pretty severe. She has an mri scheduled in December. She denies si hi avh, she denies side effects of medications. She is well groomed and dressed appropriately for the weather. Still has not followed up with neuro for sleep apnea management. Will follow up in 6 months. Today:  she is doing really well. She is expressing interest in decreasing buspar some more.   She states she has good days and bad days but it is

## 2023-11-20 NOTE — TELEPHONE ENCOUNTER
Called to let pt know that her script for duloxetine was sent to her pharmacy     Was unable to lvm     Electronically signed by Ligia Akhtar on 11/20/2023 at 2:30 PM

## 2023-11-21 ENCOUNTER — LAB (OUTPATIENT)
Dept: LAB | Facility: HOSPITAL | Age: 43
End: 2023-11-21
Payer: COMMERCIAL

## 2023-11-21 ENCOUNTER — HOSPITAL ENCOUNTER (OUTPATIENT)
Dept: CARDIOLOGY | Facility: HOSPITAL | Age: 43
Discharge: HOME | End: 2023-11-21
Payer: COMMERCIAL

## 2023-11-21 DIAGNOSIS — Z01.812 PRE-OPERATIVE LABORATORY EXAMINATION: ICD-10-CM

## 2023-11-21 DIAGNOSIS — M17.12 ARTHRITIS OF LEFT KNEE: ICD-10-CM

## 2023-11-21 DIAGNOSIS — K21.00 PEPTIC REFLUX ESOPHAGITIS: ICD-10-CM

## 2023-11-21 DIAGNOSIS — D64.9 ANEMIA, UNSPECIFIED TYPE: ICD-10-CM

## 2023-11-21 LAB
ALBUMIN SERPL-MCNC: 3.8 G/DL (ref 3.5–5.2)
ALBUMIN/GLOB SERPL: 1.4 G/DL
ALP SERPL-CCNC: 97 U/L (ref 39–117)
ALT SERPL W P-5'-P-CCNC: 9 U/L (ref 1–33)
ANION GAP SERPL CALCULATED.3IONS-SCNC: 7 MMOL/L (ref 5–15)
APTT PPP: 29.7 SECONDS (ref 24.5–36)
AST SERPL-CCNC: 12 U/L (ref 1–32)
BACTERIA UR QL AUTO: ABNORMAL /HPF
BASOPHILS # BLD AUTO: 0.03 10*3/MM3 (ref 0–0.2)
BASOPHILS NFR BLD AUTO: 0.4 % (ref 0–1.5)
BILIRUB SERPL-MCNC: 0.3 MG/DL (ref 0–1.2)
BILIRUB UR QL STRIP: NEGATIVE
BUN SERPL-MCNC: 11 MG/DL (ref 6–20)
BUN/CREAT SERPL: 16.9 (ref 7–25)
CALCIUM SPEC-SCNC: 8.7 MG/DL (ref 8.6–10.5)
CHLORIDE SERPL-SCNC: 104 MMOL/L (ref 98–107)
CLARITY UR: ABNORMAL
CO2 SERPL-SCNC: 30 MMOL/L (ref 22–29)
COLOR UR: ABNORMAL
CREAT SERPL-MCNC: 0.65 MG/DL (ref 0.57–1)
DEPRECATED RDW RBC AUTO: 41.2 FL (ref 37–54)
EGFRCR SERPLBLD CKD-EPI 2021: 112.2 ML/MIN/1.73
EOSINOPHIL # BLD AUTO: 0.15 10*3/MM3 (ref 0–0.4)
EOSINOPHIL NFR BLD AUTO: 1.8 % (ref 0.3–6.2)
ERYTHROCYTE [DISTWIDTH] IN BLOOD BY AUTOMATED COUNT: 13.8 % (ref 12.3–15.4)
GLOBULIN UR ELPH-MCNC: 2.7 GM/DL
GLUCOSE SERPL-MCNC: 113 MG/DL (ref 65–99)
GLUCOSE UR STRIP-MCNC: NEGATIVE MG/DL
HCT VFR BLD AUTO: 40.2 % (ref 34–46.6)
HGB BLD-MCNC: 12.7 G/DL (ref 12–15.9)
HGB UR QL STRIP.AUTO: NEGATIVE
HYALINE CASTS UR QL AUTO: ABNORMAL /LPF
IMM GRANULOCYTES # BLD AUTO: 0.02 10*3/MM3 (ref 0–0.05)
IMM GRANULOCYTES NFR BLD AUTO: 0.2 % (ref 0–0.5)
INR PPP: 1.01 (ref 0.91–1.09)
KETONES UR QL STRIP: NEGATIVE
LEUKOCYTE ESTERASE UR QL STRIP.AUTO: ABNORMAL
LYMPHOCYTES # BLD AUTO: 1.81 10*3/MM3 (ref 0.7–3.1)
LYMPHOCYTES NFR BLD AUTO: 21.6 % (ref 19.6–45.3)
MCH RBC QN AUTO: 26.1 PG (ref 26.6–33)
MCHC RBC AUTO-ENTMCNC: 31.6 G/DL (ref 31.5–35.7)
MCV RBC AUTO: 82.7 FL (ref 79–97)
MONOCYTES # BLD AUTO: 0.34 10*3/MM3 (ref 0.1–0.9)
MONOCYTES NFR BLD AUTO: 4.1 % (ref 5–12)
NEUTROPHILS NFR BLD AUTO: 6.03 10*3/MM3 (ref 1.7–7)
NEUTROPHILS NFR BLD AUTO: 71.9 % (ref 42.7–76)
NITRITE UR QL STRIP: NEGATIVE
NRBC BLD AUTO-RTO: 0 /100 WBC (ref 0–0.2)
PH UR STRIP.AUTO: 6.5 [PH] (ref 5–8)
PLATELET # BLD AUTO: 326 10*3/MM3 (ref 140–450)
PMV BLD AUTO: 9.5 FL (ref 6–12)
POTASSIUM SERPL-SCNC: 4.3 MMOL/L (ref 3.5–5.2)
PROT SERPL-MCNC: 6.5 G/DL (ref 6–8.5)
PROT UR QL STRIP: ABNORMAL
PROTHROMBIN TIME: 13.5 SECONDS (ref 11.8–14.8)
RBC # BLD AUTO: 4.86 10*6/MM3 (ref 3.77–5.28)
RBC # UR STRIP: ABNORMAL /HPF
REF LAB TEST METHOD: ABNORMAL
SODIUM SERPL-SCNC: 141 MMOL/L (ref 136–145)
SP GR UR STRIP: 1.03 (ref 1–1.03)
SQUAMOUS #/AREA URNS HPF: ABNORMAL /HPF
UROBILINOGEN UR QL STRIP: ABNORMAL
WBC # UR STRIP: ABNORMAL /HPF
WBC NRBC COR # BLD AUTO: 8.38 10*3/MM3 (ref 3.4–10.8)

## 2023-11-21 PROCEDURE — 93005 ELECTROCARDIOGRAM TRACING: CPT | Performed by: ORTHOPAEDIC SURGERY

## 2023-11-21 PROCEDURE — 81001 URINALYSIS AUTO W/SCOPE: CPT

## 2023-11-21 PROCEDURE — 85730 THROMBOPLASTIN TIME PARTIAL: CPT

## 2023-11-21 PROCEDURE — 80053 COMPREHEN METABOLIC PANEL: CPT

## 2023-11-21 PROCEDURE — 87086 URINE CULTURE/COLONY COUNT: CPT

## 2023-11-21 PROCEDURE — 87081 CULTURE SCREEN ONLY: CPT

## 2023-11-21 PROCEDURE — 85025 COMPLETE CBC W/AUTO DIFF WBC: CPT

## 2023-11-21 PROCEDURE — 36415 COLL VENOUS BLD VENIPUNCTURE: CPT

## 2023-11-21 PROCEDURE — 85610 PROTHROMBIN TIME: CPT

## 2023-11-22 LAB
BACTERIA SPEC AEROBE CULT: NORMAL
MRSA SPEC QL CULT: NORMAL
QT INTERVAL: 382 MS
QTC INTERVAL: 446 MS

## 2024-02-18 DIAGNOSIS — F33.0 MILD EPISODE OF RECURRENT MAJOR DEPRESSIVE DISORDER (HCC): ICD-10-CM

## 2024-02-19 ENCOUNTER — TELEPHONE (OUTPATIENT)
Dept: PSYCHIATRY | Age: 44
End: 2024-02-19

## 2024-02-19 RX ORDER — DULOXETIN HYDROCHLORIDE 60 MG/1
60 CAPSULE, DELAYED RELEASE ORAL EVERY MORNING
Qty: 90 CAPSULE | Refills: 0 | Status: SHIPPED | OUTPATIENT
Start: 2024-02-19

## 2024-02-19 NOTE — TELEPHONE ENCOUNTER
Called to let pt know that her script for duloxetine was sent to her pharmacy    Phone is disconnected    Electronically signed by Maru Gutiérrez on 2/19/2024 at 4:04 PM

## 2024-02-19 NOTE — TELEPHONE ENCOUNTER
Yes                    Can do serial 7's: Yes     No results found for: NA, K, CL, CO2, BUN, CREATININE, GLUCOSE, CALCIUM, PROT, LABALBU, BILITOT, ALKPHOS, AST, ALT, LABGLOM, GFRAA, AGRATIO, GLOB  No results found for: NA, K, CL, CO2, BUN, CREATININE, GLUCOSE, CALCIUM   No results found for: CHOL  No results found for: TRIG  No results found for: HDL  No results found for: LDLCHOLESTEROL, LDLCALC  No results found for: LABVLDL, VLDL  No results found for: CHOLHDLRATIO  No results found for: LABA1C  No results found for: EAG  No results found for: TSHFT4, TSH  No results found for: VITD25  No results found for: OFIPELHH95   No results found for: FOLATE      Assessment:    1. Mild episode of recurrent major depressive disorder (HCC)                No evidence of acute suicidality, homicidality or psychosis observed.  Patient is psychiatrically stable     Plan:     1.   Continue   Cymbalta, 60mg, daily in the morning  Buspirone, 5 mg,  time daily      (Follow up with restarting your BiPap)      Start      Discontinue        The risks, benefits, side effects, indications, contraindications, and adverse effects of the medications have been discussed. Yes.  2. The pt has verbalized understanding and has capacity to give informed consent.  3. The Stefan report has been reviewed according to HB1 regulations.  4. Supportive therapy offered.  5. Follow up:    Return in about 1 year (around 5/15/2024).  6. The patient has been advised to call with any problems.  7. Controlled substance Treatment Plan: NA.  8. The above listed medications have been continued, modifications in meds and other orders/labs as follows:                 Encounter Medications        Orders Placed This Encounter   Medications    busPIRone (BUSPAR) 5 MG tablet       Sig: Take 3 tablets by mouth 3 times daily       Dispense:  90 tablet       Refill:  0    DULoxetine (CYMBALTA) 60 MG extended release capsule       Sig: Take 1 capsule by mouth every morning

## 2024-05-10 ENCOUNTER — TELEPHONE (OUTPATIENT)
Dept: PSYCHIATRY | Age: 44
End: 2024-05-10

## 2024-05-10 NOTE — TELEPHONE ENCOUNTER
Called pt on 05/10/24 for appointment reminder for 05/13/24. Pt confirmed     Reminded patient to complete their visit pre-check/digital registration in [employer].

## 2024-05-13 ENCOUNTER — OFFICE VISIT (OUTPATIENT)
Dept: PSYCHIATRY | Age: 44
End: 2024-05-13
Payer: COMMERCIAL

## 2024-05-13 VITALS
SYSTOLIC BLOOD PRESSURE: 142 MMHG | DIASTOLIC BLOOD PRESSURE: 85 MMHG | BODY MASS INDEX: 42.73 KG/M2 | WEIGHT: 232.2 LBS | OXYGEN SATURATION: 97 % | TEMPERATURE: 97.4 F | HEART RATE: 63 BPM | HEIGHT: 62 IN

## 2024-05-13 DIAGNOSIS — F33.1 MODERATE EPISODE OF RECURRENT MAJOR DEPRESSIVE DISORDER (HCC): Primary | ICD-10-CM

## 2024-05-13 DIAGNOSIS — G47.00 INSOMNIA, UNSPECIFIED TYPE: ICD-10-CM

## 2024-05-13 PROCEDURE — 99214 OFFICE O/P EST MOD 30 MIN: CPT | Performed by: NURSE PRACTITIONER

## 2024-05-13 ASSESSMENT — PATIENT HEALTH QUESTIONNAIRE - PHQ9
9. THOUGHTS THAT YOU WOULD BE BETTER OFF DEAD, OR OF HURTING YOURSELF: NOT AT ALL
10. IF YOU CHECKED OFF ANY PROBLEMS, HOW DIFFICULT HAVE THESE PROBLEMS MADE IT FOR YOU TO DO YOUR WORK, TAKE CARE OF THINGS AT HOME, OR GET ALONG WITH OTHER PEOPLE: SOMEWHAT DIFFICULT
1. LITTLE INTEREST OR PLEASURE IN DOING THINGS: MORE THAN HALF THE DAYS
SUM OF ALL RESPONSES TO PHQ QUESTIONS 1-9: 11
8. MOVING OR SPEAKING SO SLOWLY THAT OTHER PEOPLE COULD HAVE NOTICED. OR THE OPPOSITE, BEING SO FIGETY OR RESTLESS THAT YOU HAVE BEEN MOVING AROUND A LOT MORE THAN USUAL: NOT AT ALL
4. FEELING TIRED OR HAVING LITTLE ENERGY: MORE THAN HALF THE DAYS
2. FEELING DOWN, DEPRESSED OR HOPELESS: MORE THAN HALF THE DAYS
SUM OF ALL RESPONSES TO PHQ QUESTIONS 1-9: 11
SUM OF ALL RESPONSES TO PHQ QUESTIONS 1-9: 11
3. TROUBLE FALLING OR STAYING ASLEEP: NEARLY EVERY DAY
6. FEELING BAD ABOUT YOURSELF - OR THAT YOU ARE A FAILURE OR HAVE LET YOURSELF OR YOUR FAMILY DOWN: SEVERAL DAYS
5. POOR APPETITE OR OVEREATING: NOT AT ALL
SUM OF ALL RESPONSES TO PHQ9 QUESTIONS 1 & 2: 4
7. TROUBLE CONCENTRATING ON THINGS, SUCH AS READING THE NEWSPAPER OR WATCHING TELEVISION: SEVERAL DAYS
SUM OF ALL RESPONSES TO PHQ QUESTIONS 1-9: 11

## 2024-05-13 NOTE — PROGRESS NOTES
0.00     Types: Cigarettes     Quit date: 2009     Years since quitting: 15.3    Smokeless tobacco: Never   Vaping Use    Vaping Use: Never used   Substance and Sexual Activity    Alcohol use: Never    Drug use: Never   Social History Narrative    SLEEP STUDY: Yes, home study, January, 2020, uses a BPAP       MSE:  Appearance: Appropriately groomed. Made good eye contact.  Gait stable.  No abnormal movements or tremor.   Behavior: Calm, cooperative, and socially appropriate. No psychomotor retardation/agitation appreciated.   Speech: Normal in tone, volume, and quality. No slurring, dysarthria or pressured speech noted.   Mood: \"ok\"   Affect: Mood congruent.   Thought Process: Appears linear, logical and goal oriented. Causality appears intact.   Thought Content: Denies active suicidal and homicidal ideations. No overt delusions or paranoia appreciated.   Perceptions: Denies auditory or visual hallucinations at present time. Not responding to internal stimuli.   Concentration: Intact.   Orientation: to person, place, date, and situation.   Language: Intact.   Fund of information: Intact.   Memory: Recent and remote appear intact.   Impulsivity: Limited.   Neurovegitative: Fair appetite and sleep.   Insight: Fair.   Judgment: Fair.    Cognition: Can spell \"world\" backwards: Yes                    Can do serial 7's: Yes    No results found for: \"NA\", \"K\", \"CL\", \"CO2\", \"BUN\", \"CREATININE\", \"GLUCOSE\", \"CALCIUM\", \"PROT\", \"LABALBU\", \"BILITOT\", \"ALKPHOS\", \"AST\", \"ALT\", \"LABGLOM\", \"GFRAA\", \"AGRATIO\", \"GLOB\"  No results found for: \"NA\", \"K\", \"CL\", \"CO2\", \"BUN\", \"CREATININE\", \"GLUCOSE\", \"CALCIUM\"   No results found for: \"CHOL\"  No results found for: \"TRIG\"  No results found for: \"HDL\"  No components found for: \"LDLCHOLESTEROL\", \"LDLCALC\"  No results found for: \"VLDL\"  No results found for: \"CHOLHDLRATIO\"  No results found for: \"LABA1C\"  No results found for: \"EAG\"  No results found for: \"TSHFT4\", \"TSH\"  No results found for:

## 2024-05-19 DIAGNOSIS — F33.0 MILD EPISODE OF RECURRENT MAJOR DEPRESSIVE DISORDER (HCC): ICD-10-CM

## 2024-05-20 ENCOUNTER — TELEPHONE (OUTPATIENT)
Dept: PSYCHIATRY | Age: 44
End: 2024-05-20

## 2024-05-20 RX ORDER — DULOXETIN HYDROCHLORIDE 60 MG/1
60 CAPSULE, DELAYED RELEASE ORAL EVERY MORNING
Qty: 90 CAPSULE | Refills: 0 | Status: SHIPPED | OUTPATIENT
Start: 2024-05-20

## 2024-05-20 NOTE — TELEPHONE ENCOUNTER
for: \"NA\", \"K\", \"CL\", \"CO2\", \"BUN\", \"CREATININE\", \"GLUCOSE\", \"CALCIUM\", \"PROT\", \"LABALBU\", \"BILITOT\", \"ALKPHOS\", \"AST\", \"ALT\", \"LABGLOM\", \"GFRAA\", \"AGRATIO\", \"GLOB\"  No results found for: \"NA\", \"K\", \"CL\", \"CO2\", \"BUN\", \"CREATININE\", \"GLUCOSE\", \"CALCIUM\"   No results found for: \"CHOL\"  No results found for: \"TRIG\"  No results found for: \"HDL\"  No components found for: \"LDLCHOLESTEROL\", \"LDLCALC\"  No results found for: \"VLDL\"  No results found for: \"CHOLHDLRATIO\"  No results found for: \"LABA1C\"  No results found for: \"EAG\"  No results found for: \"TSHFT4\", \"TSH\"  No results found for: \"VITD25\"  No results found for: \"SYJGAFEC90\"   No results found for: \"FOLATE\"      Assessment:    1. Moderate episode of recurrent major depressive disorder (HCC)    2. Insomnia, unspecified type                   No evidence of acute suicidality, homicidality or psychosis observed.  Patient is psychiatrically stable     Plan:     1.   Continue   Cymbalta, 60mg, daily in the morning           (Follow up with restarting your BiPap)            The risks, benefits, side effects, indications, contraindications, and adverse effects of the medications have been discussed. Yes.  2. The pt has verbalized understanding and has capacity to give informed consent.  3. The Stefan report has been reviewed according to HB1 regulations.  4. Supportive therapy offered.  5. Follow up:    Return in about 1 year (around 5/13/2025).  6. The patient has been advised to call with any problems.  7. Controlled substance Treatment Plan: NA.  8. The above listed medications have been continued, modifications in meds and other orders/labs as follows:                   Encounter Medications   No orders of the defined types were placed in this encounter.                     No orders of the defined types were placed in this encounter.           9. Additional comments: encourage therapy, discussed sleep hygiene, discussed the use of coping skills and relaxation

## 2024-06-14 ENCOUNTER — TELEPHONE (OUTPATIENT)
Dept: SURGERY | Facility: CLINIC | Age: 44
End: 2024-06-14
Payer: COMMERCIAL

## 2024-06-14 NOTE — TELEPHONE ENCOUNTER
Called patient to schedule her annual follow-up appointment from having bariatric surgery. Patient denied returning to our office and requested we take her off our list of post operative calls.  A letter was sent to the patient to check on them by Minoo Amezquita RN with the quality department.      [facility]  6/14

## 2024-06-21 ENCOUNTER — TELEPHONE (OUTPATIENT)
Dept: PSYCHIATRY | Age: 44
End: 2024-06-21

## 2024-06-21 RX ORDER — BUSPIRONE HYDROCHLORIDE 5 MG/1
15 TABLET ORAL 3 TIMES DAILY
Qty: 90 TABLET | Refills: 0 | Status: SHIPPED | OUTPATIENT
Start: 2024-06-21

## 2024-06-21 NOTE — TELEPHONE ENCOUNTER
Pt informed that RX for Buspar 5 mg take 3 tablets by mouth 3 times daily had been sent to her pharmacy.  Pt encouraged to make sure and read the bottle directions.  Spoke with pharmacy staff who reported that the insurance did cover the RX as above.

## 2024-06-21 NOTE — TELEPHONE ENCOUNTER
Carolin Salter called to request a refill on her medication.      Last office visit : 5/13/2024 with KENNETH ASHRAF  Next office visit : 5/12/25 with ABDIRAHMAN ASHRAF    Requested Prescriptions     Pending Prescriptions Disp Refills    busPIRone (BUSPAR) 5 MG tablet 90 tablet 0     Sig: Take 3 tablets by mouth 3 times daily            Luz Marina Morgan RN         5/13/24                                            Progress Note     Carolin Salter 1980                           Chief Complaint   Patient presents with    Medication Check    Follow-up               Subjective:    Patient is a 43 y.o. female diagnosed with MDD and presents today for follow-up.  Last seen in clinic on 5/15/23 and prior records were reviewed.     Last visit: she is doing really well.  She is expressing interest in decreasing buspar some more.  She states she has good days and bad days but it is appropriate.  She is bright calm and cooperative.  She did have knee surgery on her left knee and has gotten good benefit, she is going to have surgery on her R knee as well.  She has not gotten her machine for sleep apnea.  She feels like she is resting well.  She says work is going well.  She is bright, and cooperative she denies SI HI AVH she denies side effects of medications.  No adjustments to medications other than decreasing BuSpar.  We will follow-up in 1 year     Today:  she has started atenolol for an elevated heart rate.  She feels like she may need to restart anxiety medication.  She says she has been feeling more panicked lately and easily stressed out.  She has not followed up with neuro for suspected sleep apnea.  We discussed anti snoring mouth guards to see if this helps with her sleep related situations.  She has also discovered that father has cancer, supportive therapy provided at this time.    She feels like she may need to restart buspar 5 mg three times a day for anxiety management but does not want to start anything

## 2024-08-22 DIAGNOSIS — F33.0 MILD EPISODE OF RECURRENT MAJOR DEPRESSIVE DISORDER (HCC): ICD-10-CM

## 2024-08-22 RX ORDER — DULOXETIN HYDROCHLORIDE 60 MG/1
60 CAPSULE, DELAYED RELEASE ORAL EVERY MORNING
Qty: 90 CAPSULE | Refills: 0 | Status: SHIPPED | OUTPATIENT
Start: 2024-08-22

## 2024-08-22 NOTE — TELEPHONE ENCOUNTER
Pharmacy sent a request to refill pt's medication       Last office visit : 5/13/2024 KENNETH ASHRAF  Next office visit : 5/12/2025 ABDIRAHMAN ASHRAF    Requested Prescriptions     Pending Prescriptions Disp Refills    DULoxetine (CYMBALTA) 60 MG extended release capsule 90 capsule 0     Sig: Take 1 capsule by mouth every morning            Maru Gutiérrez      5/13/24                                            Progress Note     Carolin MILLA Salter 1980                           Chief Complaint   Patient presents with    Medication Check    Follow-up               Subjective:    Patient is a 43 y.o. female diagnosed with MDD and presents today for follow-up.  Last seen in clinic on 5/15/23 and prior records were reviewed.     Last visit: she is doing really well.  She is expressing interest in decreasing buspar some more.  She states she has good days and bad days but it is appropriate.  She is bright calm and cooperative.  She did have knee surgery on her left knee and has gotten good benefit, she is going to have surgery on her R knee as well.  She has not gotten her machine for sleep apnea.  She feels like she is resting well.  She says work is going well.  She is bright, and cooperative she denies SI HI AVH she denies side effects of medications.  No adjustments to medications other than decreasing BuSpar.  We will follow-up in 1 year     Today:  she has started atenolol for an elevated heart rate.  She feels like she may need to restart anxiety medication.  She says she has been feeling more panicked lately and easily stressed out.  She has not followed up with neuro for suspected sleep apnea.  We discussed anti snoring mouth guards to see if this helps with her sleep related situations.  She has also discovered that father has cancer, supportive therapy provided at this time.    She feels like she may need to restart buspar 5 mg three times a day for anxiety management but does not want to start anything at this

## 2024-11-01 NOTE — PROGRESS NOTES
Subjective   Cathi Krishnan is a 36 y.o. female for reevaluation of her secondary amenorrhea.    History of Present Illness  Patient is a 35 yo WF with secondary amenorrhea.  She is not postmenopausal.  Patient was given Provera last month and did have a withdrawal bleed.  The following portions of the patient's history were reviewed and updated as appropriate: allergies, current medications, past family history, past medical history, past social history, past surgical history and problem list.    Review of Systems   Constitutional: Negative for fatigue and unexpected weight change.   HENT: Negative.    Eyes: Negative.    Respiratory: Negative for cough, chest tightness and shortness of breath.    Cardiovascular: Negative for chest pain, palpitations and leg swelling.   Gastrointestinal: Negative for abdominal distention, abdominal pain, anal bleeding, blood in stool, constipation, diarrhea, nausea and vomiting.   Endocrine: Negative for polydipsia and polyuria.   Genitourinary: Positive for menstrual problem. Negative for difficulty urinating, dyspareunia, dysuria, frequency, genital sores, hematuria, pelvic pain, urgency, vaginal bleeding, vaginal discharge and vaginal pain.   Musculoskeletal: Negative.    Skin: Negative for color change and rash.   Allergic/Immunologic: Negative.    Neurological: Negative.  Negative for dizziness, seizures, syncope, light-headedness and headaches.   Hematological: Negative for adenopathy. Does not bruise/bleed easily.   Psychiatric/Behavioral: Negative for agitation, confusion, dysphoric mood, sleep disturbance and suicidal ideas. The patient is not nervous/anxious.        Objective   Physical Exam   Constitutional: She is oriented to person, place, and time. She appears well-developed and well-nourished.   HENT:   Head: Normocephalic.   Eyes: Pupils are equal, round, and reactive to light.   Neck: Normal range of motion.   Pulmonary/Chest: Effort normal.   Abdominal: Soft.    Musculoskeletal: Normal range of motion.   Neurological: She is alert and oriented to person, place, and time.   Skin: Skin is warm.   Psychiatric: She has a normal mood and affect. Her behavior is normal. Judgment and thought content normal.   Nursing note and vitals reviewed.      Assessment/Plan   Secondary amenorrhea  Pt had withdrawal bleeding with Provera  Recommend Provera 10 mg the first 10 days of each month  Continue to keep a bleeding calendar and RV 3 months            No

## 2024-11-14 ENCOUNTER — TELEPHONE (OUTPATIENT)
Dept: PSYCHIATRY | Age: 44
End: 2024-11-14

## 2024-11-14 DIAGNOSIS — F33.0 MILD EPISODE OF RECURRENT MAJOR DEPRESSIVE DISORDER (HCC): ICD-10-CM

## 2024-11-14 NOTE — TELEPHONE ENCOUNTER
\"BUN\", \"CREATININE\", \"GLUCOSE\", \"CALCIUM\", \"PROT\", \"LABALBU\", \"BILITOT\", \"ALKPHOS\", \"AST\", \"ALT\", \"LABGLOM\", \"GFRAA\", \"AGRATIO\", \"GLOB\"  No results found for: \"NA\", \"K\", \"CL\", \"CO2\", \"BUN\", \"CREATININE\", \"GLUCOSE\", \"CALCIUM\"   No results found for: \"CHOL\"  No results found for: \"TRIG\"  No results found for: \"HDL\"  No components found for: \"LDLCHOLESTEROL\", \"LDLCALC\"  No results found for: \"VLDL\"  No results found for: \"CHOLHDLRATIO\"  No results found for: \"LABA1C\"  No results found for: \"EAG\"  No results found for: \"TSHFT4\", \"TSH\"  No results found for: \"VITD25\"  No results found for: \"NDONRTBP75\"   No results found for: \"FOLATE\"      Assessment:    1. Moderate episode of recurrent major depressive disorder (HCC)    2. Insomnia, unspecified type                   No evidence of acute suicidality, homicidality or psychosis observed.  Patient is psychiatrically stable     Plan:     1.   Continue   Cymbalta, 60mg, daily in the morning           (Follow up with restarting your BiPap)            The risks, benefits, side effects, indications, contraindications, and adverse effects of the medications have been discussed. Yes.  2. The pt has verbalized understanding and has capacity to give informed consent.  3. The Stefan report has been reviewed according to HB1 regulations.  4. Supportive therapy offered.  5. Follow up:    Return in about 1 year (around 5/13/2025).  6. The patient has been advised to call with any problems.  7. Controlled substance Treatment Plan: NA.  8. The above listed medications have been continued, modifications in meds and other orders/labs as follows:                   Encounter Medications   No orders of the defined types were placed in this encounter.                     No orders of the defined types were placed in this encounter.           9. Additional comments: encourage therapy, discussed sleep hygiene, discussed the use of coping skills and relaxation strategies to manage symptoms.

## 2024-11-14 NOTE — TELEPHONE ENCOUNTER
Called pt to cancel/reschedule her appt for 05/12/25 with Hilary Shine because the provider will no longer be at this practice.     Phone not in service  Sent a [facility] 19 chart message.  Rescheduled for the same day 05/12/25 @ 8am with Dr. Motley.

## 2024-11-15 ENCOUNTER — TELEPHONE (OUTPATIENT)
Dept: PSYCHIATRY | Age: 44
End: 2024-11-15

## 2024-11-15 RX ORDER — DULOXETIN HYDROCHLORIDE 60 MG/1
60 CAPSULE, DELAYED RELEASE ORAL EVERY MORNING
Qty: 90 CAPSULE | Refills: 3 | Status: SHIPPED | OUTPATIENT
Start: 2024-11-15 | End: 2025-02-13

## 2024-12-10 ENCOUNTER — OFFICE VISIT (OUTPATIENT)
Age: 44
End: 2024-12-10

## 2024-12-10 VITALS — BODY MASS INDEX: 43.43 KG/M2 | WEIGHT: 236 LBS | HEIGHT: 62 IN

## 2024-12-10 DIAGNOSIS — Z96.651 PRESENCE OF RIGHT ARTIFICIAL KNEE JOINT: Primary | ICD-10-CM

## 2024-12-10 DIAGNOSIS — Z96.652 PRESENCE OF LEFT ARTIFICIAL KNEE JOINT: ICD-10-CM

## 2024-12-10 DIAGNOSIS — M17.0 PRIMARY OSTEOARTHRITIS OF BOTH KNEES: ICD-10-CM

## 2024-12-10 RX ORDER — FAMOTIDINE 20 MG/1
20 TABLET, FILM COATED ORAL 2 TIMES DAILY
COMMUNITY
Start: 2024-11-25

## 2024-12-10 NOTE — PROGRESS NOTES
[facility]  [facility]  [address]  [facility] [id number] [phone]     Patient: Carolin Salter   YOB: 1980   Date: 12/10/2024     Chief Complaint   Patient presents with    Follow-up     Lb TKR   LTK12/15/2023  RTK 2022        History of Present Illness  This is a 44 y.o. female presents today she is status post staged knee replacements doing well up.  She is about 1 year out of the left and almost 2 years on the right she has back working at a [employer] she is very active.  She does have some what sounds like soft tissue type pain especially at the end of the day and also in the morning she feels stiff when she wakes up.    Past Medical History:   Diagnosis Date    Anxiety     Arthritis       Past Surgical History:   Procedure Laterality Date    CHOLECYSTECTOMY      GASTRIC BAND      INGUINAL HERNIA REPAIR Left     JOINT REPLACEMENT      KNEE CARTILAGE SURGERY Left 2019    LEFT KNEE ARTHROSCOPIC PARTIAL MEDIAL MENISCECTOMY performed by Eleazar Stark MD at [facility] OR    KNEE SURGERY      TONSILLECTOMY AND ADENOIDECTOMY      TUBAL LIGATION Bilateral     ensure?       Social History     Socioeconomic History    Marital status:      Spouse name: None    Number of children: None    Years of education: None    Highest education level: None   Tobacco Use    Smoking status: Former     Current packs/day: 0.00     Types: Cigarettes     Quit date: 2011     Years since quittin.6    Smokeless tobacco: Never   Vaping Use    Vaping status: Never Used   Substance and Sexual Activity    Alcohol use: Not Currently    Drug use: Never    Sexual activity: Yes     Partners: Male     Comment: My    Social History Narrative    SLEEP STUDY: Yes, home study, , uses a BPAP     Social Determinants of Health      Received from [facility], [facility]    Family and

## 2025-01-07 ENCOUNTER — OFFICE VISIT (OUTPATIENT)
Dept: BARIATRICS/WEIGHT MGMT | Facility: CLINIC | Age: 45
End: 2025-01-07
Payer: COMMERCIAL

## 2025-01-07 VITALS
WEIGHT: 239.3 LBS | HEIGHT: 62 IN | BODY MASS INDEX: 44.03 KG/M2 | DIASTOLIC BLOOD PRESSURE: 66 MMHG | TEMPERATURE: 98.6 F | OXYGEN SATURATION: 96 % | HEART RATE: 70 BPM | SYSTOLIC BLOOD PRESSURE: 128 MMHG

## 2025-01-07 DIAGNOSIS — K31.84 GASTRIC PARESIS: ICD-10-CM

## 2025-01-07 DIAGNOSIS — Z98.84 STATUS POST LAPAROSCOPIC SLEEVE GASTRECTOMY: Primary | ICD-10-CM

## 2025-01-07 RX ORDER — ATENOLOL 25 MG/1
25 TABLET ORAL DAILY
COMMUNITY
Start: 2024-11-17

## 2025-01-07 RX ORDER — OXYBUTYNIN CHLORIDE 10 MG/1
10 TABLET, EXTENDED RELEASE ORAL 2 TIMES DAILY
COMMUNITY

## 2025-01-07 RX ORDER — METOCLOPRAMIDE 10 MG/1
10 TABLET ORAL 2 TIMES DAILY
COMMUNITY
Start: 2024-12-12

## 2025-01-07 RX ORDER — NORGESTIMATE AND ETHINYL ESTRADIOL 7DAYSX3 28
1 KIT ORAL EVERY 24 HOURS
COMMUNITY

## 2025-01-07 RX ORDER — FAMOTIDINE 20 MG/1
1 TABLET, FILM COATED ORAL 2 TIMES DAILY
COMMUNITY
Start: 2024-11-25

## 2025-01-07 RX ORDER — CELECOXIB 200 MG/1
200 CAPSULE ORAL DAILY
COMMUNITY
Start: 2024-12-18

## 2025-01-07 NOTE — PROGRESS NOTES
"  Patient Care Team:  Corina Cherry APRN as PCP - General (Family Medicine)    Subjective     Cathi Krishnan is a 44 y.o. female.     Cathi is post op 6 years from her sleeve gastrectomy procedure.  She is currently on her regular diet.  She is returned to our program due to symptoms of abdominal pain after eating.  She stated the symptoms started approximately 1 to 1-1/2 months ago.  She has not associated with any specific types of foods.  She was seen by her primary care provider who obtained a gastric emptying study which disclosed gastric paresis findings.  She has been on Reglan which appears to have improved her symptoms currently.    Review Of Systems:  General ROS: negative  Respiratory ROS: no cough, shortness of breath, or wheezing  Cardiovascular ROS: no chest pain or dyspnea on exertion  Gastrointestinal ROS: positive for - abdominal pain and this is postprandial  negative for - appetite loss, constipation, gas/bloating, heartburn, melena, or nausea/vomiting    The following portions of the patient's history were reviewed and updated as appropriate: allergies, current medications, past family history, past medical history, past social history, past surgical history, and problem list.    Objective   /66 (BP Location: Right arm, Patient Position: Sitting, Cuff Size: Adult)   Pulse 70   Temp 98.6 °F (37 °C)   Ht 157.5 cm (62\")   Wt 109 kg (239 lb 4.8 oz)   SpO2 96%   BMI 43.77 kg/m²       01/07/25  0851   Weight: 109 kg (239 lb 4.8 oz)        General Appearance:  awake, alert, oriented, in no acute distress  Abdomen:  Soft, non-tender, normal bowel sounds; no bruits, organomegaly or masses.  Abnormal shape: obese  Wounds: clean, dry, intact    ASSESSMENT/ PLAN    Encounter Diagnoses   Name Primary?    Status post laparoscopic sleeve gastrectomy Yes    Gastric paresis        Cathi Krishnan and I discussed the importance of finding certain foods that may be triggering her " symptoms.  I have requested we consider doing a food journal due to her symptoms improving on Reglan at this time.  Her monthly food journal will be reviewed by our program.  She will also rejoin our program since she has been called since 2019 for continued obesity care.  She also suffers from morbid obesity.  We first reviewed again the definition of a meal which is defined as portion sizes less than a half a cup and those portions incorporating a protein.  Specifically the protein should fill half of that volume.  I also explained that she should attempt to consume 4 meals as defined above daily and to avoid snacking or grazing.  She should also be mindful of adequate hydration by consuming at least 64 oz of water daily and incorporation of a regular exercise regimen.   I discussed the importance of taking her multivitamins as directed.  I explained to the patient they will be obtaining laboratory work to check their vitamin status.   It is important for them to continue to exercise on a regular basis and continue to practice stress relieving activities to avoid stress eating.  Patient's (Body mass index is 43.77 kg/m².) indicates that they are morbidly obese (BMI > 40 or > 35 with obesity - related health condition) with health related conditions that include GERD and osteoarthritis . Weight is unchanged. BMI is is above average; BMI management plan is completed. We discussed portion control, increasing exercise, and management of depression/anxiety/stress to control compensatory eating.    Will continue to use conservative methods first.  She will rejoin a program at this with our dietitian as well.  If symptoms progress or do not improve then discussion for surgical conversion may be required.  This I discussed with the patient which she is in agreement with and will follow accordingly.  She is to return to our office 1 month or as needed.    01/07/25  09:46 CST  Patient Care Team:  Corina Cherry APRN  as PCP - General (Family Medicine)  Julio C Rodriguez MD FACS

## 2025-01-31 ENCOUNTER — OFFICE VISIT (OUTPATIENT)
Dept: BARIATRICS/WEIGHT MGMT | Facility: CLINIC | Age: 45
End: 2025-01-31
Payer: COMMERCIAL

## 2025-01-31 ENCOUNTER — NUTRITION (OUTPATIENT)
Dept: BARIATRICS/WEIGHT MGMT | Facility: CLINIC | Age: 45
End: 2025-01-31
Payer: COMMERCIAL

## 2025-01-31 VITALS
WEIGHT: 237.6 LBS | OXYGEN SATURATION: 96 % | TEMPERATURE: 98.4 F | HEIGHT: 62 IN | HEART RATE: 61 BPM | DIASTOLIC BLOOD PRESSURE: 81 MMHG | SYSTOLIC BLOOD PRESSURE: 114 MMHG | BODY MASS INDEX: 43.72 KG/M2

## 2025-01-31 DIAGNOSIS — E66.01 CLASS 3 SEVERE OBESITY DUE TO EXCESS CALORIES WITHOUT SERIOUS COMORBIDITY WITH BODY MASS INDEX (BMI) OF 40.0 TO 44.9 IN ADULT: ICD-10-CM

## 2025-01-31 DIAGNOSIS — K31.84 GASTRIC PARESIS: ICD-10-CM

## 2025-01-31 DIAGNOSIS — Z98.84 STATUS POST LAPAROSCOPIC SLEEVE GASTRECTOMY: ICD-10-CM

## 2025-01-31 DIAGNOSIS — Z98.84 STATUS POST LAPAROSCOPIC SLEEVE GASTRECTOMY: Primary | ICD-10-CM

## 2025-01-31 DIAGNOSIS — E66.813 CLASS 3 SEVERE OBESITY DUE TO EXCESS CALORIES WITHOUT SERIOUS COMORBIDITY WITH BODY MASS INDEX (BMI) OF 40.0 TO 44.9 IN ADULT: ICD-10-CM

## 2025-01-31 DIAGNOSIS — E66.813 CLASS 3 SEVERE OBESITY DUE TO EXCESS CALORIES WITHOUT SERIOUS COMORBIDITY WITH BODY MASS INDEX (BMI) OF 40.0 TO 44.9 IN ADULT: Primary | ICD-10-CM

## 2025-01-31 DIAGNOSIS — E66.01 CLASS 3 SEVERE OBESITY DUE TO EXCESS CALORIES WITHOUT SERIOUS COMORBIDITY WITH BODY MASS INDEX (BMI) OF 40.0 TO 44.9 IN ADULT: Primary | ICD-10-CM

## 2025-01-31 NOTE — PROGRESS NOTES
"Patient Care Team:  Corina Cherry APRN as PCP - General (Family Medicine)    Chief Complaint: S/P Sleeve Gastrectomy, 6 years- New finding of gastroparesis     Subjective     History of Present Illness  The patient presents for a postoperative appointment and to discuss a referral.    She was recently diagnosed with gastroparesis recently. She reports experiencing regurgitation, which has led her to reduce her food intake.    Her dietary habits include consuming protein shakes in the morning, followed by a protein bar at 9:30 AM, lunch at 11:30 AM, and then abstaining from food until dinner. She primarily consumes proteins and does not measure her portion sizes. Despite her efforts to lose weight, she has been unable to maintain a consistent weight loss, fluctuating between 231 and 240 pounds.      Objective     Vital Signs  /81 (BP Location: Right arm, Patient Position: Sitting, Cuff Size: Adult)   Pulse 61   Temp 98.4 °F (36.9 °C) (Temporal)   Ht 157.5 cm (62\")   Wt 108 kg (237 lb 9.6 oz)   SpO2 96%   BMI 43.46 kg/m²          Physical Exam:  Physical Exam  Vitals reviewed.   Constitutional:       Appearance: She is obese.   Cardiovascular:      Rate and Rhythm: Normal rate and regular rhythm.   Pulmonary:      Effort: Pulmonary effort is normal.   Musculoskeletal:         General: Normal range of motion.   Skin:     General: Skin is warm and dry.   Neurological:      Mental Status: She is alert and oriented to person, place, and time.   Psychiatric:         Mood and Affect: Mood normal.         Behavior: Behavior normal.          Physical Exam  Vital Signs  BMI is 43.     Results Review:    Labs reviewed    Results        Medication Reviewed:   Current Outpatient Medications   Medication Sig Dispense Refill    atenolol (TENORMIN) 25 MG tablet Take 1 tablet by mouth Daily.      busPIRone (BUSPAR) 15 MG tablet Take 1 tablet by mouth 3 (Three) Times a Day.      celecoxib (CeleBREX) 200 MG capsule " Take 1 capsule by mouth Daily.      DULoxetine (CYMBALTA) 60 MG capsule Take 1 capsule by mouth 2 (Two) Times a Day.      famotidine (PEPCID) 20 MG tablet Take 1 tablet by mouth 2 (Two) Times a Day.      ferrous sulfate 325 (65 FE) MG tablet Take 1 tablet by mouth Daily With Breakfast.      HYDROcodone-acetaminophen (NORCO) 5-325 MG per tablet Take 1 tablet by mouth Every 6 (Six) Hours As Needed.      metoclopramide (REGLAN) 10 MG tablet Take 1 tablet by mouth 2 (Two) Times a Day.      norgestimate-ethinyl estradiol (ORTHO TRI-CYCLEN,TRINESSA) 0.18/0.215/0.25 MG-35 MCG per tablet Take 1 tablet by mouth Daily.      oxybutynin XL (DITROPAN-XL) 10 MG 24 hr tablet Take 1 tablet by mouth 2 (Two) Times a Day.      pantoprazole (PROTONIX) 40 MG EC tablet Take 1 tablet by mouth Daily. 30 tablet 1     No current facility-administered medications for this visit.       Assessment & Plan      Assessment & Plan  1. Severe gastroparesis.  She was recently diagnosed with severe gastroparesis. A gastric bypass is recommended as it will help treat both her gastroparesis and obesity. She is advised to eat slowly and avoid processed foods to manage her symptoms better. A referral will be made to Dr. Cook in [place] for further discussion regarding revision surgery.    2. Obesity.  Her BMI is currently 43. She has been experiencing fluctuations in her weight, going down to 231 pounds and then back up to 240 pounds. She is advised to follow a 1-cup postoperative meal plan and focus on her eating behaviors. She is encouraged to utilize the Facebook group for additional support and to identify any behaviors that may be hindering her weight loss progress.    PROCEDURE  The patient underwent a sleeve gastrectomy 6 years ago.    POD  6 years ago  from Sleeve Gastrectomy.  Diagnoses and all orders for this visit:    1. Status post laparoscopic sleeve gastrectomy (Primary)  -     Ambulatory Referral to Bariatric Surgery    2. Gastric  paresis  -     Ambulatory Referral to Bariatric Surgery    3. Class 3 severe obesity due to excess calories without serious comorbidity with body mass index (BMI) of 40.0 to 44.9 in adult  Assessment & Plan:  Patient's (Body mass index is 43.46 kg/m².) indicates that they are morbidly/severely obese (BMI > 40 or > 35 with obesity - related health condition) with health conditions that include GERD . Weight is improving with treatment. BMI  is above average; BMI management plan is completed. We discussed portion control and increasing exercise.              Cathi Krishnan and I discussed the importance of changing behavior for consistent and successful weight loss. We first reviewed again the definition of a meal which is defined as portion sizes less than a half a cup and those portions incorporating a protein. Specifically the protein should fill half of that volume. I also explained that she should attempt to consume 4 meals as defined above daily and to avoid snacking or grazing. She should also be mindful of adequate hydration by consuming at least 64 oz of water daily and incorporation of a regular exercise regimen.     I discussed the importance of taking her multivitamins as directed.  She is to return to our office as needed.      ARGENIS Alcocer  01/31/25  10:28 CST    Patient or patient representative verbalized consent for the use of Ambient Listening during the visit with  ARGENIS Alcocer for chart documentation. 1/31/2025  10:17 CST

## 2025-01-31 NOTE — PROGRESS NOTES
"Metabolic and Bariatric Surgery Adult Nutrition Assessment    Patient Name: Cathi Krishnan   YOB: 1980   MRN: [id number]     Assessment Date:  01/31/2025     Reason for Visit: Initial Post-Surgical New Patient Nutrition Education Class    Treatment Pathway: Postoperative Gastric Sleeve Gastrectomy 5+ yrs    Assessment    Anthropometrics   Wt Readings from Last 1 Encounters:   01/31/25 108 kg (237 lb 9.6 oz)     Ht Readings from Last 1 Encounters:   01/31/25 157.5 cm (62\")     BMI Readings from Last 1 Encounters:   01/31/25 43.46 kg/m²        Surgery Date: Nov 28, 2018      Past Medical History:   Diagnosis Date    Ankle swelling     Anxiety     Arthritis     Back pain     Callus 2014    Constipation     Depression     Excessive thirst     GERD (gastroesophageal reflux disease)     Heartburn     History of attempted suicide     age 13 years old     Ingrown toenail 2020    Leg cramps     with walking    Loss of bladder control leaking urine    Pain     Shoulder, neck, knee,back      Rash     in skin folds     Rheumatoid arthritis     \"starting\" was mild case per Dr. Pelaez    Varicose veins of both lower extremities     Wears glasses       Past Surgical History:   Procedure Laterality Date    CHOLECYSTECTOMY  1999    lap    ENDOSCOPY      ENDOSCOPY N/A 07/31/2018    Procedure: ESOPHAGOGASTRODUODENOSCOPY WITH ANESTHESIA;  Surgeon: Julio C Rodriguez MD;  Location: [facility] ENDOSCOPY;  Service: General    ESSURE TUBAL LIGATION      2016 & 2017     GASTRIC SLEEVE LAPAROSCOPIC N/A 11/28/2018    Procedure: GASTRIC SLEEVE LAPAROSCOPIC;  Surgeon: Julio C Rodriguez MD;  Location: [facility] OR;  Service: Bariatric    HERNIA REPAIR Left     inguinal; without mesh     TEETH EXTRACTION      TOENAIL EXCISION  4/14/2023    TONSILLECTOMY      TOTAL KNEE ARTHROPLASTY Right 12/28/2022      Current Outpatient Medications   Medication Sig Dispense Refill    atenolol (TENORMIN) 25 MG tablet Take 1 tablet by mouth Daily.      " "busPIRone (BUSPAR) 15 MG tablet Take 1 tablet by mouth 3 (Three) Times a Day.      celecoxib (CeleBREX) 200 MG capsule Take 1 capsule by mouth Daily.      DULoxetine (CYMBALTA) 60 MG capsule Take 1 capsule by mouth 2 (Two) Times a Day.      famotidine (PEPCID) 20 MG tablet Take 1 tablet by mouth 2 (Two) Times a Day.      ferrous sulfate 325 (65 FE) MG tablet Take 1 tablet by mouth Daily With Breakfast.      HYDROcodone-acetaminophen (NORCO) 5-325 MG per tablet Take 1 tablet by mouth Every 6 (Six) Hours As Needed.      metoclopramide (REGLAN) 10 MG tablet Take 1 tablet by mouth 2 (Two) Times a Day.      norgestimate-ethinyl estradiol (ORTHO TRI-CYCLEN,TRINESSA) 0.18/0.215/0.25 MG-35 MCG per tablet Take 1 tablet by mouth Daily.      oxybutynin XL (DITROPAN-XL) 10 MG 24 hr tablet Take 1 tablet by mouth 2 (Two) Times a Day.      pantoprazole (PROTONIX) 40 MG EC tablet Take 1 tablet by mouth Daily. 30 tablet 1     No current facility-administered medications for this visit.      Allergies   Allergen Reactions    Asa [Aspirin] Shortness Of Breath and Swelling    Cortisone Shortness Of Breath and Swelling    Penicillins Hives and Shortness Of Breath     \"All Cillins\"    Prednisone Shortness Of Breath and Swelling    Zantac [Ranitidine Hcl] Shortness Of Breath and Swelling    Ibuprofen Hives    Nsaids Unknown (See Comments)     Bariatric Surgery          Nutrition Intervention  Nutrition education and nutrition coaching for behavior change provided.  Strategies used included Comprehensive education & skill development for measuring portions, reading food labels, calculating protein, meal planning, understanding appropriate drink choices, and evaluating condiments, seasonings, and cooking methods.   Review of medical weight loss prescription plan and reviewed nutritional needs for Postoperative Gastric Sleeve Gastrectomy 5+ yrs .   Discussed importance ofo daily bariatric or multi-vitamin daily, B12, Calcium Citrate with " Vitamin D, and meal plan for promoting adequate nutrition:  Eat 4 meals per day at 1 c portions, , Half of all meals should be servings of vegetables., Protein goal: 65 gms., 1 protein shake daily (discussed guidelines of compliant shakes), Eliminate snacks., Reduce fat, sugar, and/or salt in food choices., Choose more nutrient dense foods., Choose foods with increased fiber., Monitor portion sizes using measuring cups., Eliminate soda and sugar-sweetened beverages, and Increase fluid intake to 64 ounces per day  Self-monitoring strategies such as keeping a food journal (on paper or electronically) were discussed.  Recommended increasing physical activity, beyond normal daily habits, gradually working to reach ~30 minutes daily.     Monitoring/Evaluation Plan  Anticipate follow up in 2 weeks. Continue collaboration of care with physician and treatment team.     Time Spent 30 minutes    Electronically signed by  Libby Vasquez RDN, LD  01/31/2025 13:01 CST.

## 2025-01-31 NOTE — ASSESSMENT & PLAN NOTE
Patient's (Body mass index is 43.46 kg/m².) indicates that they are morbidly/severely obese (BMI > 40 or > 35 with obesity - related health condition) with health conditions that include GERD . Weight is improving with treatment. BMI  is above average; BMI management plan is completed. We discussed portion control and increasing exercise.

## 2025-02-04 ENCOUNTER — OFFICE VISIT (OUTPATIENT)
Dept: NEUROSURGERY | Facility: CLINIC | Age: 45
End: 2025-02-04
Payer: COMMERCIAL

## 2025-02-04 VITALS — WEIGHT: 235 LBS | HEIGHT: 62 IN | BODY MASS INDEX: 43.24 KG/M2

## 2025-02-04 DIAGNOSIS — E66.813 CLASS 3 SEVERE OBESITY DUE TO EXCESS CALORIES WITHOUT SERIOUS COMORBIDITY WITH BODY MASS INDEX (BMI) OF 40.0 TO 44.9 IN ADULT: ICD-10-CM

## 2025-02-04 DIAGNOSIS — E66.01 CLASS 3 SEVERE OBESITY DUE TO EXCESS CALORIES WITHOUT SERIOUS COMORBIDITY WITH BODY MASS INDEX (BMI) OF 40.0 TO 44.9 IN ADULT: ICD-10-CM

## 2025-02-04 DIAGNOSIS — M54.41 CHRONIC BILATERAL LOW BACK PAIN WITH RIGHT-SIDED SCIATICA: Primary | ICD-10-CM

## 2025-02-04 DIAGNOSIS — G89.29 CHRONIC BILATERAL LOW BACK PAIN WITH RIGHT-SIDED SCIATICA: Primary | ICD-10-CM

## 2025-02-04 DIAGNOSIS — Z78.9 NONSMOKER: ICD-10-CM

## 2025-02-04 RX ORDER — FUROSEMIDE 20 MG/1
20 TABLET ORAL
COMMUNITY
Start: 2025-01-20

## 2025-02-04 RX ORDER — HYDROCODONE BITARTRATE AND ACETAMINOPHEN 7.5; 325 MG/1; MG/1
1 TABLET ORAL EVERY 8 HOURS PRN
COMMUNITY
Start: 2025-01-24

## 2025-02-04 RX ORDER — PREGABALIN 50 MG/1
CAPSULE ORAL
Qty: 30 CAPSULE | Refills: 0 | Status: SHIPPED | OUTPATIENT
Start: 2025-02-04

## 2025-02-04 NOTE — LETTER
February 4, 2025     Patient: Cathi Krishnan   YOB: 1980   Date of Visit: 2/4/2025       To Whom It May Concern:    It is my medical opinion that Cathi Krishnan may return to work tomorrow 2/05/2025.           Sincerely,        Harrison Berry PA-C    CC: No Recipients

## 2025-02-04 NOTE — PROGRESS NOTES
"    Chief complaint:   Chief Complaint   Patient presents with    Back Pain     New patient ref by Leon Kehrer NP for low back pain since 2013       Subjective     HPI: Cathi presents as referral request from [facility] pain management for evaluation of chronic low back pain with radiating pain into the right lower extremity.  She has been in pain management since 2014.  She does lumbar RFA every 6 months.  Back pain is fairly constant but increases with activity.  Pain radiates into the right buttock down the right lateral thigh into the calf and lateral aspect of the right foot.  Leg pain seems worse at night when she lays down.  She denies any focal weakness as well as paresthesias.  She is utilizing Celebrex, Cymbalta and hydrocodone without symptom relief.  Other than pain management she has not had any recent conservative care.    Past medical history is significant for GERD, gastric sleeve, anxiety depression and OAB. 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  She is  and her  is with her today.  She does not utilize drugs, alcohol or tobacco products.    Review of Systems     Past Medical History:   Diagnosis Date    Ankle swelling     Anxiety     Arthritis     Back pain     Callus 2014    Constipation     Depression     Excessive thirst     GERD (gastroesophageal reflux disease)     Heartburn     History of attempted suicide     age 13 years old     Ingrown toenail 2020    Leg cramps     with walking    Loss of bladder control leaking urine    Pain     Shoulder, neck, knee,back      Rash     in skin folds     Rheumatoid arthritis     \"starting\" was mild case per Dr. Pelaez    Varicose veins of both lower extremities     Wears glasses      Past Surgical History:   Procedure Laterality Date    CHOLECYSTECTOMY  1999    lap    ENDOSCOPY      ENDOSCOPY N/A 07/31/2018    Procedure: ESOPHAGOGASTRODUODENOSCOPY WITH ANESTHESIA;  Surgeon: " "Julio C Rodriguez MD;  Location:  [facility];  Service: General    ESSURE TUBAL LIGATION      2016 & 2017     GASTRIC SLEEVE LAPAROSCOPIC N/A 11/28/2018    Procedure: GASTRIC SLEEVE LAPAROSCOPIC;  Surgeon: Julio C Rodriguez MD;  Location:  [facility] OR;  Service: Bariatric    HERNIA REPAIR Left     inguinal; without mesh     TEETH EXTRACTION      TOENAIL EXCISION  4/14/2023    TONSILLECTOMY      TOTAL KNEE ARTHROPLASTY Right 12/28/2022     Family History   Problem Relation Age of Onset    Hypertension Father     Cancer Father     COPD Father     Breast cancer Mother     Ovarian cancer Mother     Hypertension Mother     Diabetes Mother         Pass away in 2007    Stroke Mother     Coronary artery disease Mother     Deep vein thrombosis Mother     Obesity Mother     Heart disease Mother     Sleep apnea Mother     Arthritis Mother     Lung cancer Paternal Grandfather     Hypertension Brother     Obesity Brother     Diabetes Brother         Pass away 2014    Heart attack Brother     Heart disease Brother     Arthritis Brother     Heart attack Maternal Grandmother     Obesity Maternal Grandmother     Obesity Brother     Asthma Brother     Learning disabilities Brother      Social History     Tobacco Use    Smoking status: Former     Current packs/day: 0.00     Average packs/day: 1 pack/day for 10.0 years (10.0 ttl pk-yrs)     Types: Cigarettes     Start date: 1/1/2000     Quit date: 1/1/2010     Years since quitting: 15.1     Passive exposure: Past    Smokeless tobacco: Never   Vaping Use    Vaping status: Never Used   Substance Use Topics    Alcohol use: No    Drug use: No     (Not in a hospital admission)    Allergies:  Asa [aspirin], Cortisone, Penicillins, Prednisone, Zantac [ranitidine hcl], Ibuprofen, and Nsaids    Objective      Vital Signs  Ht 157.5 cm (62\")   Wt 107 kg (235 lb)   BMI 42.98 kg/m²     Physical Exam  Constitutional:       Appearance: Normal appearance. She is well-developed.   HENT:      Head: " Normocephalic.   Eyes:      General: Lids are normal.      Conjunctiva/sclera: Conjunctivae normal.      Pupils: Pupils are equal, round, and reactive to light.   Cardiovascular:      Rate and Rhythm: Normal rate.   Pulmonary:      Effort: Pulmonary effort is normal.      Breath sounds: Normal breath sounds.   Musculoskeletal:         General: Tenderness (Facet tenderness L4-S1 and TTP bilateral SI joints) present. Normal range of motion.      Cervical back: Normal range of motion.   Skin:     General: Skin is warm and dry.   Neurological:      Mental Status: She is oriented to person, place, and time.      GCS: GCS eye subscore is 4. GCS verbal subscore is 5. GCS motor subscore is 6.      Cranial Nerves: No cranial nerve deficit.      Sensory: No sensory deficit.      Motor: Motor strength is normal.No weakness.      Gait: Gait normal.      Deep Tendon Reflexes: Reflexes normal.      Reflex Scores:       Tricep reflexes are 2+ on the right side and 2+ on the left side.       Bicep reflexes are 2+ on the right side and 2+ on the left side.       Brachioradialis reflexes are 2+ on the right side and 2+ on the left side.       Patellar reflexes are 2+ on the right side and 2+ on the left side.       Achilles reflexes are 2+ on the right side and 2+ on the left side.  Psychiatric:         Speech: Speech normal.         Behavior: Behavior normal.         Thought Content: Thought content normal.         Neurological Exam  Mental Status  Awake, alert and oriented to person, place and time. Oriented to person, place, and time. Speech is normal.    Cranial Nerves  CN III, IV, VI: Normal lids and orbits bilaterally. Pupils equal round and reactive to light bilaterally.    Motor   Strength is 5/5 throughout all four extremities.    Sensory  Sensation is intact to light touch, pinprick, vibration and proprioception in all four extremities.    Reflexes                                            Right                       Left  Brachioradialis                    2+                         2+  Biceps                                 2+                         2+  Triceps                                2+                         2+  Patellar                                2+                         2+  Achilles                                2+                         2+    Gait   Normal gait.       Imaging review: No recent imaging        Assessment/Plan: Cathi has chronic low back pain with radiating pain to the right lower extremity.  She has been in pain management for 10 years.  She gets lumbar RFA every 6 months.  Right leg pain does interfere with her sleep at night.  She is neurologically stable.  Spinal cord stimulator has been mentioned by pain management.    I would like to get lumbar radiographs today to include flexion-extension to evaluate her alignment as well as for any instability.    For first line conservative care of lumbar pain, I would like to send Ms. Krishnan for a dedicated course of physician directed physical therapy consisting of 2-3 times a week for 4-6 weeks.    Return for reassessment with me after 6-8 weeks after physical therapy.     We discussed trial of Lyrica 50 mg at bedtime as needed for nerve pain.  She is agreeable and prescription was sent.    Should Ms. Krishnan not have any improvement from physical therapy, I think it would be prudent to send the patient for an MRI of the lumbar spine to see if there is anything from a surgical standpoint that needs to be addressed.     I advised the patient to call and return sooner for new or worsening complaints of weakness, paresthesias, gait disturbances, or any additional concerns.  Treatment options discussed in detail with Cathi and the patient voiced understanding.  Ms. Krishnan agrees with this plan of care.     Patient is a nonsmoker    The patient's Body mass index is 42.98 kg/m².. BMI is above normal parameters. Recommendations include:  educational material    Diagnoses and all orders for this visit:    1. Chronic bilateral low back pain with right-sided sciatica (Primary)  -     XR Spine Lumbar AP & Lateral With Flex & Ext; Future  -     Ambulatory Referral to Physical Therapy for Evaluation & Treatment  -     pregabalin (Lyrica) 50 MG capsule; Take at night for nerve pain  Dispense: 30 capsule; Refill: 0    2. Nonsmoker    3. Class 3 severe obesity due to excess calories without serious comorbidity with body mass index (BMI) of 40.0 to 44.9 in adult          I discussed the patients findings and my recommendations with patient    Harrison Berry PA-C  02/04/25  11:00 CST

## 2025-02-04 NOTE — PATIENT INSTRUCTIONS
Continue medications as current.  Lyrica has been sent to your pharmacy to utilize at bedtime as needed for nerve pain  You will need to set up physical therapy intake yourself.  Prescription for physical therapy was provided at checkout  Follow-up in the office in 8 to 10 weeks.  Call for sooner appointment if you have any issues or concerns before next visit.

## 2025-04-11 ENCOUNTER — TELEPHONE (OUTPATIENT)
Dept: NEUROSURGERY | Facility: CLINIC | Age: 45
End: 2025-04-11
Payer: COMMERCIAL

## 2025-04-11 NOTE — TELEPHONE ENCOUNTER
called in regards to cancelled appt. pt stated she does not want to reschedule she is going to continue with injections

## 2025-05-09 ENCOUNTER — TELEPHONE (OUTPATIENT)
Dept: PSYCHIATRY | Age: 45
End: 2025-05-09

## 2025-05-09 NOTE — TELEPHONE ENCOUNTER
Called and confirmed appt with pt for 5/12 @ 8 am     Electronically signed by Maru Gutiérrez on 5/9/2025 at 10:07 AM

## 2025-05-12 ENCOUNTER — OFFICE VISIT (OUTPATIENT)
Dept: PSYCHIATRY | Age: 45
End: 2025-05-12
Payer: COMMERCIAL

## 2025-05-12 VITALS
HEIGHT: 61 IN | DIASTOLIC BLOOD PRESSURE: 78 MMHG | BODY MASS INDEX: 42.86 KG/M2 | OXYGEN SATURATION: 97 % | TEMPERATURE: 97.8 F | SYSTOLIC BLOOD PRESSURE: 128 MMHG | WEIGHT: 227 LBS | HEART RATE: 62 BPM

## 2025-05-12 DIAGNOSIS — G47.00 INSOMNIA, UNSPECIFIED TYPE: ICD-10-CM

## 2025-05-12 DIAGNOSIS — F33.0 MILD EPISODE OF RECURRENT MAJOR DEPRESSIVE DISORDER: Primary | ICD-10-CM

## 2025-05-12 DIAGNOSIS — F43.20 GRIEF REACTION: ICD-10-CM

## 2025-05-12 DIAGNOSIS — F41.1 GAD (GENERALIZED ANXIETY DISORDER): ICD-10-CM

## 2025-05-12 PROCEDURE — 99215 OFFICE O/P EST HI 40 MIN: CPT | Performed by: PSYCHIATRY & NEUROLOGY

## 2025-05-12 RX ORDER — DULOXETIN HYDROCHLORIDE 60 MG/1
60 CAPSULE, DELAYED RELEASE ORAL EVERY MORNING
Qty: 90 CAPSULE | Refills: 3 | Status: SHIPPED | OUTPATIENT
Start: 2025-05-12 | End: 2025-08-10

## 2025-05-12 NOTE — PROGRESS NOTES
5/12/2025 8:25 AM   Progress Note          Carolin Salter 1980      Chief Complaint   Patient presents with    Insomnia         Subjective:    44 y.o. white female diagnosed with MDD, RAHEEL - not on CPAP, presents today for follow-up. On Cymbalta and Buspar PRN. No side effects.     Pt is calm and cooperative. Lost her dad to cancer recently. Coping well. Tired. Planning to have gastric bypass surgery. Working at a factory. Lives with  and kids, 15 and 18 daughters. Started walking - lost 20 lb. Not interested in therapy.         Objective:      /78   Pulse 62   Temp 97.8 °F (36.6 °C)   Ht 1.549 m (5' 1\")   Wt 103 kg (227 lb)   SpO2 97%   BMI 42.89 kg/m²       Review of Systems - 14 point review:  Negative except for    Constitutional: (fevers, chills, night sweats, wt loss/gain, change in appetite, fatigue, somnolence)    HEENT: (ear pain or discharge, hearing loss, ear ringing, sinus pressure, nosebleed, nasal discharge, sore throat, oral sores, tooth pain, bleeding gums, hoarse voice, neck pain)      Cardiovascular: (HTN, chest pain, elevated cholesterol/lipids, palpitations, leg swelling, leg pain with walking)    Respiratory: (cough, wheezing, snoring, SOB with activity (dyspnea), SOB while lying flat (orthopnea), awakening with severe SOB (paroxysmal nocturnal dyspnea))    Gastrointestinal: (NVD, constipation, abdominal pain, bright red stools, black tarry stools, stool incontinence)     Genitourinary:  (pelvic pain, burning or frequency of urination, urinary urgency, blood in urine incomplete bladder emptying, urinary incontinence, STD; MEN: testicular pain or swelling, erectile dysfunction; WOMEN: LMP, heavy menstrual bleeding (menorrhagia), irregular periods, postmenopausal bleeding, menstrual pain (dymenorrhea, vaginal discharge)    Musculoskeletal: (bone pain/fracture, joint pain or swelling, musle pain)    Integumentary: (rashes, acne, non-healing sores, itching, breast lumps,

## 2025-05-23 ENCOUNTER — TELEPHONE (OUTPATIENT)
Age: 45
End: 2025-05-23

## 2025-05-23 NOTE — TELEPHONE ENCOUNTER
Called patient back she is having some pain in her left knee that was replaced we will make her an appointment for next week.   
Patient would like to make a follow up for her left knee total replacement  
No edema

## 2025-05-29 ENCOUNTER — OFFICE VISIT (OUTPATIENT)
Age: 45
End: 2025-05-29
Payer: COMMERCIAL

## 2025-05-29 VITALS — HEIGHT: 61 IN | WEIGHT: 227 LBS | BODY MASS INDEX: 42.86 KG/M2

## 2025-05-29 DIAGNOSIS — M76.32 IT BAND SYNDROME, LEFT: ICD-10-CM

## 2025-05-29 DIAGNOSIS — Z96.652 PRESENCE OF LEFT ARTIFICIAL KNEE JOINT: Primary | ICD-10-CM

## 2025-05-29 PROCEDURE — 99213 OFFICE O/P EST LOW 20 MIN: CPT | Performed by: ORTHOPAEDIC SURGERY

## 2025-05-29 RX ORDER — ALBUTEROL SULFATE 90 UG/1
2 INHALANT RESPIRATORY (INHALATION) EVERY 4 HOURS PRN
COMMUNITY
Start: 2024-12-31

## 2025-05-29 RX ORDER — ATENOLOL 25 MG/1
25 TABLET ORAL DAILY
COMMUNITY
Start: 2024-11-17

## 2025-05-29 RX ORDER — METOCLOPRAMIDE 10 MG/1
10 TABLET ORAL 2 TIMES DAILY
COMMUNITY
Start: 2024-12-12

## 2025-05-29 RX ORDER — PREDNISONE 10 MG/1
TABLET ORAL
Qty: 30 TABLET | Refills: 0 | Status: SHIPPED | OUTPATIENT
Start: 2025-05-29

## 2025-05-29 NOTE — PROGRESS NOTES
[facility]  [facility]  [address]  [facility] [id number] [phone]     Patient: Carolin Salter   YOB: 1980   Date: 5/29/2025     Chief Complaint   Patient presents with    Follow-up     Left TKR 12/2023        History of Present Illness  This is a 44 y.o. female presents today 1 month history of left knee pain she describes to be a laterally on the knee..    History of Present Illness  The patient is a 44-year-old female who presents for evaluation of left knee pain.    She reports experiencing discomfort in her left knee, which radiates to the posterior aspect. This pain has been present since 04/2025 and is severe enough to disrupt her sleep. She recalls an incident where she stepped on the floor and experienced significant pain upon attempting to rise, leading her to question if she had caused any damage to her knee. She also mentions that the current pain is reminiscent of the discomfort she experienced during a previous meniscal tear. Despite the pain, she is able to ambulate. She reports no febrile episodes. She has been managing her pain with Tylenol and a topical analgesic cream from [facility]. She has previously received a cortisone injection, which resulted in a breakout, but she is uncertain if this was due to the medication.    She is scheduled to undergo gastric bypass surgery at [facility].    PAST SURGICAL HISTORY:  Bilateral knee replacements: 12/2023    SOCIAL HISTORY  Occupations: Works at a [employer]  Exercise: Walking       Past Medical History:   Diagnosis Date    Anxiety     Arthritis       Past Surgical History:   Procedure Laterality Date    CHOLECYSTECTOMY      GASTRIC BAND      INGUINAL HERNIA REPAIR Left     JOINT REPLACEMENT      KNEE CARTILAGE SURGERY Left 01/17/2019    LEFT KNEE ARTHROSCOPIC PARTIAL MEDIAL MENISCECTOMY performed by Eleazar Stark MD at [facility] OR    KNEE SURGERY

## 2025-08-06 ENCOUNTER — TELEPHONE (OUTPATIENT)
Dept: PSYCHIATRY | Age: 45
End: 2025-08-06

## 2025-08-22 ENCOUNTER — TELEPHONE (OUTPATIENT)
Age: 45
End: 2025-08-22

## (undated) DEVICE — FRCP BX RADJAW4 NDL 2.8 240 STD OG

## (undated) DEVICE — THE CHANNEL CLEANING BRUSH IS A NYLON FLEXI BRUSH ATTACHED TO A FLEXIBLE PLASTIC SHEATH DESIGNED TO SAFELY REMOVE DEBRIS FROM FLEXIBLE ENDOSCOPES.

## (undated) DEVICE — STERILE LATEX POWDER FREE SURGICAL GLOVES WITH HYDROGEL COATING: Brand: PROTEXIS

## (undated) DEVICE — SURGICAL PROCEDURE PACK LOWER EXTREMITY LOURDES HOSP

## (undated) DEVICE — SENSR O2 OXIMAX FNGR A/ 18IN NONSTR

## (undated) DEVICE — SKIN AFFIX SURG ADHESIVE 72/CS 0.55ML: Brand: MEDLINE

## (undated) DEVICE — SUT VIC 0 SUTUPAK TIES 18IN J906G

## (undated) DEVICE — 2, DISPOSABLE SUCTION/IRRIGATOR WITHOUT DISPOSABLE TIP: Brand: STRYKEFLOW

## (undated) DEVICE — PK TURNOVER RM ADV

## (undated) DEVICE — ZIMMER® STERILE DISPOSABLE TOURNIQUET CUFF WITH PLC, DUAL PORT, SINGLE BLADDER, 34 IN. (86 CM)

## (undated) DEVICE — FLTR PLUMEPORT LAP W/CONN STRL

## (undated) DEVICE — HARMONIC ACE +7 LAPAROSCOPIC SHEARS ADVANCED HEMOSTASIS 5MM DIAMETER 45CM SHAFT LENGTH  FOR USE WITH GRAY HAND PIECE ONLY: Brand: HARMONIC ACE

## (undated) DEVICE — SYR LL TP 10ML STRL

## (undated) DEVICE — SUTURE MCRYL SZ 3 0 L18IN ABSRB UD PS 2 3 8 CIR REV CUT NDL MCP497G

## (undated) DEVICE — STERILE POLYISOPRENE POWDER-FREE SURGICAL GLOVES: Brand: PROTEXIS

## (undated) DEVICE — ENDOPATH XCEL BLADELESS TROCARS WITH STABILITY SLEEVES: Brand: ENDOPATH XCEL

## (undated) DEVICE — ENDOPATH XCEL WITH OPTIVIEW TECHNOLOGY BLADELESS TROCARS WITH STABILITY SLEEVES: Brand: ENDOPATH XCEL OPTIVIEW

## (undated) DEVICE — TBG SMPL FLTR LINE NASL 02/C02 A/ BX/100

## (undated) DEVICE — CHLORAPREP 26ML ORANGE

## (undated) DEVICE — GOWN,NON-REINFORCED,SIRUS,SET IN SLV,XXL: Brand: MEDLINE

## (undated) DEVICE — BNDR ABD 4PANEL 12IN 74TO85IN

## (undated) DEVICE — CUFF,BP,DISP,1 TUBE,ADULT,HP: Brand: MEDLINE

## (undated) DEVICE — WAND ABLAT DIA3.5MM PRB ENERGY SUCT 50-S SERFAS

## (undated) DEVICE — TOWEL,OR,DSP,ST,BLUE,DLX,10/PK,8PK/CS: Brand: MEDLINE

## (undated) DEVICE — GLV SURG BIOGEL LTX PF 8

## (undated) DEVICE — MONOPOLAR METZENBAUM SCISSOR, MINI BLADE TIP, DISPOSABLE: Brand: MONOPOLAR METZENBAUM SCISSOR, MINI BLADE TIP, DISPOSABLE

## (undated) DEVICE — NDL HYPO PRECISIONGLIDE REG 22G 1 1/2

## (undated) DEVICE — 4.5 MM FULL RADIUS STRAIGHT                                    BLADES, POWER/EP-1, YELLOW, PACKAGED                                    6 PER BOX, STERILE: Brand: DYONICS

## (undated) DEVICE — APPL CHLORAPREP W/TINT 26ML ORNG

## (undated) DEVICE — TUBING, SUCTION, 1/4" X 20', STRAIGHT: Brand: MEDLINE INDUSTRIES, INC.

## (undated) DEVICE — ENDOGATOR AUXILIARY WATER JET CONNECTOR: Brand: ENDOGATOR

## (undated) DEVICE — PAD GRND REM POLYHESIVE A/ DISP

## (undated) DEVICE — GLV SURG TRIUMPH ORTHO W/ALOE PF LTX 7.0

## (undated) DEVICE — PAD LAP CHOLE: Brand: MEDLINE INDUSTRIES, INC.

## (undated) DEVICE — TUBING PMP IRRIG GOFLO

## (undated) DEVICE — GLV SURG SENSICARE W/ALOE PF LF 7 STRL

## (undated) DEVICE — BANDAGE,GAUZE,BULKEE II,4.5"X4.1YD,STRL: Brand: MEDLINE

## (undated) DEVICE — VISUALIZATION SYSTEM: Brand: CLEARIFY

## (undated) DEVICE — ECHELON FLEX POWERED PLUS LONG ARTICULATING ENDOSCOPIC LINEAR CUTTER, 60MM: Brand: ECHELON FLEX

## (undated) DEVICE — GLV SURG TRIUMPH GREEN W/ALOE PF LTX 8.5 STRL

## (undated) DEVICE — ANTIBACTERIAL UNDYED BRAIDED (POLYGLACTIN 910), SYNTHETIC ABSORBABLE SUTURE: Brand: COATED VICRYL

## (undated) DEVICE — ENDOPATH XCEL WITH OPTIVIEW TECHNOLOGY UNIVERSAL TROCAR STABILITY SLEEVES: Brand: ENDOPATH XCEL OPTIVIEW

## (undated) DEVICE — Device: Brand: DEFENDO AIR/WATER/SUCTION AND BIOPSY VALVE

## (undated) DEVICE — 3M™ STERI-STRIP™ REINFORCED ADHESIVE SKIN CLOSURES, R1547, 1/2 IN X 4 IN (12 MM X 100 MM), 6 STRIPS/ENVELOPE: Brand: 3M™ STERI-STRIP™

## (undated) DEVICE — SUT MNCRYL 4/0 RB1 27IN UD MCP214H

## (undated) DEVICE — SYS CLOSE PORTII CARTR/THOMASN XL

## (undated) DEVICE — VISIGI 3D®  CALIBRATION SYSTEM  SIZE 40FR STD W/ BULB: Brand: BOEHRINGER® VISIGI 3D™ SLEEVE GASTRECTOMY CALIBRATION SYSTEM, SIZE 40FR W/BULB

## (undated) DEVICE — MAT PREVALON MOBL TRANSFR AIR W/PAD 39X81IN

## (undated) DEVICE — BANDAGE COMPR W3INXL15FT BGE E SGL LAYERED CLP CLSR

## (undated) DEVICE — CONMED SCOPE SAVER BITE BLOCK, 20X27 MM: Brand: SCOPE SAVER

## (undated) DEVICE — CONTROL SYRINGE LUER-LOCK TIP: Brand: MONOJECT

## (undated) DEVICE — Z INACTIVE USE 2660664 SOLUTION IRRIG 3000ML 0.9% SOD CHL USP UROMATIC PLAS CONT